# Patient Record
Sex: MALE | Race: BLACK OR AFRICAN AMERICAN | Employment: OTHER | URBAN - METROPOLITAN AREA
[De-identification: names, ages, dates, MRNs, and addresses within clinical notes are randomized per-mention and may not be internally consistent; named-entity substitution may affect disease eponyms.]

---

## 2017-02-07 ENCOUNTER — HOSPITAL ENCOUNTER (OUTPATIENT)
Dept: RADIATION ONCOLOGY | Age: 62
Discharge: HOME | End: 2017-02-07
Payer: OTHER GOVERNMENT

## 2017-02-07 DIAGNOSIS — C61 PROSTATE CANCER (HCC): ICD-10-CM

## 2017-02-07 LAB — PSA SERPL-MCNC: 0.3 NG/ML

## 2017-02-07 PROCEDURE — 84403 ASSAY OF TOTAL TESTOSTERONE: CPT | Performed by: RADIOLOGY

## 2017-02-07 PROCEDURE — 84153 ASSAY OF PSA TOTAL: CPT | Performed by: RADIOLOGY

## 2017-02-07 PROCEDURE — 36415 COLL VENOUS BLD VENIPUNCTURE: CPT | Performed by: RADIOLOGY

## 2017-02-08 LAB
COMMENT, TESC2: ABNORMAL
TESTOST SERPL-MCNC: 247 NG/DL (ref 348–1197)

## 2017-02-14 ENCOUNTER — APPOINTMENT (OUTPATIENT)
Dept: RADIATION ONCOLOGY | Age: 62
End: 2017-02-14
Payer: OTHER GOVERNMENT

## 2017-02-15 ENCOUNTER — HOSPITAL ENCOUNTER (OUTPATIENT)
Dept: RADIATION ONCOLOGY | Age: 62
Discharge: HOME | End: 2017-02-15
Payer: OTHER GOVERNMENT

## 2017-02-15 VITALS
SYSTOLIC BLOOD PRESSURE: 141 MMHG | BODY MASS INDEX: 34.11 KG/M2 | WEIGHT: 251.5 LBS | DIASTOLIC BLOOD PRESSURE: 87 MMHG | TEMPERATURE: 98.1 F | OXYGEN SATURATION: 99 % | HEART RATE: 68 BPM

## 2017-02-15 DIAGNOSIS — C61 PROSTATE CANCER (HCC): Primary | ICD-10-CM

## 2017-02-15 PROCEDURE — 99211 OFF/OP EST MAY X REQ PHY/QHP: CPT

## 2017-02-15 NOTE — NURSE NAVIGATOR
Pt here for 3 month f/u for Prostate cancer. AUA/Epic symptom  completed. AUA score 17. S/p Brachytherapy 8-15-16. S/p Space OAR. Eligard 4-7-16. Finished Casodex in October. Labs 2-7-17:  PSA 0.3. Testosterone 247. Pt c/o pain level 8 to left groin starting Monday night. States he has palpable area he believes is a lymph node. He rode bike first time in a while on Monday for about 2 blocks.     Rosanne Cueva, RN

## 2017-02-15 NOTE — PROGRESS NOTES
Patient: Michelle Wright MRN: [id number]  SSN: [id number]    YOB: 1955  Age: 62 y.o. Sex: male      DIAGNOSIS:  Adenocarcinoma of the prostate, T2a, Roxanna score 4+4 = 8, PSA 4.49    TREATMENT SITE AND DOSE DELIVERED:    1)  He received HDR brachytherapy under the direction of Dr. Williams Lu in [place] on 08/15/16. He also had              SpaceOAR hydrogel placed on 08/17/16. Pelvis has received 37.8 Gy of 45 Gy in 25/25 fractions that he completed      10/10/16. HISTORY OF PRESENT ILLNESS: Michelle Wright is a 62year old male seen by Dr. Edmundo Cleveland on 03/16/16 at the request of Dr. Rogelio Tracy for further discussion of management options for this high risk prostate cancer. He was found to have an elevated PSA of 4.49 by his primary care physician. He was then referred to Dr. Rogelio Tracy for further evaluation. Dr. Rogelio Tracy palpated a 1 cm nodule at the right apex. He has a family history of prostate cancer in his father. Prostate biopsy on 01/04/16 demonstrated a 36.17 g prostate for a PSA density of 0.12. Pathology showed Oakwood score 4+4 = 8 disease in 25% of the tissue from the left lateral base. 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 High-grade PIN was also seen in tissue from the right lateral base. No other biopsy cores contained cancer. MRI of the pelvis on 02/02/16 showed significant heterogeneity throughout the peripheral zone of the prostate. The most convincing focus of T2 signal loss was seen at the left posterior base where a nodule measuring 9 mm was seen. Abnormal signal change was seen in the bilateral seminal vesicles which was felt likely to represent hemorrhage from biopsy. No extracapsular disease was identified. No pelvic lymphadenopathy or other metastatic disease was seen in the pelvis or visualized abdomen. Bone scan on 02/02/16 showed no evidence of bony metastatic disease. He had a thorough discussion with Dr. Rogelio Tracy regarding various treatment options.  He was interested in further discussion of radiation therapy.       INTERVAL HISTORY:  Roney Marcus is a 62 y.o. male now four months following his radiation treatment for his high risk prostate cancer. He has an AUA score of 17/35 but reports that his urinary function is essentially back to baseline. He continues with nccturia x3. He denies frequency, urgency, leakage, dysuria or hematuria. He has good force of stream. He is no longer on Flomax and doesn't notice a difference with his urinary function. He has good bowel function with occasional constipation. Energy has improved and he continues to exercise daily. He has a good appetite. Denies cough or shortness of breath. Feels well. He retired 11/1/16. He tells me that he went on a very short bike ride around the block over the weekend, and that evening he began having left groin pain. He denies heavy lifting or injury to the area. Denies fever. GENERAL: The patient is well-developed, ambulatory, alert and in no acute distress. HEENT: Head is normocephalic, atraumatic. NECK: Neck is supple with no masses. CARDIOVASCULAR: Heart has regular rate and rhythm. RESPIRATORY: Lungs are clear to auscultation. Left groin with scant swelling and very painful to touch. No redness or folliculitis noted. ? Hernia. LABS:   02/07/17: PSA 0.3 with testosterone of 247  11/11/16: PSA <0.1 with testosterone of <3    ASSESSMENT:  Roney Marcus is now four months following completion of IMRT. He received a single dose of Eligard on 4/7/16 and completed the casodex on 10/10/16. He continues with occasional, mild hot flashes. He  He is recovering as anticipated from ADT and radiation therapy. His PSA has taken a little jump to 0.3 from <0.1, most likely in response to his rising testosterone which is now at 247. He is no longer on Flomax and hasn't noticed a change in his urinary function. In regards to his ED, he was given viagra from his urologist with the [place] and has had little to no response.  He has placed a call to his urologist to discuss Cialis. 2) He is having new onset left groin pain since the weekend. The pain is not in the radiation field from his previous radiation for his prostate cancer. We discussed concern of a hernia and have asked him to follow up with his primary care physician today. He may need a referral to a surgeon. PLAN:  1) labs reviewed with Mr. Lester Samuel. 2) refer to primary care in regards to new onset pain of left groin. 2) discussed future management of his high risk prostate cancer. Will plan to see him every 3 months with labs x2 years then every 6 months generally. Will see me in 3 months. If PSA stable or better, will begin alternating every 3 month follow ups with Dr. Edin Hinojosa. Mr. Lester Samuel is in agreement with this plan. Mary Worley NP   February 15, 2017      Portions of this note were copied from prior encounters and reviewed for accuracy, currency, and represent documentation and tasks completed during this encounter. I verify and attest these portions to be unchanged from prior visits. Portions of this note were copied from prior encounters and reviewed for accuracy, currency, and represent documentation and tasks completed during this encounter. I verify and attest these portions to be unchanged from prior visits.

## 2017-05-11 ENCOUNTER — ANESTHESIA EVENT (OUTPATIENT)
Dept: SURGERY | Age: 62
End: 2017-05-11
Payer: OTHER GOVERNMENT

## 2017-05-12 ENCOUNTER — HOSPITAL ENCOUNTER (OUTPATIENT)
Age: 62
Setting detail: OUTPATIENT SURGERY
Discharge: HOME | End: 2017-05-12
Attending: ORTHOPAEDIC SURGERY | Admitting: ORTHOPAEDIC SURGERY
Payer: OTHER GOVERNMENT

## 2017-05-12 ENCOUNTER — ANESTHESIA (OUTPATIENT)
Dept: SURGERY | Age: 62
End: 2017-05-12
Payer: OTHER GOVERNMENT

## 2017-05-12 VITALS
RESPIRATION RATE: 16 BRPM | BODY MASS INDEX: 33.43 KG/M2 | SYSTOLIC BLOOD PRESSURE: 135 MMHG | WEIGHT: 246.5 LBS | OXYGEN SATURATION: 92 % | TEMPERATURE: 97.5 F | DIASTOLIC BLOOD PRESSURE: 84 MMHG | HEART RATE: 53 BPM

## 2017-05-12 PROCEDURE — 74011250636 HC RX REV CODE- 250/636

## 2017-05-12 PROCEDURE — 77030006668 HC BLD SHV MENSCS STRY -B: Performed by: ORTHOPAEDIC SURGERY

## 2017-05-12 PROCEDURE — 74011250636 HC RX REV CODE- 250/636: Performed by: ANESTHESIOLOGY

## 2017-05-12 PROCEDURE — 76060000032 HC ANESTHESIA 0.5 TO 1 HR: Performed by: ORTHOPAEDIC SURGERY

## 2017-05-12 PROCEDURE — 77030018836 HC SOL IRR NACL ICUM -A: Performed by: ORTHOPAEDIC SURGERY

## 2017-05-12 PROCEDURE — 74011250637 HC RX REV CODE- 250/637: Performed by: ANESTHESIOLOGY

## 2017-05-12 PROCEDURE — 36415 COLL VENOUS BLD VENIPUNCTURE: CPT | Performed by: ORTHOPAEDIC SURGERY

## 2017-05-12 PROCEDURE — 74011250636 HC RX REV CODE- 250/636: Performed by: ORTHOPAEDIC SURGERY

## 2017-05-12 PROCEDURE — 77030020143 HC AIRWY LARYN INTUB CGAS -A: Performed by: ANESTHESIOLOGY

## 2017-05-12 PROCEDURE — 74011000250 HC RX REV CODE- 250

## 2017-05-12 PROCEDURE — 76210000063 HC OR PH I REC FIRST 0.5 HR: Performed by: ORTHOPAEDIC SURGERY

## 2017-05-12 PROCEDURE — 77030020753 HC CUF TRNQT 1BLA STRY -B: Performed by: ORTHOPAEDIC SURGERY

## 2017-05-12 PROCEDURE — 76210000020 HC REC RM PH II FIRST 0.5 HR: Performed by: ORTHOPAEDIC SURGERY

## 2017-05-12 PROCEDURE — 76010000160 HC OR TIME 0.5 TO 1 HR INTENSV-TIER 1: Performed by: ORTHOPAEDIC SURGERY

## 2017-05-12 RX ORDER — SODIUM CHLORIDE 0.9 % (FLUSH) 0.9 %
5-10 SYRINGE (ML) INJECTION AS NEEDED
Status: DISCONTINUED | OUTPATIENT
Start: 2017-05-12 | End: 2017-05-12 | Stop reason: HOSPADM

## 2017-05-12 RX ORDER — ACETAMINOPHEN 500 MG
1000 TABLET ORAL ONCE
Status: COMPLETED | OUTPATIENT
Start: 2017-05-12 | End: 2017-05-12

## 2017-05-12 RX ORDER — HYDROMORPHONE HYDROCHLORIDE 2 MG/ML
0.5 INJECTION, SOLUTION INTRAMUSCULAR; INTRAVENOUS; SUBCUTANEOUS
Status: DISCONTINUED | OUTPATIENT
Start: 2017-05-12 | End: 2017-05-12 | Stop reason: HOSPADM

## 2017-05-12 RX ORDER — LIDOCAINE HYDROCHLORIDE 20 MG/ML
INJECTION, SOLUTION EPIDURAL; INFILTRATION; INTRACAUDAL; PERINEURAL AS NEEDED
Status: DISCONTINUED | OUTPATIENT
Start: 2017-05-12 | End: 2017-05-12 | Stop reason: HOSPADM

## 2017-05-12 RX ORDER — FAMOTIDINE 20 MG/1
20 TABLET, FILM COATED ORAL ONCE
Status: COMPLETED | OUTPATIENT
Start: 2017-05-12 | End: 2017-05-12

## 2017-05-12 RX ORDER — SODIUM CHLORIDE, SODIUM LACTATE, POTASSIUM CHLORIDE, CALCIUM CHLORIDE 600; 310; 30; 20 MG/100ML; MG/100ML; MG/100ML; MG/100ML
100 INJECTION, SOLUTION INTRAVENOUS CONTINUOUS
Status: DISCONTINUED | OUTPATIENT
Start: 2017-05-12 | End: 2017-05-12 | Stop reason: HOSPADM

## 2017-05-12 RX ORDER — DEXAMETHASONE SODIUM PHOSPHATE 4 MG/ML
INJECTION, SOLUTION INTRA-ARTICULAR; INTRALESIONAL; INTRAMUSCULAR; INTRAVENOUS; SOFT TISSUE AS NEEDED
Status: DISCONTINUED | OUTPATIENT
Start: 2017-05-12 | End: 2017-05-12 | Stop reason: HOSPADM

## 2017-05-12 RX ORDER — OXYCODONE HYDROCHLORIDE 5 MG/1
10 TABLET ORAL
Status: COMPLETED | OUTPATIENT
Start: 2017-05-12 | End: 2017-05-12

## 2017-05-12 RX ORDER — PROPOFOL 10 MG/ML
INJECTION, EMULSION INTRAVENOUS AS NEEDED
Status: DISCONTINUED | OUTPATIENT
Start: 2017-05-12 | End: 2017-05-12 | Stop reason: HOSPADM

## 2017-05-12 RX ORDER — FENTANYL CITRATE 50 UG/ML
INJECTION, SOLUTION INTRAMUSCULAR; INTRAVENOUS AS NEEDED
Status: DISCONTINUED | OUTPATIENT
Start: 2017-05-12 | End: 2017-05-12 | Stop reason: HOSPADM

## 2017-05-12 RX ORDER — ONDANSETRON 2 MG/ML
INJECTION INTRAMUSCULAR; INTRAVENOUS AS NEEDED
Status: DISCONTINUED | OUTPATIENT
Start: 2017-05-12 | End: 2017-05-12 | Stop reason: HOSPADM

## 2017-05-12 RX ORDER — MIDAZOLAM HYDROCHLORIDE 1 MG/ML
2 INJECTION, SOLUTION INTRAMUSCULAR; INTRAVENOUS
Status: COMPLETED | OUTPATIENT
Start: 2017-05-12 | End: 2017-05-12

## 2017-05-12 RX ORDER — CEFAZOLIN SODIUM IN 0.9 % NACL 2 G/50 ML
2 INTRAVENOUS SOLUTION, PIGGYBACK (ML) INTRAVENOUS ONCE
Status: COMPLETED | OUTPATIENT
Start: 2017-05-12 | End: 2017-05-12

## 2017-05-12 RX ORDER — LIDOCAINE HYDROCHLORIDE 10 MG/ML
0.3 INJECTION INFILTRATION; PERINEURAL ONCE
Status: DISCONTINUED | OUTPATIENT
Start: 2017-05-12 | End: 2017-05-12 | Stop reason: HOSPADM

## 2017-05-12 RX ORDER — SODIUM CHLORIDE 0.9 % (FLUSH) 0.9 %
5-10 SYRINGE (ML) INJECTION EVERY 8 HOURS
Status: DISCONTINUED | OUTPATIENT
Start: 2017-05-12 | End: 2017-05-12 | Stop reason: HOSPADM

## 2017-05-12 RX ADMIN — PROPOFOL 200 MG: 10 INJECTION, EMULSION INTRAVENOUS at 07:29

## 2017-05-12 RX ADMIN — CEFAZOLIN 2 G: 1 INJECTION, POWDER, FOR SOLUTION INTRAMUSCULAR; INTRAVENOUS; PARENTERAL at 07:32

## 2017-05-12 RX ADMIN — SODIUM CHLORIDE, SODIUM LACTATE, POTASSIUM CHLORIDE, AND CALCIUM CHLORIDE 100 ML/HR: 600; 310; 30; 20 INJECTION, SOLUTION INTRAVENOUS at 06:11

## 2017-05-12 RX ADMIN — LIDOCAINE HYDROCHLORIDE 100 MG: 20 INJECTION, SOLUTION EPIDURAL; INFILTRATION; INTRACAUDAL; PERINEURAL at 07:29

## 2017-05-12 RX ADMIN — DEXAMETHASONE SODIUM PHOSPHATE 8 MG: 4 INJECTION, SOLUTION INTRA-ARTICULAR; INTRALESIONAL; INTRAMUSCULAR; INTRAVENOUS; SOFT TISSUE at 07:44

## 2017-05-12 RX ADMIN — MIDAZOLAM HYDROCHLORIDE 2 MG: 1 INJECTION, SOLUTION INTRAMUSCULAR; INTRAVENOUS at 07:16

## 2017-05-12 RX ADMIN — FENTANYL CITRATE 25 MCG: 50 INJECTION, SOLUTION INTRAMUSCULAR; INTRAVENOUS at 07:54

## 2017-05-12 RX ADMIN — PROPOFOL 50 MG: 10 INJECTION, EMULSION INTRAVENOUS at 07:34

## 2017-05-12 RX ADMIN — OXYCODONE HYDROCHLORIDE 10 MG: 5 TABLET ORAL at 08:49

## 2017-05-12 RX ADMIN — ACETAMINOPHEN 1000 MG: 500 TABLET ORAL at 06:09

## 2017-05-12 RX ADMIN — FENTANYL CITRATE 25 MCG: 50 INJECTION, SOLUTION INTRAMUSCULAR; INTRAVENOUS at 07:44

## 2017-05-12 RX ADMIN — FAMOTIDINE 20 MG: 20 TABLET ORAL at 06:09

## 2017-05-12 RX ADMIN — FENTANYL CITRATE 50 MCG: 50 INJECTION, SOLUTION INTRAMUSCULAR; INTRAVENOUS at 07:33

## 2017-05-12 RX ADMIN — ONDANSETRON 4 MG: 2 INJECTION INTRAMUSCULAR; INTRAVENOUS at 07:58

## 2017-05-12 NOTE — ANESTHESIA POSTPROCEDURE EVALUATION
Post-Anesthesia Evaluation and Assessment    Patient: Melvi Anglin MRN: [id number]  SSN: [id number]    YOB: 1955  Age: 62 y.o. Sex: male       Cardiovascular Function/Vital Signs  Visit Vitals    /84    Pulse (!) 53    Temp 36.4 °C (97.5 °F)    Resp 16    Wt 111.8 kg (246 lb 8 oz)    SpO2 92%    BMI 33.43 kg/m2       Patient is status post total IV anesthesia anesthesia for Procedure(s):  KNEE ARTHROSCOPY BILATERAL Right Knee Medial Menisectomy / partial medial menisectomy /  abrasion chondroplasty . Nausea/Vomiting: None    Postoperative hydration reviewed and adequate. Pain:  Pain Scale 1: Numeric (0 - 10) (05/12/17 0849)  Pain Intensity 1: 6 (05/12/17 0849)   Managed    Neurological Status:   Neuro (WDL): Within Defined Limits (05/12/17 0627)   At baseline    Mental Status and Level of Consciousness: Alert and oriented     Pulmonary Status:   O2 Device:  (o2 off) (05/12/17 0844)   Adequate oxygenation and airway patent    Complications related to anesthesia: None    Post-anesthesia assessment completed.  No concerns    Signed By: Eliseo Linda MD     May 12, 2017

## 2017-05-12 NOTE — IP AVS SNAPSHOT
Current Discharge Medication List  
  
CONTINUE these medications which have NOT CHANGED Dose & Instructions Dispensing Information Comments Morning Noon Evening Bedtime  
 amLODIPine 10 mg tablet Commonly known as:  Ivonne Hooper Your last dose was: Your next dose is:    
   
   
 Dose:  10 mg Take 10 mg by mouth every morning. Refills:  0  
     
   
   
   
  
 simvastatin 20 mg tablet Commonly known as:  ZOCOR Your last dose was: Your next dose is:    
   
   
 Dose:  20 mg Take 20 mg by mouth every morning. Refills:  0  
     
   
   
   
  
 VITAMIN D3 1,000 unit Cap Generic drug:  cholecalciferol Your last dose was: Your next dose is:    
   
   
 Dose:  1000 Units Take 1,000 Units by mouth daily. Refills:  0

## 2017-05-12 NOTE — DISCHARGE INSTRUCTIONS
Post-Operative Instructions   For  Knee Arthroscopy  Phone:  [phone]    1. Unless otherwise instructed, you may place as much weight on your leg as you wish. 2. If you do not have an \"Iceman\" type cooling unit, for the first 48-72 hours following surgery, use ice on the knee every two hours (while awake) for 20-30 minutes at a time to help prevent swelling and lessen pain. If you have a cooling unit, follow the instructions given to you- continually as much as possible the first 48-72 hours, then 3-4 times a day for 4 weeks. Elevate leg. 3. Perform at least 30 to 50 leg-raising exercises twice a day. Begin exercise as soon as pain allows. Also perform gentle active motion of the knee as soon as pain allows. 4. On the third day after surgery, remove the dressing. Leave steri-strips on, they eventually will peel off.      5. Use any pain medication as instructed. You should take your pain medication as soon as you feel the anesthetic wearing off. Do not wait until you are in severe pain to begin taking your pain medication. 6. You may have some side effects from your pain medication. If you have nausea, try taking your medication with food. For itching, you may take over the counter Benadryl. 7. You may shower as soon as desired. The first three days after surgery, wrap the dressings in saran wrap to keep them dry when showering. 8. You may have been given a prescription for Zofran or Phenergan. This medication is used for nausea and vomiting. You do not need to get this prescription filled unless you have a problem. 9. If you have a problem, please call [place] [address] at [phone]    [facility] DIET  · Clear liquids until no nausea or vomiting; then light diet for the first day. · Advance to regular diet on second day, unless your doctor orders otherwise.    · If nausea and vomiting continues, call your doctor. PAIN  · Take pain medication as directed by your doctor. · Call your doctor if pain is NOT relieved by medication. CALL YOUR DOCTOR IF   · Excessive bleeding that does not stop after holding pressure over the area  · Temperature of 101 degrees F or above  · Excessive redness, swelling or bruising, and/ or green or yellow, smelly discharge from incision    AFTER ANESTHESIA   · For the first 24 hours: DO NOT Drive, Drink alcoholic beverages, or Make important decisions. · Be aware of dizziness following anesthesia and while taking pain medication. APPOINTMENT DATE/ TIME his office will call you with a 2 week follow up   [address] PHONE NUMBER  [phone]      DISCHARGE SUMMARY from Nurse    PATIENT INSTRUCTIONS:    After general anesthesia or intravenous sedation, for 24 hours or while taking prescription Narcotics:  · Limit your activities  · Do not drive and operate hazardous machinery  · Do not make important personal or business decisions  · Do  not drink alcoholic beverages  · If you have not urinated within 8 hours after discharge, please contact your surgeon on call. *  Please give a list of your current medications to your Primary Care Provider. *  Please update this list whenever your medications are discontinued, doses are      changed, or new medications (including over-the-counter products) are added. *  Please carry medication information at all times in case of emergency situations. These are general instructions for a healthy lifestyle:    No smoking/ No tobacco products/ Avoid exposure to second hand smoke    Surgeon General's Warning:  Quitting smoking now greatly reduces serious risk to your health.     Obesity, smoking, and sedentary lifestyle greatly increases your risk for illness    A healthy diet, regular physical exercise & weight monitoring are important for maintaining a healthy lifestyle    You may be retaining fluid if you have a history of heart failure or if you experience any of the following symptoms:  Weight gain of 3 pounds or more overnight or 5 pounds in a week, increased swelling in our hands or feet or shortness of breath while lying flat in bed. Please call your doctor as soon as you notice any of these symptoms; do not wait until your next office visit. Recognize signs and symptoms of STROKE:    F-face looks uneven    A-arms unable to move or move unevenly    S-speech slurred or non-existent    T-time-call 911 as soon as signs and symptoms begin-DO NOT go       Back to bed or wait to see if you get better-TIME IS BRAIN.

## 2017-05-12 NOTE — IP AVS SNAPSHOT
[address] 
[phone] Patient: Johnathan Miranda MRN: [id number] UBC:[id number] You are allergic to the following No active allergies Recent Documentation Weight BMI Smoking Status 111.8 kg 33.43 kg/m2 Former Smoker Emergency Contacts Name Discharge Info Relation Home Work Mobile [address] [facility] CAREGIVER [3] Spouse [3] [phone] About your hospitalization You were admitted on:  May 12, 2017 You last received care in the:  [facility] OP PACU You were discharged on:  May 12, 2017 Unit phone number:  [phone] Why you were hospitalized Your primary diagnosis was:  Not on File Providers Seen During Your Hospitalizations Provider Role Specialty Primary office phone Dennis Varner MD Attending Provider Orthopedic Surgery [phone] Your Primary Care Physician (PCP) Primary Care Physician Office Phone Office Fax [address] [phone] Follow-up Information Follow up With Details Comments Contact Info Elva Saldivar MD   [address] [place] [address] 
[phone] Your Appointments Tuesday May 16, 2017  8:00 AM EDT Radiation Oncology Lab with [address] ([address]) [address] [facility] [address] [facility] [address]  
[phone] Tuesday May 23, 2017  8:30 AM EDT  
[address] [facility] OFFICE VISIT with Mike Mendoza NP  
[address] [address] [facility] [address] [facility] [address]  
[phone] Current Discharge Medication List  
  
CONTINUE these medications which have NOT CHANGED Dose & Instructions Dispensing Information Comments Morning Noon Evening Bedtime  
 amLODIPine 10 mg tablet Commonly known as:  Andres Houston Your last dose was: Your next dose is:    
   
   
 Dose:  10 mg Take 10 mg by mouth every morning. Refills:  0  
     
   
   
   
  
 simvastatin 20 mg tablet Commonly known as:  ZOCOR Your last dose was: Your next dose is:    
   
   
 Dose:  20 mg Take 20 mg by mouth every morning. Refills:  0  
     
   
   
   
  
 VITAMIN D3 1,000 unit Cap Generic drug:  cholecalciferol Your last dose was: Your next dose is:    
   
   
 Dose:  1000 Units Take 1,000 Units by mouth daily. Refills:  0 Discharge Instructions Post-Operative Instructions For 
Knee Arthroscopy Phone:  [phone] 1. Unless otherwise instructed, you may place as much weight on your leg as you wish. 2. If you do not have an \"Iceman\" type cooling unit, for the first 48-72 hours following surgery, use ice on the knee every two hours (while awake) for 20-30 minutes at a time to help prevent swelling and lessen pain. If you have a cooling unit, follow the instructions given to you- continually as much as possible the first 48-72 hours, then 3-4 times a day for 4 weeks. Elevate leg. 3. Perform at least 30 to 50 leg-raising exercises twice a day. Begin exercise as soon as pain allows. Also perform gentle active motion of the knee as soon as pain allows. 4. On the third day after surgery, remove the dressing. Leave steri-strips on, they eventually will peel off.   
 
5. Use any pain medication as instructed. You should take your pain medication as soon as you feel the anesthetic wearing off. Do not wait until you are in severe pain to begin taking your pain medication. 6. You may have some side effects from your pain medication. If you have nausea, try taking your medication with food. For itching, you may take over the counter Benadryl. 7. You may shower as soon as desired. The first three days after surgery, wrap the dressings in saran wrap to keep them dry when showering. 8. You may have been given a prescription for Zofran or Phenergan. This medication is used for nausea and vomiting. You do not need to get this prescription filled unless you have a problem. 9. If you have a problem, please call [address] [place] [address] at [phone] [facility] DIET · Clear liquids until no nausea or vomiting; then light diet for the first day. · Advance to regular diet on second day, unless your doctor orders otherwise. · If nausea and vomiting continues, call your doctor. PAIN 
· Take pain medication as directed by your doctor. · Call your doctor if pain is NOT relieved by medication. CALL YOUR DOCTOR IF  
· Excessive bleeding that does not stop after holding pressure over the area · Temperature of 101 degrees F or above · Excessive redness, swelling or bruising, and/ or green or yellow, smelly discharge from incision AFTER ANESTHESIA · For the first 24 hours: DO NOT Drive, Drink alcoholic beverages, or Make important decisions. · Be aware of dizziness following anesthesia and while taking pain medication. APPOINTMENT DATE/ TIME his office will call you with a 2 week follow up YOUR DOCTOR'S PHONE NUMBER  [phone] DISCHARGE SUMMARY from Nurse PATIENT INSTRUCTIONS: 
 
After general anesthesia or intravenous sedation, for 24 hours or while taking prescription Narcotics: · Limit your activities · Do not drive and operate hazardous machinery · Do not make important personal or business decisions · Do  not drink alcoholic beverages · If you have not urinated within 8 hours after discharge, please contact your surgeon on call. *  Please give a list of your current medications to your Primary Care Provider. *  Please update this list whenever your medications are discontinued, doses are 
    changed, or new medications (including over-the-counter products) are added. *  Please carry medication information at all times in case of emergency situations. These are general instructions for a healthy lifestyle: No smoking/ No tobacco products/ Avoid exposure to second hand smoke Surgeon General's Warning:  Quitting smoking now greatly reduces serious risk to your health. Obesity, smoking, and sedentary lifestyle greatly increases your risk for illness A healthy diet, regular physical exercise & weight monitoring are important for maintaining a healthy lifestyle You may be retaining fluid if you have a history of heart failure or if you experience any of the following symptoms:  Weight gain of 3 pounds or more overnight or 5 pounds in a week, increased swelling in our hands or feet or shortness of breath while lying flat in bed. Please call your doctor as soon as you notice any of these symptoms; do not wait until your next office visit. Recognize signs and symptoms of STROKE: 
 
F-face looks uneven A-arms unable to move or move unevenly S-speech slurred or non-existent T-time-call 911 as soon as signs and symptoms begin-DO NOT go Back to bed or wait to see if you get better-TIME IS BRAIN. Discharge Orders None Introducing [facility]! Niurka Birch introduces [employer] patient portal. Now you can access parts of your medical record, email your doctor's office, and request medication refills online. 1. In your internet browser, go to [email or web address] 2. Click on the First Time User? Click Here link in the Sign In box. You will see the New Member Sign Up page. 3. Enter your [employer] Access Code exactly as it appears below. You will not need to use this code after youve completed the sign-up process.  If you do not sign up before the expiration date, you must request a new code. · [employer] Access Code: [email or web address] Expires: 8/10/2017  8:03 AM 
 
4. Enter the last four digits of your Social Security Number (xxxx) and Date of Birth (mm/dd/yyyy) as indicated and click Submit. You will be taken to the next sign-up page. 5. Create a [employer] ID. This will be your [employer] login ID and cannot be changed, so think of one that is secure and easy to remember. 6. Create a [employer] password. You can change your password at any time. 7. Enter your Password Reset Question and Answer. This can be used at a later time if you forget your password. 8. Enter your e-mail address. You will receive e-mail notification when new information is available in [address] 9. Click Sign Up. You can now view and download portions of your medical record. 10. Click the Download Summary menu link to download a portable copy of your medical information. If you have questions, please visit the Frequently Asked Questions section of the [employer] website. Remember, [employer] is NOT to be used for urgent needs. For medical emergencies, dial 911. Now available from your iPhone and Android! General Information Please provide this summary of care documentation to your next provider. Patient Signature:  ____________________________________________________________ Date:  ____________________________________________________________  
  
Alessandra Gerber Provider Signature:  ____________________________________________________________ Date:  ____________________________________________________________

## 2017-05-12 NOTE — ANESTHESIA PREPROCEDURE EVALUATION
Anesthetic History               Review of Systems / Medical History  Patient summary reviewed, nursing notes reviewed and pertinent labs reviewed    Pulmonary                Comments: Heavy snoring   Neuro/Psych              Cardiovascular    Hypertension: well controlled              Exercise tolerance: >4 METS     GI/Hepatic/Renal                Endo/Other      Hypothyroidism: well controlled       Other Findings            Physical Exam    Airway  Mallampati: II  TM Distance: 4 - 6 cm  Neck ROM: normal range of motion   Mouth opening: Normal     Cardiovascular  Regular rate and rhythm,  S1 and S2 normal,  no murmur, click, rub, or gallop             Dental  No notable dental hx  Dentition: Full lower dentures and Full upper dentures     Pulmonary  Breath sounds clear to auscultation               Abdominal         Other Findings            Anesthetic Plan    ASA: 2  Anesthesia type: general          Induction: Intravenous  Anesthetic plan and risks discussed with: Patient and Spouse

## 2017-05-12 NOTE — OP NOTES
[address] 66   OPERATIVE REPORT       Name:  Jory Felton   MR#:  [id number]   :  1955   Account #:  [de-identified]   Date of Adm:  2017       DATE OF SURGERY: 2017     PREOPERATIVE DIAGNOSES   1. Medial meniscal tear and chondromalacia medial tibial   plateau, RIGHT knee. 2. Chondromalacia patella - patellofemoral compartment, LEFT   knee. 3. Medial meniscal tear and chondromalacia medial tibial plateau   medial compartment, LEFT knee. POSTOPERATIVE DIAGNOSES   1. Medial meniscal tear and chondromalacia medial tibial   plateau, RIGHT knee. 2. Chondromalacia patella - patellofemoral compartment, LEFT   knee. 3. Medial meniscal tear and chondromalacia medial tibial plateau   medial compartment, LEFT knee. OPERATION PERFORMED   1. RIGHT knee arthroscopy with partial medial meniscectomy and   abrasion arthroplasty, medial tibial plateau. 2. LEFT knee arthroscopy with abrasion arthroplasty patella -   patellofemoral compartment. 3. LEFT knee arthroscopy with partial medial meniscectomy and   abrasion arthroplasty, medial tibial plateau - medial   compartment. SURGEON: Jose De Jesus Gillespie MD.    ASSISTANT: JONATHAN Whitehead.    ANESTHESIA: General.    FLUIDS: Crystalloid. ESTIMATED BLOOD LOSS: Minimal.    FINDINGS: Both knees in the medial compartment showed grade 2,   3, 4 chondromalacia of the medial tibial plateaus 1 x 1.5 cm. There was tearing of the posterior horn and anterior horns of   the meniscus in both knees. In addition, the left knee showed   grade 2, 3, 4 chondromalacia of the patella, 1.5 x 2 cm. The   remainder of the arthroscopic exam of both knees showed no   abnormalities. DESCRIPTION OF PROCEDURE: After informed consent, the patient   was brought to the operating room and placed on the table in   supine position. General endotracheal anesthesia was   administered without difficulty.  Tourniquet was applied to right   and left upper thighs. Right and left knees and legs were   prepped and draped in sterile fashion. Attention was directed to the right knee. Tourniquet was   inflated. Standard inferomedial and inferolateral portals were   made for scope and instruments. Knee was then arthroscoped in   sequential manner. The aforementioned findings were noted. A   partial medial meniscectomy was performed. All the torn portion   was removed. At the termination of partial medial meniscectomy,   the remaining meniscal rim had a smooth contour with no sharp   edges or unstable fragments. A chondroplasty of the medial   tibial plateau was performed back to a smooth, stable rim. There   were no sharp edges or unstable fragments after the   chondroplasty. There was an area of exposed bone and abrasion   arthroplasty was performed down to bleeding subchondral bone   without difficulty. Knee was thoroughly irrigated, portals   closed. Sterile dressings were applied. Tourniquet was deflated. Attention was directed to the left knee. Tourniquet was   inflated. Standard inferomedial and inferolateral portals were   made for scope and instruments. Knee was then arthroscoped in   sequential manner. The aforementioned findings were noted. Attention was directed to the patellofemoral compartment. Using   the shaver through both medial and lateral portals,   chondroplasty of the patella was performed. All loose cartilage   flaps were removed. At the termination of chondroplasty, the   remaining cartilage was well adherent to underlying bone. No   sharp edges or unstable fragments. There was an area of exposed   bone and abrasion arthroplasty was performed down to bleeding   subchondral bone without difficulty. In addition, there was a   trochlear lesion that underwent a chondroplasty.  There was a   central area of exposed bone and abrasion chondroplasty was   performed with the pick to produce a microfracture every 3 mm in   the area of grade 4 chondromalacia. The abrasion chondroplasty   was performed without difficulty. Attention was directed to the medial compartment of the knee. Using a hand instrument shaver, a partial medial meniscectomy   was performed back to a smooth, stable rim. All loose flaps of   tissue were removed. There were no sharp edges or unstable   fragments after the partial medial meniscectomy. In addition, a   chondroplasty/abrasion arthroplasty of the medial tibial plateau   was performed back to a smooth, stable rim as on the right knee. The knee was thoroughly irrigated, portals closed. Sterile   dressings were applied. The patient was extubated and taken to   the recovery room in stable condition. JONATHAN Tellez, assisted during the procedure. He was necessary   for knee injections, patient positioning during the procedure,   especially given the fact that it was bilateral and assistance   with the major portions of the operations. His presence   decreased the operative time and potential complication rate.         MD RYAN Bello / Daniella Alvarado   D:  05/12/2017   08:56   T:  05/12/2017   09:12   Job #:  [id number]

## 2017-05-12 NOTE — H&P
Outpatient Surgery History and Physical:  Naomi Nieto was seen and examined. CHIEF COMPLAINT:   BL knee pain. PE:     Visit Vitals    BP (!) 156/92 (BP 1 Location: Right arm, BP Patient Position: Supine)    Pulse (!) 56    Temp 97.8 °F (36.6 °C)    Resp 18    Wt 111.8 kg (246 lb 8 oz)    SpO2 96%    BMI 33.43 kg/m2       Heart:   Regular rhythm      Lungs:  Are clear      Past Medical History:    Patient Active Problem List    Diagnosis    Prostate cancer ([address]    Elevated PSA    Prostate nodule       Surgical History:   Past Surgical History:   Procedure Laterality Date    HX BUNIONECTOMY Bilateral 2013    HX COLONOSCOPY      HX KNEE ARTHROSCOPY Bilateral 2005    bilateral knees    HX ORTHOPAEDIC Left     left achilles tendon repair       Social History: Patient  reports that he quit smoking about 22 years ago. He has a 10.00 pack-year smoking history. He has never used smokeless tobacco. He reports that he does not drink alcohol or use illicit drugs. Family History:   Family History   Problem Relation Age of Onset   [facility] Cancer Mother      intestinal    Cancer Father      prostate       Allergies: Reviewed per EMR  No Known Allergies    Medications:    No current facility-administered medications on file prior to encounter. Current Outpatient Prescriptions on File Prior to Encounter   Medication Sig    simvastatin (ZOCOR) 20 mg tablet Take 20 mg by mouth every morning.  amLODIPine (NORVASC) 10 mg tablet Take 10 mg by mouth every morning. The surgery is planned for the bilateral knees. History and physical has been reviewed. The patient has been examined. There have been no significant clinical changes since the completion of the originally dated History and Physical.  Patient identified by surgeon; surgical site was confirmed by patient and surgeon. The patient is here today for outpatient surgery.  I have examined the patient, no changes are noted in the patient's medical status. Necessity for the procedure/care is still present and the history and physical above is current. See the office notes for the full long term history of the problem. Please see the recent office notes for the musculoskeletal examination.     Signed By: JONATHAN Patel     May 12, 2017 6:51 AM

## 2017-05-13 LAB
HBV SURFACE AG SERPL QL IA: NEGATIVE
HCV AB S/CO SERPL IA: 0.1 S/CO RATIO (ref 0–0.9)
HCV AB SERPL QL IA: NORMAL
HIV1 P24 AG SERPL QL IA: NONREACTIVE
HIV1+2 AB SERPL QL IA: NONREACTIVE

## 2017-05-16 ENCOUNTER — HOSPITAL ENCOUNTER (OUTPATIENT)
Dept: RADIATION ONCOLOGY | Age: 62
Discharge: HOME | End: 2017-05-16
Payer: OTHER GOVERNMENT

## 2017-05-16 ENCOUNTER — HOSPITAL ENCOUNTER (OUTPATIENT)
Dept: LAB | Age: 62
Discharge: HOME | End: 2017-05-16
Payer: OTHER GOVERNMENT

## 2017-05-16 DIAGNOSIS — C61 PROSTATE CANCER (HCC): ICD-10-CM

## 2017-05-16 LAB — PSA SERPL-MCNC: 0.4 NG/ML

## 2017-05-16 PROCEDURE — 84153 ASSAY OF PSA TOTAL: CPT | Performed by: NURSE PRACTITIONER

## 2017-05-16 PROCEDURE — 36415 COLL VENOUS BLD VENIPUNCTURE: CPT | Performed by: NURSE PRACTITIONER

## 2017-05-16 PROCEDURE — 84403 ASSAY OF TOTAL TESTOSTERONE: CPT | Performed by: NURSE PRACTITIONER

## 2017-05-17 LAB
COMMENT, TESC2: ABNORMAL
TESTOST SERPL-MCNC: 161 NG/DL (ref 348–1197)

## 2017-05-17 NOTE — BRIEF OP NOTE
BRIEF OPERATIVE NOTE    Date of Procedure: 5/12/2017   Preoperative Diagnosis: Primary osteoarthritis of right knee [M17.11]  Primary osteoarthritis of both knees [M17.0]  Postoperative Diagnosis:   Primary osteoarthritis of both knees / Meniscis tear of Right Knee / chomdromalasia, partial medial meniscus tear    Procedure(s):  KNEE ARTHROSCOPY BILATERAL Right Knee Medial Menisectomy / partial medial menisectomy /  abrasion chondroplasty   Surgeon(s) and Role:      Heather Aggarwal MD - Primary         Assistant Staff:  Physician Assistant: JONATHAN Soto    Surgical Staff:  Circ-1: Jayna Gloria RN  Physician Assistant: JONATHAN Soto  Scrub Tech-1: Brenna Ramirez  Scrub Tech-Relief: Jacintoberena Sibley [facility]  Event Time In   Incision Start 6492   Incision Close 1158     Anesthesia: General   Estimated Blood Loss: min  Specimens: * No specimens in log *   Findings: oa   Complications: none  Implants: * No implants in log *

## 2017-05-23 ENCOUNTER — HOSPITAL ENCOUNTER (OUTPATIENT)
Dept: RADIATION ONCOLOGY | Age: 62
Discharge: HOME | End: 2017-05-23
Payer: OTHER GOVERNMENT

## 2017-05-23 VITALS
SYSTOLIC BLOOD PRESSURE: 143 MMHG | HEART RATE: 69 BPM | DIASTOLIC BLOOD PRESSURE: 88 MMHG | OXYGEN SATURATION: 98 % | TEMPERATURE: 98.2 F

## 2017-05-23 DIAGNOSIS — C61 PROSTATE CANCER (HCC): Primary | ICD-10-CM

## 2017-05-23 PROCEDURE — 99211 OFF/OP EST MAY X REQ PHY/QHP: CPT

## 2017-05-23 NOTE — PROGRESS NOTES
Pt here today for FUP post RT to the prostate with an AUA score of 6/35, he is c/o mild urinary straining and nocturia. He is not on Flomax. Pt stated that he is very happy that the hot flashes have stopped. His 5/16/17 PSA is 0.4, testosterone 161.

## 2017-05-23 NOTE — PROGRESS NOTES
Patient: Sierra Mckeon MRN: [id number]  SSN: [id number]    YOB: 1955  Age: 62 y.o. Sex: male      DIAGNOSIS:  Adenocarcinoma of the prostate, T2a, [place] score 4+4 = 8, PSA 4.49    TREATMENT SITE AND DOSE DELIVERED:    1)  He received HDR brachytherapy under the direction of Dr. Axel Farrell in [place] on 08/15/16. He also had              SpaceOAR hydrogel placed on 08/17/16. Pelvis has received 37.8 Gy of 45 Gy in 25/25 fractions that he completed      10/10/16. HISTORY OF PRESENT ILLNESS: Sierra Mckeon is a 62year old male seen by Dr. Erleen Simmonds on 03/16/16 at the request of Dr. Radhika Weston for further discussion of management options for this high risk prostate cancer. He was found to have an elevated PSA of 4.49 by his primary care physician. He was then referred to Dr. Radhika Weston for further evaluation. Dr. Radhika Weston palpated a 1 cm nodule at the right apex. He has a family history of prostate cancer in his father. Prostate biopsy on 01/04/16 demonstrated a 36.17 g prostate for a PSA density of 0.12. Pathology showed [place] score 4+4 = 8 disease in 25% of the tissue from the left lateral base. 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 High-grade PIN was also seen in tissue from the right lateral base. No other biopsy cores contained cancer. MRI of the pelvis on 02/02/16 showed significant heterogeneity throughout the peripheral zone of the prostate. The most convincing focus of T2 signal loss was seen at the left posterior base where a nodule measuring 9 mm was seen. Abnormal signal change was seen in the bilateral seminal vesicles which was felt likely to represent hemorrhage from biopsy. No extracapsular disease was identified. No pelvic lymphadenopathy or other metastatic disease was seen in the pelvis or visualized abdomen. Bone scan on 02/02/16 showed no evidence of bony metastatic disease. He had a thorough discussion with Dr. Radhika Weston regarding various treatment options.  He was interested in further discussion of radiation therapy.       INTERVAL HISTORY:  Jacoby Chavez is a 62 y.o. male now 7 months following his radiation treatment for his high risk prostate cancer. He received a single dose of Eligard on 4/7/16 and completed 6 months of casodex on 10/10/16. He has an AUA score of 6/35. He report his urinary function is essentially back to baseline. He continues with nocturia x2. He denies frequency, urgency, leakage, dysuria or hematuria. He has good force of stream. He is no longer on Flomax and doesn't notice a difference with his urinary function. He has good bowel function with occasional constipation. Energy has improved and he continues to exercise daily. He has a good appetite. Denies cough or shortness of breath. Feels well. He retired 11/1/16. He reports he is beginning to [facility] some feeling\" in regards to his ED. He has tried Viagra and Cialis with no results. GENERAL: The patient is well-developed, ambulatory, alert and in no acute distress. HEENT: Head is normocephalic, atraumatic. NECK: Neck is supple with no masses. CARDIOVASCULAR: Heart has regular rate and rhythm. RESPIRATORY: Lungs are clear to auscultation. LABS:   05/16/17: PSA 0.4 with testosterone of 161  02/07/17: PSA 0.3 with testosterone of 247  11/11/16: PSA <0.1 with testosterone of <3    ASSESSMENT:  Jacoby Chavez is 7 months following completion of IMRT. He received a single dose of Eligard on 4/7/16 and completed 6 months of casodex on 10/10/16. He is recovering as anticipated from ADT and radiation therapy. He had an expected rise in PSA up to 0.4 with rising testosterone. Will need to continue close follow up. PLAN:  1) labs reviewed with Mr. Melissa Roblero. 2) We discussed ongoing surveillance for his prostate cancer, which would include follow up every 3 months with     PSA/Testosterone x2 years then every 6 months generally. All questions were answered to the best of my ability.       Case Palomo NP   May 23, 2017      Portions of this note were copied from prior encounters and reviewed for accuracy, currency, and represent documentation and tasks completed during this encounter. I verify and attest these portions to be unchanged from prior visits.

## 2017-08-15 ENCOUNTER — HOSPITAL ENCOUNTER (OUTPATIENT)
Dept: RADIATION ONCOLOGY | Age: 62
Discharge: HOME | End: 2017-08-15
Payer: OTHER GOVERNMENT

## 2017-08-15 DIAGNOSIS — C61 PROSTATE CANCER (HCC): ICD-10-CM

## 2017-08-15 LAB — PSA SERPL-MCNC: 0.3 NG/ML

## 2017-08-15 PROCEDURE — 84403 ASSAY OF TOTAL TESTOSTERONE: CPT | Performed by: RADIOLOGY

## 2017-08-15 PROCEDURE — 36415 COLL VENOUS BLD VENIPUNCTURE: CPT | Performed by: RADIOLOGY

## 2017-08-15 PROCEDURE — 84153 ASSAY OF PSA TOTAL: CPT | Performed by: RADIOLOGY

## 2017-08-16 LAB — TESTOST SERPL-MCNC: 269 NG/DL (ref 264–916)

## 2017-08-22 ENCOUNTER — HOSPITAL ENCOUNTER (OUTPATIENT)
Dept: RADIATION ONCOLOGY | Age: 62
Discharge: HOME | End: 2017-08-22
Payer: OTHER GOVERNMENT

## 2017-08-22 VITALS
OXYGEN SATURATION: 98 % | WEIGHT: 251.3 LBS | DIASTOLIC BLOOD PRESSURE: 84 MMHG | HEART RATE: 59 BPM | BODY MASS INDEX: 34.08 KG/M2 | SYSTOLIC BLOOD PRESSURE: 145 MMHG | TEMPERATURE: 97.7 F

## 2017-08-22 DIAGNOSIS — C61 PROSTATE CANCER (HCC): Primary | ICD-10-CM

## 2017-08-22 PROCEDURE — 99211 OFF/OP EST MAY X REQ PHY/QHP: CPT

## 2017-08-22 NOTE — PROGRESS NOTES
Pt here for f/u for Prostate cancer. Aua/Epic symptom  completed. Aua score 2. Rt end 10-10-16. Labs 8-15-17. No f/u with Dr. Debbie Haley.     Dora Menjivar RN

## 2017-08-22 NOTE — PROGRESS NOTES
Patient: Carissa Means MRN: [id number]  SSN: [id number]    YOB: 1955  Age: 65 y.o. Sex: male      DIAGNOSIS:  Adenocarcinoma of the prostate, T2a, Roxanna score 4+4 = 8, PSA 4.49    TREATMENT SITE AND DOSE DELIVERED:    1)  He received HDR brachytherapy under the direction of Dr. Ericka Walker in [place] on 08/15/16. He also had SpaceOAR hydrogel placed on 08/17/16. Pelvis has received 37.8 Gy of 45 Gy in 25/25 fractions that he completed 10/10/16. HISTORY OF PRESENT ILLNESS: Carissa Means is a 62year old male initially seen by Dr. Scott Freedman on 03/16/16 at the request of Dr. Stephen Hudson for further discussion of management options for this high risk prostate cancer. He was found to have an elevated PSA of 4.49 by his primary care physician. He was then referred to Dr. Stephen Hudson for further evaluation. Dr. Stephen Hudson palpated a 1 cm nodule at the right apex. He has a family history of prostate cancer in his father. Prostate biopsy on 01/04/16 demonstrated a 36.17 g prostate for a PSA density of 0.12. Pathology showed Black score 4+4 = 8 disease in 25% of the tissue from the left lateral base. 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 High-grade PIN was also seen in tissue from the right lateral base. No other biopsy cores contained cancer. MRI of the pelvis on 02/02/16 showed significant heterogeneity throughout the peripheral zone of the prostate. The most convincing focus of T2 signal loss was seen at the left posterior base where a nodule measuring 9 mm was seen. Abnormal signal change was seen in the bilateral seminal vesicles which was felt likely to represent hemorrhage from biopsy. No extracapsular disease was identified. No pelvic lymphadenopathy or other metastatic disease was seen in the pelvis or visualized abdomen. Bone scan on 02/02/16 showed no evidence of bony metastatic disease. He had a thorough discussion with Dr. Stephen Hudson regarding various treatment options.  He was interested in further discussion of radiation therapy.     INTERVAL HISTORY:  Carissa Means is a 65 y.o. male now 10 months following his radiation treatment for his high risk prostate cancer. He received a single dose of Eligard on 4/7/16 and completed 6 months of casodex on 10/10/16. He has an AUA score of 2/35. He report his urinary function is back to his baseline. He continues with nocturia x1. Otherwise denies frequency, urgency, leakage, dysuria or hematuria. He has good force of stream. He is no longer on Flomax and doesn't notice a difference with his urinary function. He has good bowel function with occasional constipation. Energy is great. Feels well. He retired 11/1/16 and has opened up a [employer] with his son. He continues with ED. He has tried Viagra and Cialis with no results. GENERAL: The patient is well-developed, ambulatory, alert and in no acute distress. HEENT: Head is normocephalic, atraumatic. NECK: Neck is supple with no masses. CARDIOVASCULAR: Heart has regular rate and rhythm. RESPIRATORY: Lungs are clear to auscultation. LABS:   08/15/17: PSA 0.3 with testosterone of 269  05/16/17: PSA 0.4 with testosterone of 161  02/07/17: PSA 0.3 with testosterone of 247  11/11/16: PSA <0.1 with testosterone of <3    ASSESSMENT:  Carissa Means with Adenocarcinoma of the prostate, T2a, Roxanna score 4+4 = 8, PSA 4.49   He received HDR brachytherapy under the direction of Dr. Ericka Walker in [place] on 08/15/16 then radiation, completed 10/10/16. He is now 10 months after completing radiation therapy. He is recovering as anticipated from radiation and ADT therapy. He had a small bump in his PSA with rise of testosterone in February 2017, otherwise his PSA remains stable with continued rising testosterone. He is concerned regarding his ED. He has tried Cialis and Viagra with no results. We discussed his testosterone level in correlation with his ED. His hot flashes are much improved.      PLAN:  1) Begin alternating visits/labs with urology - Dr. Zaheer Alarcon, I will see him again in 6 months with PSA/Testosterone prior to visit. 2) We discussed ongoing surveillance for his prostate cancer, which would include follow up every 3 months with  PSA/Testosterone x2 years then every 6 months generally. All questions were answered to the best of my ability. Jocelyne Robles NP   August 22, 2017      Portions of this note were copied from prior encounters and reviewed for accuracy, currency, and represent documentation and tasks completed during this encounter. I verify and attest these portions to be unchanged from prior visits.

## 2017-12-19 PROBLEM — N52.9 ERECTILE DYSFUNCTION: Status: ACTIVE | Noted: 2017-12-19

## 2018-02-20 ENCOUNTER — HOSPITAL ENCOUNTER (OUTPATIENT)
Dept: RADIATION ONCOLOGY | Age: 63
Discharge: HOME | End: 2018-02-20
Payer: OTHER GOVERNMENT

## 2018-02-20 DIAGNOSIS — C61 PROSTATE CANCER (HCC): ICD-10-CM

## 2018-02-20 LAB — PSA SERPL-MCNC: 0.5 NG/ML

## 2018-02-20 PROCEDURE — 36415 COLL VENOUS BLD VENIPUNCTURE: CPT | Performed by: NURSE PRACTITIONER

## 2018-02-20 PROCEDURE — 84153 ASSAY OF PSA TOTAL: CPT | Performed by: NURSE PRACTITIONER

## 2018-02-20 PROCEDURE — 84403 ASSAY OF TOTAL TESTOSTERONE: CPT | Performed by: NURSE PRACTITIONER

## 2018-02-21 LAB — TESTOST SERPL-MCNC: 275 NG/DL (ref 264–916)

## 2018-02-27 ENCOUNTER — HOSPITAL ENCOUNTER (OUTPATIENT)
Dept: RADIATION ONCOLOGY | Age: 63
Discharge: HOME | End: 2018-02-27
Payer: OTHER GOVERNMENT

## 2018-02-27 VITALS
BODY MASS INDEX: 34.56 KG/M2 | TEMPERATURE: 98.3 F | DIASTOLIC BLOOD PRESSURE: 76 MMHG | WEIGHT: 254.8 LBS | OXYGEN SATURATION: 97 % | HEART RATE: 78 BPM | SYSTOLIC BLOOD PRESSURE: 139 MMHG

## 2018-02-27 DIAGNOSIS — C61 PROSTATE CANCER (HCC): Primary | ICD-10-CM

## 2018-02-27 PROCEDURE — 99211 OFF/OP EST MAY X REQ PHY/QHP: CPT

## 2018-02-27 NOTE — PROGRESS NOTES
Patient: Cara Tamez MRN: [id number]  SSN: [id number]    YOB: 1955  Age: 65 y.o. Sex: male      DIAGNOSIS:  Adenocarcinoma of the prostate, T2a, Winder score 4+4 = 8, PSA 4.49    TREATMENT SITE AND DOSE DELIVERED:    1)  He received HDR brachytherapy under the direction of Dr. Ave Dhillon in [place] on 08/15/16. He also had SpaceOAR hydrogel placed on 08/17/16. Pelvis has received 37.8 Gy of 45 Gy in 25/25 fractions that he completed 10/10/16. HISTORY OF PRESENT ILLNESS: Cara Tamez is a 62year old male initially seen by Dr. Maura Turner on 03/16/16 at the request of Dr. Any Dawson for further discussion of management options for this high risk prostate cancer. He was found to have an elevated PSA of 4.49 by his primary care physician. He was then referred to Dr. Any Dawson for further evaluation. Dr. Any Dawson palpated a 1 cm nodule at the right apex. He has a family history of prostate cancer in his father. Prostate biopsy on 01/04/16 demonstrated a 36.17 g prostate for a PSA density of 0.12. Pathology showed Roxanna score 4+4 = 8 disease in 25% of the tissue from the left lateral base. 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 High-grade PIN was also seen in tissue from the right lateral base. No other biopsy cores contained cancer. MRI of the pelvis on 02/02/16 showed significant heterogeneity throughout the peripheral zone of the prostate. The most convincing focus of T2 signal loss was seen at the left posterior base where a nodule measuring 9 mm was seen. Abnormal signal change was seen in the bilateral seminal vesicles which was felt likely to represent hemorrhage from biopsy. No extracapsular disease was identified. No pelvic lymphadenopathy or other metastatic disease was seen in the pelvis or visualized abdomen. Bone scan on 02/02/16 showed no evidence of bony metastatic disease. He had a thorough discussion with Dr. Any Dawson regarding various treatment options.  He was interested in further discussion of radiation therapy.     INTERVAL HISTORY:  Fam Gutierrez is a 65 y.o. male now 16 months following his radiation treatment for his high risk prostate cancer. He received a single dose of Eligard on 4/7/16 and completed 6 months of casodex on 10/10/16. He has an AUA score of 5/35 and is \"pleased\" with his urinary function. He report his urinary function is back to his baseline. He continues with nocturia x1. Otherwise denies frequency, urgency, leakage, dysuria or hematuria. He has good force of stream. He is no longer on Flomax and doesn't notice a difference with his urinary function. He has good bowel function with occasional constipation. Energy is great. Feels well. He retired 11/1/16 and has opened up a [employer] with his son. He continues with ED. He has tried Viagra and Cialis in the past with no results. He was then prescribed trimix and states \"only last a minute. \"     GENERAL: The patient is well-developed, ambulatory, alert and in no acute distress. HEENT: Head is normocephalic, atraumatic. NECK: Neck is supple with no masses. CARDIOVASCULAR: Heart has regular rate and rhythm. RESPIRATORY: Lungs are clear to auscultation. LABS:   02/20/18: PSA 0.5 with testosterone of 275  08/15/17: PSA 0.3 with testosterone of 269  05/16/17: PSA 0.4 with testosterone of 161  02/07/17: PSA 0.3 with testosterone of 247  11/11/16: PSA <0.1 with testosterone of <3    ASSESSMENT:  Fam Gutierrez with Adenocarcinoma of the prostate, T2a, Roxanna score 4+4 = 8, PSA 4.49   He received HDR brachytherapy under the direction of Dr. Cayla Mcnamara in [id number] on 08/15/16 then radiation, completed 10/10/16. He is now 16 months after completing radiation therapy. He is recovering as anticipated from radiation and ADT therapy. His PSA is slightly elevated at 0.5, up from 0.3 that is most likely due to rising testosterone. He is concerned regarding his ED.  He has tried Cialis and Viagra with no results and recently trimix, also with no improvement. PLAN:  1) Follow up scheduled with Dr. Blanca Caldwell in 3 months. I will see him again in 6 months with PSA/Testosterone prior to visit. 2) He will discuss ED and the use of trimix with Dr. Blanca Caldwell at this next visit. 3) We discussed ongoing surveillance for his prostate cancer, which would include follow up every 3 months with  PSA/Testosterone x2 years then every 6 months generally. All questions were answered to the best of my ability. Zabrina Hameed NP   February 27, 2018      Portions of this note were copied from prior encounters and reviewed for accuracy, currency, and represent documentation and tasks completed during this encounter. I verify and attest these portions to be unchanged from prior visits.

## 2018-02-27 NOTE — PROGRESS NOTES
Pt here today for FUP post RT to the prostate which ended 10/2016. Pt is s/p HDR 8/2016, and Eligard shot one in 4/2016. His 2/20/18 PSA is 0.5, Testosterone 275. Pt also had SpaceOAR. His AUA score is 5/35 with mild c/o urinary frequency, urgency, weak stream, nocturia.

## 2018-06-18 ENCOUNTER — HOSPITAL ENCOUNTER (OUTPATIENT)
Dept: PHYSICAL THERAPY | Age: 63
Discharge: HOME | End: 2018-06-18
Payer: OTHER GOVERNMENT

## 2018-06-18 PROCEDURE — 97162 PT EVAL MOD COMPLEX 30 MIN: CPT

## 2018-06-18 PROCEDURE — 97110 THERAPEUTIC EXERCISES: CPT

## 2018-06-18 NOTE — THERAPY EVALUATION
Bill Ramez  : 1955  Primary: [facility]  Secondary:  [address]  at [place] [id number] [facility], [address], [place] [address].  [phone]   [phone]          OUTPATIENT PHYSICAL THERAPY:Initial Assessment 2018 1   ICD-10: Treatment Diagnosis: Low back pain (M54.5)  Precautions/Allergies:   Review of patient's allergies indicates no known allergies. Fall Risk Score: 3 (? 5 = High Risk)  MD Orders: PT- evaluate and treat, modalities, core strengthening and stretching MEDICAL/REFERRING DIAGNOSIS:  Spondylolisthesis, site unspecified [M43.10]  Other intervertebral disc degeneration, lumbar region [M51.36]  Spinal stenosis, lumbar region with neurogenic claudication [M48.062]  Other intervertebral disc displacement, lumbar region [M51.26]   DATE OF ONSET:  Initial injury . recurrence of symptoms  and increased severity   REFERRING PHYSICIAN: Zita Light PA  RETURN PHYSICIAN APPOINTMENT: 7/10/18     INITIAL ASSESSMENT:  Mr. Dyllan Du comes to therapy with complaints of sudden sharp low back pain with position changes and constant discomfort that progresses to pain and tingling to the right lateral calf. He reports decreasing tolerance to sitting, standing and now walking. He presents with core and bilateral hip weakness; very weak Quadratus lumborum muscles; significant bilateral hip soft tissue tightness and joint stiffness. Trunk ROM is limited by pain. Pt demonstrates very slow and deliberate compensatory movements during transfers. He will benefit from skilled therapy to address his deficits and assist in the return of functional gait, sitting and ADL's with less pain. PROBLEM LIST (Impacting functional limitations):  1. Decreased Strength  2. Decreased ADL/Functional Activities  3. Decreased Transfer Abilities  4. Decreased Ambulation Ability/Technique  5. Increased Pain  6. Decreased Activity Tolerance  7.  Decreased Flexibility/Joint Mobility INTERVENTIONS PLANNED:  1. Cold  2. Electrical Stimulation  3. Heat  4. Home Exercise Program (HEP)  5. Manual Therapy  6. Neuromuscular Re-education/Strengthening  7. Range of Motion (ROM)  8. Therapeutic Exercise/Strengthening  9. Ultrasound (US)  10. Aquatic exercises   TREATMENT PLAN:  Effective Dates: 6/18/2018 TO 9/17/2018 (90 days). Frequency/Duration: 2 to 3 times a week for 90 Days  GOALS: (Goals have been discussed and agreed upon with patient.)  Short-Term Functional Goals: Time Frame: 4 weeks    1. Independent with HEP   2. trunk and B hip AROM to WNL to allow independent ADL's with less compensation   3. Strength deficits improved by at least 1/3 muscle grade to allow safe transfers with less compensation  Discharge Goals: Time Frame: 90 days    1. Tolerate gait > 30 minutes for community integration    2. ADL's and transfers with pain < 4/10    3. Tolerate sitting > 45 minutes for community integration    4. Pt to demonstrate good body mechanics  Rehabilitation Potential For Stated Goals: Good  Regarding Mickey Page's therapy, I certify that the treatment plan above will be carried out by a therapist or under their direction. Thank you for this referral,  Astrid Galvez, PT ,MSPT      Referring Physician Signature: JONATHAN Obando              Date                    The information in this section was collected on 6/18/18 (except where otherwise noted). HISTORY:   History of Present Injury/Illness (Reason for Referral):  Pt reports hurting his back during  maneuver exercises where he was having to run over uneven ground carrying weapons. He went through trials of medication and rest with some relief. In 2017 he had the back pain return with the addition of right hip pain and leg tingling . Tried medication, rest and epidural without any long term relief.   Past Medical History/Comorbidities:   Mr. Johnny Gonzalez  has a past medical history of Bilateral knee pain; low back   Social History/Living Environment:     lives with wife in two story home. 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 Previous Treatment Approaches:          No PT  Personal Factors:    Discouraged about inability to exercise or even walk with his wife  Current Medications:       Current Outpatient Prescriptions:     cholecalciferol (VITAMIN D3) 1,000 unit cap, Take 1,000 Units by mouth daily. , Disp: , Rfl:     simvastatin (ZOCOR) 20 mg tablet, Take 20 mg by mouth every morning., Disp: , Rfl:     amLODIPine (NORVASC) 10 mg tablet, Take 10 mg by mouth every morning., Disp: , Rfl:    Date Last Reviewed:  6/18/18   Number of Personal Factors/Comorbidities that affect the Plan of Care: 1-2: MODERATE COMPLEXITY   EXAMINATION:   Observation/Orthostatic Postural Assessment:          Very slow and deliberate transitional movements. Slow compensatory movements during supine to sit. Good posture once up and walking. Decreased stance time on the right  Palpation:          Tender over L4-5 region. Stiff bilateral hip rotation  ROM:                     LLE AROM  L Hip Extension: -10  L Hip External Rotation: 25    RLE AROM  R Hip Extension: -5  R Hip External Rotation: 20                    Strength: testing painful in low back     LLE Strength  L Hip Extension: 4-  L Hip External Rotation: 4-    RLE Strength  R Hip Extension: 3+  R Hip ABduction: 4  R Hip Internal Rotation: 4-  R Hip External Rotation: 4-          Neurological Screen:  No numbness or tingling at the moment, positive quadrant with right trunk lean  Functional Mobility:   Slow with extra time and compensation   Body Structures Involved:  1. Nerves  2. Bones  3. Joints  4. Muscles  5. Ligaments Body Functions Affected:  1. Sensory/Pain  2. Neuromusculoskeletal  3. Movement Related Activities and Participation Affected:  1. General Tasks and Demands  2. Mobility  3. Domestic Life  4.  [employer], Social and [employer]   Number of elements (examined above) that affect the Plan of Care: 3: MODERATE COMPLEXITY   CLINICAL PRESENTATION:   Presentation: Evolving clinical presentation with changing clinical characteristics: MODERATE COMPLEXITY   CLINICAL DECISION MAKING:   Outcome Measure: Tool Used: Modified Oswestry Low Back Pain Questionnaire  Score:  Initial: 18/50  Most Recent: X/50 (Date: -- )   Interpretation of Score: Each section is scored on a 0-5 scale, 5 representing the greatest disability. The scores of each section are added together for a total score of 50. Score 0 1-10 11-20 21-30 31-40 41-49 50   Modifier CH CI CJ CK CL CM CN     Medical Necessity:   · Skilled intervention continues to be required due to need for manual treatment and modalities. Reason for Services/Other Comments:  · Patient continues to require skilled intervention due to need for guided progression into and through exercise program.   Use of outcome tool(s) and clinical judgement create a POC that gives a: Questionable prediction of patient's progress: MODERATE COMPLEXITY            TREATMENT:   (In addition to Assessment/Re-Assessment sessions the following treatments were rendered)  Pre-treatment Symptoms/Complaints:  Pt complains that his back pain increases within 5 minutes of sitting and standing. Pain goes down the right hip to the calf when he tries to get in/out of bed  Pain: Initial:     1-8/10 Post Session:  unchanged     THERAPEUTIC EXERCISE: (12 minutes):  Exercises to improve mobility and strength. Required moderate verbal and manual cues to promote proper body alignment, promote proper body posture, promote proper body breathing techniques and exercise technique. Reviewed and issued HEP with hooklying LTR, bridging, prone hip flexor stretch, seated hamstring stretch.   MANUAL THERAPY: (5 minutes): prone B hip flexor stretch  MedBridge Portal  Treatment/Session Assessment:    · Response to Treatment:  No increase in pain with treatment but transferring from the table causes pain. Good return demonstration of exercises. Very tight hip joints and soft tissue  · Compliance with Program/Exercises: Will assess as treatment progresses. · Recommendations/Intent for next treatment session: \"Next visit will focus on Manual treatment.  Modalities and exercise modification as needed  ·   Total Treatment Duration: 60 minutes  PT Patient Time In/Time Out  Time In: 1440  Time Out: [address], PT

## 2018-06-18 NOTE — PROGRESS NOTES
Ambulatory/Rehab Services H2 Model Falls Risk Assessment    Risk Factor Pts. ·   Confusion/Disorientation/Impulsivity  []    4 ·   Symptomatic Depression  []   2 ·   Altered Elimination  []   1 ·   Dizziness/Vertigo  []   1 ·   Gender (Male)  [x]   1 ·   Any administered antiepileptics (anticonvulsants):  []   2 ·   Any administered benzodiazepines:  []   1 ·   Visual Impairment (specify):  []   1 ·   Portable Oxygen Use  []   1 ·   Orthostatic ? BP  []   1 ·   History of Recent Falls (within 3 mos.)  []   5     Ability to Rise from Chair (choose one) Pts. ·   Ability to rise in a single movement  []   0 ·   Pushes up, successful in one attempt  [x]   1 ·   Multiple attempts, but successful  []   3 ·   Unable to rise without assistance  []   4   Total: (5 or greater = High Risk) 2     Falls Prevention Plan:   []                Physical Limitations to Exercise (specify):   []                Mobility Assistance Device (type):   []                Exercise/Equipment Adaptation (specify):    ©2010 [facility] [address] Patent #[id number].  Federal Law prohibits the replication, distribution or use without written permission from [facility] [place]

## 2018-06-20 ENCOUNTER — APPOINTMENT (OUTPATIENT)
Dept: PHYSICAL THERAPY | Age: 63
End: 2018-06-20
Payer: OTHER GOVERNMENT

## 2018-06-27 ENCOUNTER — HOSPITAL ENCOUNTER (OUTPATIENT)
Dept: PHYSICAL THERAPY | Age: 63
Discharge: HOME | End: 2018-06-27
Payer: OTHER GOVERNMENT

## 2018-06-27 PROCEDURE — 97110 THERAPEUTIC EXERCISES: CPT

## 2018-06-27 PROCEDURE — 97140 MANUAL THERAPY 1/> REGIONS: CPT

## 2018-06-27 NOTE — PROGRESS NOTES
Karyn Lao  : 1955  Primary: [facility]  Secondary:  [address] at [address], [address], [address] [facility]  [phone]   [phone]          OUTPATIENT PHYSICAL THERAPY:Daily Note 2018 2   ICD-10: Treatment Diagnosis: Low back pain (M54.5)  Precautions/Allergies:   Review of patient's allergies indicates no known allergies. Fall Risk Score: 3 (? 5 = High Risk)  MD Orders: PT- evaluate and treat, modalities, core strengthening and stretching MEDICAL/REFERRING DIAGNOSIS:  Spondylolisthesis, site unspecified [M43.10]  Other intervertebral disc degeneration, lumbar region [M51.36]  Spinal stenosis, lumbar region with neurogenic claudication [M48.062]  Other intervertebral disc displacement, lumbar region [M51.26]   DATE OF ONSET:  Initial injury . recurrence of symptoms  and increased severity 2017  REFERRING PHYSICIAN: Zita Light PA  RETURN PHYSICIAN APPOINTMENT: 7/10/18     INITIAL ASSESSMENT:  Mr. Dulce Maria Liu comes to therapy with complaints of sudden sharp low back pain with position changes and constant discomfort that progresses to pain and tingling to the right lateral calf. He reports decreasing tolerance to sitting, standing and now walking. He presents with core and bilateral hip weakness; very weak Quadratus lumborum muscles; significant bilateral hip soft tissue tightness and joint stiffness. Trunk ROM is limited by pain. Pt demonstrates very slow and deliberate compensatory movements during transfers. He will benefit from skilled therapy to address his deficits and assist in the return of functional gait, sitting and ADL's with less pain. PROBLEM LIST (Impacting functional limitations):  1. Decreased Strength  2. Decreased ADL/Functional Activities  3. Decreased Transfer Abilities  4. Decreased Ambulation Ability/Technique  5. Increased Pain  6. Decreased Activity Tolerance  7.  Decreased Flexibility/Joint Mobility INTERVENTIONS PLANNED:  1. Cold  2. Electrical Stimulation  3. Heat  4. Home Exercise Program (HEP)  5. Manual Therapy  6. Neuromuscular Re-education/Strengthening  7. Range of Motion (ROM)  8. Therapeutic Exercise/Strengthening  9. Ultrasound (US)  10. Aquatic exercises   TREATMENT PLAN:  Effective Dates: 6/18/2018 TO 9/17/2018 (90 days). Frequency/Duration: 2 to 3 times a week for 90 Days  GOALS: (Goals have been discussed and agreed upon with patient.)  Short-Term Functional Goals: Time Frame: 4 weeks    1. Independent with HEP   2. trunk and B hip AROM to WNL to allow independent ADL's with less compensation   3. Strength deficits improved by at least 1/3 muscle grade to allow safe transfers with less compensation  Discharge Goals: Time Frame: 90 days    1. Tolerate gait > 30 minutes for community integration    2. ADL's and transfers with pain < 4/10    3. Tolerate sitting > 45 minutes for community integration    4. Pt to demonstrate good body mechanics  Rehabilitation Potential For Stated Goals: Good  Regarding Mickey Page's therapy, I certify that the treatment plan above will be carried out by a therapist or under their direction. Thank you for this referral,  Erlin Ellis, PT ,MSPT                  The information in this section was collected on 6/18/18 (except where otherwise noted). HISTORY:   History of Present Injury/Illness (Reason for Referral):  Pt reports hurting his back during  maneuver exercises where he was having to run over uneven ground carrying weapons. He went through trials of medication and rest with some relief. In 2017 he had the back pain return with the addition of right hip pain and leg tingling . Tried medication, rest and epidural without any long term relief. Past Medical History/Comorbidities:   Mr. Lyndsey Scott  has a past medical history of Bilateral knee pain; low back   Social History/Living Environment:     lives with wife in two story home.   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 Previous Treatment Approaches:          No PT  Personal Factors:    Discouraged about inability to exercise or even walk with his wife  Current Medications:       Current Outpatient Prescriptions:     cholecalciferol (VITAMIN D3) 1,000 unit cap, Take 1,000 Units by mouth daily. , Disp: , Rfl:     simvastatin (ZOCOR) 20 mg tablet, Take 20 mg by mouth every morning., Disp: , Rfl:     amLODIPine (NORVASC) 10 mg tablet, Take 10 mg by mouth every morning., Disp: , Rfl:    Date Last Reviewed:  6/27/18   Number of Personal Factors/Comorbidities that affect the Plan of Care: 1-2: MODERATE COMPLEXITY   EXAMINATION:   Observation/Orthostatic Postural Assessment:          Very slow and deliberate transitional movements. Slow compensatory movements during supine to sit. Right trunk lean while walking. Decreased stance time on the right  Palpation:          Tender over right SI joint. Stiff bilateral hip rotation with the right more so than the left  ROM:                     LLE AROM  L Hip Extension: 10 (after treatment)    RLE AROM  R Hip Extension: 10 (after treatment)  R Hip External Rotation: 25 (after treatment)                    Strength: testing painful in low back          RLE Strength  R Hip Extension: 4- (after treatment)          Neurological Screen: at evaluation: No numbness or tingling at the moment,  positive quadrant with right trunk lean at evaluation  Functional Mobility:   Slow with extra time and compensation   Body Structures Involved:  1. Nerves  2. Bones  3. Joints  4. Muscles  5. Ligaments Body Functions Affected:  1. Sensory/Pain  2. Neuromusculoskeletal  3. Movement Related Activities and Participation Affected:  1. General Tasks and Demands  2. Mobility  3. Domestic Life  4.  Community, Social and [place]   Number of elements (examined above) that affect the Plan of Care: 3: MODERATE COMPLEXITY   CLINICAL PRESENTATION:   Presentation: Evolving clinical presentation with changing clinical characteristics: MODERATE COMPLEXITY   CLINICAL DECISION MAKING:   Outcome Measure: Tool Used: Modified Oswestry Low Back Pain Questionnaire  Score:  Initial: 18/50  Most Recent: X/50 (Date: -- )   Interpretation of Score: Each section is scored on a 0-5 scale, 5 representing the greatest disability. The scores of each section are added together for a total score of 50. Score 0 1-10 11-20 21-30 31-40 41-49 50   Modifier CH CI CJ CK CL CM CN     Medical Necessity:   · Skilled intervention continues to be required due to need for manual treatment and modalities. Reason for Services/Other Comments:  · Patient continues to require skilled intervention due to need for guided progression into and through exercise program.   Use of outcome tool(s) and clinical judgement create a POC that gives a: Questionable prediction of patient's progress: MODERATE COMPLEXITY            TREATMENT:   (In addition to Assessment/Re-Assessment sessions the following treatments were rendered)  Pre-treatment Symptoms/Complaints:  Pt reports back pain increases at night keeping him awake. Pain goes down the right hip to the calf when he tries to get in/out of bed and stretch. Did HEP and wife assisted with prone stretches. Started to feel good yesterday  Pain: Initial:     2-6/10 SI joint and low back Post Session: 4-5     THERAPEUTIC EXERCISE: (30 minutes):  Exercises to improve mobility and strength. Required moderate verbal and manual cues to promote proper body alignment, promote proper body posture, promote proper body breathing techniques and exercise technique. Reviewed  HEP with hooklying LTR, bridging, seated hamstring stretch. Attempted to teach core activation but was cramping in abdominals.    Date:  6/27/18 Date:   Date:     Activity/Exercise Parameters Parameters Parameters   Alternating isometrics At LE over Tball while supine     LTR Short arc on tball x 10     bridging Short arc over tball 2x8     Pelvic tilt Short arc LE over tball 2x8     Supine abdom press LE on tball 2x5                     MANUAL THERAPY: (15 minutes): prone B hip flexor stretch; grade 3 to 4-- physiological mobilizations B hip rotation  MedBridge Portal  Treatment/Session Assessment:    · Response to Treatment: weak and stiff right hip likely contributing to lateral trunk lean during gait. Good return demonstration of exercises. Very tight and stiff hips but responded well to stretching  · Compliance with Program/Exercises: yes,per pt  · Recommendations/Intent for next treatment session: \"Next visit will focus on Manual treatment.  Modalities and exercise modification as needed  ·   Total Treatment Duration: 45 minutes  PT Patient Time In/Time Out  Time In: 1350  Time Out: [id number], PT

## 2018-07-06 ENCOUNTER — APPOINTMENT (OUTPATIENT)
Dept: PHYSICAL THERAPY | Age: 63
End: 2018-07-06
Payer: OTHER GOVERNMENT

## 2018-07-10 ENCOUNTER — HOSPITAL ENCOUNTER (OUTPATIENT)
Dept: PHYSICAL THERAPY | Age: 63
Discharge: HOME | End: 2018-07-10
Payer: OTHER GOVERNMENT

## 2018-07-10 PROCEDURE — 97140 MANUAL THERAPY 1/> REGIONS: CPT

## 2018-07-10 PROCEDURE — 97110 THERAPEUTIC EXERCISES: CPT

## 2018-07-10 NOTE — PROGRESS NOTES
John Rodriguezing  : 1955  Primary: [facility] [facility]  Secondary:  [address]  at [facility] FIONA CULLEN  [address] [facility], [address] [place] [address],[address], [facility] [address].  [phone]   [phone]          OUTPATIENT PHYSICAL THERAPY:Daily Note 7/10/2018 3   ICD-10: Treatment Diagnosis: Low back pain (M54.5)  Precautions/Allergies:   Review of patient's allergies indicates no known allergies. Fall Risk Score: 3 (? 5 = High Risk)  MD Orders: PT- evaluate and treat, modalities, core strengthening and stretching MEDICAL/REFERRING DIAGNOSIS:  Spondylolisthesis, site unspecified [M43.10]  Other intervertebral disc degeneration, lumbar region [M51.36]  Spinal stenosis, lumbar region with neurogenic claudication [M48.062]  Other intervertebral disc displacement, lumbar region [M51.26]   DATE OF ONSET:  Initial injury . recurrence of symptoms  and increased severity   REFERRING PHYSICIAN: Zita Light PA  RETURN PHYSICIAN APPOINTMENT: 7/10/18     INITIAL ASSESSMENT:  Mr. Nathanael Bentley comes to therapy with complaints of sudden sharp low back pain with position changes and constant discomfort that progresses to pain and tingling to the right lateral calf. He reports decreasing tolerance to sitting, standing and now walking. He presents with core and bilateral hip weakness; very weak Quadratus lumborum muscles; significant bilateral hip soft tissue tightness and joint stiffness. Trunk ROM is limited by pain. Pt demonstrates very slow and deliberate compensatory movements during transfers. He will benefit from skilled therapy to address his deficits and assist in the return of functional gait, sitting and ADL's with less pain. PROBLEM LIST (Impacting functional limitations):  1. Decreased Strength  2. Decreased ADL/Functional Activities  3. Decreased Transfer Abilities  4. Decreased Ambulation Ability/Technique  5. Increased Pain  6. Decreased Activity Tolerance  7.  Decreased Flexibility/Joint Mobility INTERVENTIONS PLANNED:  1. Cold  2. Electrical Stimulation  3. Heat  4. Home Exercise Program (HEP)  5. Manual Therapy  6. Neuromuscular Re-education/Strengthening  7. Range of Motion (ROM)  8. Therapeutic Exercise/Strengthening  9. Ultrasound (US)  10. Aquatic exercises   TREATMENT PLAN:  Effective Dates: 6/18/2018 TO 9/17/2018 (90 days). Frequency/Duration: 2 to 3 times a week for 90 Days  GOALS: (Goals have been discussed and agreed upon with patient.)  Short-Term Functional Goals: Time Frame: 4 weeks    1. Independent with HEP   2. trunk and B hip AROM to WNL to allow independent ADL's with less compensation   3. Strength deficits improved by at least 1/3 muscle grade to allow safe transfers with less compensation  Discharge Goals: Time Frame: 90 days    1. Tolerate gait > 30 minutes for community integration    2. ADL's and transfers with pain < 4/10    3. Tolerate sitting > 45 minutes for community integration    4. Pt to demonstrate good body mechanics  Rehabilitation Potential For Stated Goals: Good  Regarding Mickey Page's therapy, I certify that the treatment plan above will be carried out by a therapist or under their direction. Thank you for this referral,  Josh Green, PT ,MSPT                  The information in this section was collected on 6/18/18 (except where otherwise noted). HISTORY:   History of Present Injury/Illness (Reason for Referral):  Pt reports hurting his back during  maneuver exercises where he was having to run over uneven ground carrying weapons. He went through trials of medication and rest with some relief. In 2017 he had the back pain return with the addition of right hip pain and leg tingling . Tried medication, rest and epidural without any long term relief. Past Medical History/Comorbidities:   Mr. Nathanael Bentley  has a past medical history of Bilateral knee pain; low back   Social History/Living Environment:     lives with wife in two story home.   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 Previous Treatment Approaches:          No PT  Personal Factors:    Discouraged about inability to exercise or even walk with his wife  Current Medications:       Current Outpatient Prescriptions:     cholecalciferol (VITAMIN D3) 1,000 unit cap, Take 1,000 Units by mouth daily. , Disp: , Rfl:     simvastatin (ZOCOR) 20 mg tablet, Take 20 mg by mouth every morning., Disp: , Rfl:     amLODIPine (NORVASC) 10 mg tablet, Take 10 mg by mouth every morning., Disp: , Rfl:    Date Last Reviewed:  7/10/18   Number of Personal Factors/Comorbidities that affect the Plan of Care: 1-2: MODERATE COMPLEXITY   EXAMINATION:   Observation/Orthostatic Postural Assessment:       More fluent transitional movements. Right trunk lean while walking. Palpation:          Tender over right L5. Stiff bilateral hip rotation with the right more so than the left  ROM:                                               Strength: testing painful in low back                     Neurological Screen: at evaluation: No numbness or tingling at the moment,  positive quadrant with right trunk lean at evaluation  Functional Mobility:   Slow with extra time and compensation   Body Structures Involved:  1. Nerves  2. Bones  3. Joints  4. Muscles  5. Ligaments Body Functions Affected:  1. Sensory/Pain  2. Neuromusculoskeletal  3. Movement Related Activities and Participation Affected:  1. General Tasks and Demands  2. Mobility  3. Domestic Life  4. Community, Social and [place]   Number of elements (examined above) that affect the Plan of Care: 3: MODERATE COMPLEXITY   CLINICAL PRESENTATION:   Presentation: Evolving clinical presentation with changing clinical characteristics: MODERATE COMPLEXITY   CLINICAL DECISION MAKING:   Outcome Measure:    Tool Used: Modified Oswestry Low Back Pain Questionnaire  Score:  Initial: 18/50  Most Recent: X/50 (Date: -- )   Interpretation of Score: Each section is scored on a 0-5 scale, 5 representing the greatest disability. The scores of each section are added together for a total score of 50. Score 0 1-10 11-20 21-30 31-40 41-49 50   Modifier CH CI CJ CK CL CM CN     Medical Necessity:   · Skilled intervention continues to be required due to need for manual treatment and modalities. Reason for Services/Other Comments:  · Patient continues to require skilled intervention due to need for guided progression into and through exercise program.   Use of outcome tool(s) and clinical judgement create a POC that gives a: Questionable prediction of patient's progress: MODERATE COMPLEXITY            TREATMENT:   (In addition to Assessment/Re-Assessment sessions the following treatments were rendered)  Pre-treatment Symptoms/Complaints:  Pt reports back pain from driving to and from [place] this weekend. Sore in the hips after last therapy session      Pain: Initial:     3-5/10  low back Post Session: \"its ok, feels better\" 2-3/10     THERAPEUTIC EXERCISE: (33  minutes):  Exercises to improve mobility and strength. Required moderate verbal and manual cues to promote proper body alignment, promote proper body posture, max cueing for proper breathing technique/core activation wiht exercise.      Date:  6/27/18 Date:  7/10/18 Date:     Activity/Exercise Parameters Parameters Parameters   Alternating isometrics At LE over Tball while supine At LE over tball with core    LTR Short arc on tball x 10 Short arc x20 with core    bridging Short arc over tball 2x8 Short arc over tball x20    Pelvic tilt Short arc LE over tball 2x8 Over tball x20    Supine abdom press LE on tball 2x5 LE over tball x10    Core activation  Prone x10    Hip IR  Red tband 2x10 prone with core    Modalities: pt declined ice    MANUAL THERAPY: (10 minutes): prone B hip flexor stretch; grade 3 to 4-- physiological mobilizations B hip external rotation  MedBridge Portal  Treatment/Session Assessment:    · Response to Treatment: weak core, hip IR and extn  · Compliance with Program/Exercises: some,per pt  · Recommendations/Intent for next treatment session:  \"Next visit will focus on Manual treatment and exercise modification as needed for core  ·   Total Treatment Duration: 43 minutes  PT Patient Time In/Time Out  Time In: 0955  Time Out: [address] [facility], PT

## 2018-07-11 ENCOUNTER — HOSPITAL ENCOUNTER (OUTPATIENT)
Dept: PHYSICAL THERAPY | Age: 63
Discharge: HOME | End: 2018-07-11
Payer: OTHER GOVERNMENT

## 2018-07-11 PROCEDURE — 97140 MANUAL THERAPY 1/> REGIONS: CPT

## 2018-07-11 PROCEDURE — 97110 THERAPEUTIC EXERCISES: CPT

## 2018-07-11 NOTE — PROGRESS NOTES
Nesha Ortega  : 1955  Primary: [facility]  [facility]:  [address] [facility], [address], [place] [address].  [phone]   [phone]          OUTPATIENT PHYSICAL THERAPY:Daily Note 2018 4   ICD-10: Treatment Diagnosis: Low back pain (M54.5)  Precautions/Allergies:   Review of patient's allergies indicates no known allergies. Fall Risk Score: 3 (? 5 = High Risk)  MD Orders: PT- evaluate and treat, modalities, core strengthening and stretching MEDICAL/REFERRING DIAGNOSIS:  Spondylolisthesis, site unspecified [M43.10]  Other intervertebral disc degeneration, lumbar region [M51.36]  Spinal stenosis, lumbar region with neurogenic claudication [M48.062]  Other intervertebral disc displacement, lumbar region [M51.26]   DATE OF ONSET:  Initial injury . recurrence of symptoms  and increased severity 2017  REFERRING PHYSICIAN: Zita Light PA  RETURN PHYSICIAN APPOINTMENT: 7/10/18     INITIAL ASSESSMENT:  Mr. Dhruv Farah comes to therapy with complaints of sudden sharp low back pain with position changes and constant discomfort that progresses to pain and tingling to the right lateral calf. He reports decreasing tolerance to sitting, standing and now walking. He presents with core and bilateral hip weakness; very weak Quadratus lumborum muscles; significant bilateral hip soft tissue tightness and joint stiffness. Trunk ROM is limited by pain. Pt demonstrates very slow and deliberate compensatory movements during transfers. He will benefit from skilled therapy to address his deficits and assist in the return of functional gait, sitting and ADL's with less pain. PROBLEM LIST (Impacting functional limitations):  1. Decreased Strength  2. Decreased ADL/Functional Activities  3. Decreased Transfer Abilities  4. Decreased Ambulation Ability/Technique  5. Increased Pain  6. Decreased Activity Tolerance  7.  Decreased Flexibility/Joint Mobility INTERVENTIONS PLANNED:  1. Cold  2. Electrical Stimulation  3. Heat  4. Home Exercise Program (HEP)  5. Manual Therapy  6. Neuromuscular Re-education/Strengthening  7. Range of Motion (ROM)  8. Therapeutic Exercise/Strengthening  9. Ultrasound (US)  10. Aquatic exercises   TREATMENT PLAN:  Effective Dates: 6/18/2018 TO 9/17/2018 (90 days). Frequency/Duration: 2 to 3 times a week for 90 Days  GOALS: (Goals have been discussed and agreed upon with patient.)  Short-Term Functional Goals: Time Frame: 4 weeks    1. Independent with HEP   2. trunk and B hip AROM to WNL to allow independent ADL's with less compensation   3. Strength deficits improved by at least 1/3 muscle grade to allow safe transfers with less compensation  Discharge Goals: Time Frame: 90 days    1. Tolerate gait > 30 minutes for community integration    2. ADL's and transfers with pain < 4/10    3. Tolerate sitting > 45 minutes for community integration    4. Pt to demonstrate good body mechanics  Rehabilitation Potential For Stated Goals: Good  Regarding Mickey Page's therapy, I certify that the treatment plan above will be carried out by a therapist or under their direction. Thank you for this referral,  Abraham Reid, PT ,MSPT                  The information in this section was collected on 6/18/18 (except where otherwise noted). HISTORY:   History of Present Injury/Illness (Reason for Referral):  Pt reports hurting his back during  maneuver exercises where he was having to run over uneven ground carrying weapons. He went through trials of medication and rest with some relief. In 2017 he had the back pain return with the addition of right hip pain and leg tingling . Tried medication, rest and epidural without any long term relief. Past Medical History/Comorbidities:   Mr. Lester Samuel  has a past medical history of Bilateral knee pain; low back   Social History/Living Environment:     lives with wife in two story home.   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 Previous Treatment Approaches:          No PT  Personal Factors:    Discouraged about inability to exercise or even walk with his wife  Current Medications:       Current Outpatient Prescriptions:     cholecalciferol (VITAMIN D3) 1,000 unit cap, Take 1,000 Units by mouth daily. , Disp: , Rfl:     simvastatin (ZOCOR) 20 mg tablet, Take 20 mg by mouth every morning., Disp: , Rfl:     amLODIPine (NORVASC) 10 mg tablet, Take 10 mg by mouth every morning., Disp: , Rfl:    Date Last Reviewed:  7/10/18   Number of Personal Factors/Comorbidities that affect the Plan of Care: 1-2: MODERATE COMPLEXITY   EXAMINATION:   Observation/Orthostatic Postural Assessment:       More fluent transitional movements. Right trunk lean while walking. Palpation:          Tender over right L5. Stiff bilateral hip rotation with the right more so than the left  ROM:                                               Strength: testing painful in low back                     Neurological Screen: at evaluation: No numbness or tingling at the moment,  positive quadrant with right trunk lean at evaluation  Functional Mobility:   Slow with extra time and compensation   Body Structures Involved:  1. Nerves  2. Bones  3. Joints  4. Muscles  5. Ligaments Body Functions Affected:  1. Sensory/Pain  2. Neuromusculoskeletal  3. Movement Related Activities and Participation Affected:  1. General Tasks and Demands  2. Mobility  3. Domestic Life  4. Community, Social and [place]   Number of elements (examined above) that affect the Plan of Care: 3: MODERATE COMPLEXITY   CLINICAL PRESENTATION:   Presentation: Evolving clinical presentation with changing clinical characteristics: MODERATE COMPLEXITY   CLINICAL DECISION MAKING:   Outcome Measure:    Tool Used: Modified Oswestry Low Back Pain Questionnaire  Score:  Initial: 18/50  Most Recent: X/50 (Date: -- )   Interpretation of Score: Each section is scored on a 0-5 scale, 5 representing the greatest disability. The scores of each section are added together for a total score of 50. Score 0 1-10 11-20 21-30 31-40 41-49 50   Modifier CH CI CJ CK CL CM CN     Medical Necessity:   · Skilled intervention continues to be required due to need for manual treatment and modalities. Reason for Services/Other Comments:  · Patient continues to require skilled intervention due to need for guided progression into and through exercise program.   Use of outcome tool(s) and clinical judgement create a POC that gives a: Questionable prediction of patient's progress: MODERATE COMPLEXITY            TREATMENT:   (In addition to Assessment/Re-Assessment sessions the following treatments were rendered)  Pre-treatment Symptoms/Complaints:  Pt reports back feels much better today. Just soreness. pain from driving to and from [facility] [place] this weekend. Sore in the hips after last therapy session      Pain: Initial:     1/10  low back. R hip is stiff Post Session: \"its ok, feels better\" 2-3/10     THERAPEUTIC EXERCISE: (15  minutes):  Exercises to improve mobility and strength. Required moderate verbal and manual cues to promote proper body alignment, promote proper body posture, max cueing for proper exhalation technique withcore activation during exercise.      Date:  6/27/18 Date:  7/10/18 Date:  7/11/18   Activity/Exercise Parameters Parameters Parameters   Alternating isometrics At LE over Tball while supine At LE over tball with core    LTR Short arc on tball x 10 Short arc x20 with core Short arc x 10 over green tball   bridging Short arc over tball 2x8 Short arc over tball x20 Short arc over green tball x10   Pelvic tilt Short arc LE over tball 2x8 Over tball x20 voer green tball x10   Supine abdom press LE on tball 2x5 LE over tball x10 LE over green tball x10   Core activation  Prone x10 Prone x 10   Hip IR  Red tband 2x10 prone with core    Modalities: pt declined ice    MANUAL THERAPY: (25 minutes): prone B hip flexor stretch; grade 4-- to 4 physiological mobilizations B hip external rotation; grade 4 right femoral PA glides at end hip ER    MedBridge Portal  Treatment/Session Assessment:    · Response to Treatment: weak core making activation difficult. Stiff R hip   · Compliance with Program/Exercises: some,per pt  · Recommendations/Intent for next treatment session:  \"Next visit will focus on Manual treatment and exercise modification as needed for core  ·   Total Treatment Duration:   40    minutes  PT Patient Time In/Time Out  Time In: 1310  Time Out: [address], PT          n

## 2018-07-17 ENCOUNTER — HOSPITAL ENCOUNTER (OUTPATIENT)
Dept: PHYSICAL THERAPY | Age: 63
Discharge: HOME | End: 2018-07-17
Payer: OTHER GOVERNMENT

## 2018-07-17 PROCEDURE — 97110 THERAPEUTIC EXERCISES: CPT

## 2018-07-17 PROCEDURE — 97140 MANUAL THERAPY 1/> REGIONS: CPT

## 2018-07-17 PROCEDURE — 97035 APP MDLTY 1+ULTRASOUND EA 15: CPT

## 2018-07-17 NOTE — PROGRESS NOTES
Nesha Ortega  : 1955  Primary: [facility]  Secondary:  [address] [facility], [address], [address] [facility] [place]  [phone]   [phone]          OUTPATIENT PHYSICAL THERAPY:Daily Note 2018 5   ICD-10: Treatment Diagnosis: Low back pain (M54.5)  Precautions/Allergies:   Review of patient's allergies indicates no known allergies. Fall Risk Score: 3 (? 5 = High Risk)  MD Orders: PT- evaluate and treat, modalities, core strengthening and stretching MEDICAL/REFERRING DIAGNOSIS:  Spondylolisthesis, site unspecified [M43.10]  Other intervertebral disc degeneration, lumbar region [M51.36]  Spinal stenosis, lumbar region with neurogenic claudication [M48.062]  Other intervertebral disc displacement, lumbar region [M51.26]   DATE OF ONSET:  Initial injury . recurrence of symptoms  and increased severity   REFERRING PHYSICIAN: Zita Light PA  RETURN PHYSICIAN APPOINTMENT: unsure     INITIAL ASSESSMENT:  Mr. Dhruv Farah comes to therapy with complaints of sudden sharp low back pain with position changes and constant discomfort that progresses to pain and tingling to the right lateral calf. He reports decreasing tolerance to sitting, standing and now walking. He presents with core and bilateral hip weakness; very weak Quadratus lumborum muscles; significant bilateral hip soft tissue tightness and joint stiffness. Trunk ROM is limited by pain. Pt demonstrates very slow and deliberate compensatory movements during transfers. He will benefit from skilled therapy to address his deficits and assist in the return of functional gait, sitting and ADL's with less pain. PROBLEM LIST (Impacting functional limitations):  1. Decreased Strength  2. Decreased ADL/Functional Activities  3. Decreased Transfer Abilities  4. Decreased Ambulation Ability/Technique  5. Increased Pain  6. Decreased Activity Tolerance  7.  Decreased Flexibility/Joint Mobility INTERVENTIONS PLANNED:  1. Cold  2. Electrical Stimulation  3. Heat  4. Home Exercise Program (HEP)  5. Manual Therapy  6. Neuromuscular Re-education/Strengthening  7. Range of Motion (ROM)  8. Therapeutic Exercise/Strengthening  9. Ultrasound (US)  10. Aquatic exercises   TREATMENT PLAN:  Effective Dates: 6/18/2018 TO 9/17/2018 (90 days). Frequency/Duration: 2 to 3 times a week for 90 Days  GOALS: (Goals have been discussed and agreed upon with patient.)  Short-Term Functional Goals: Time Frame: 4 weeks    1. Independent with HEP   2. trunk and B hip AROM to WNL to allow independent ADL's with less compensation   3. Strength deficits improved by at least 1/3 muscle grade to allow safe transfers with less compensation  Discharge Goals: Time Frame: 90 days    1. Tolerate gait > 30 minutes for community integration    2. ADL's and transfers with pain < 4/10    3. Tolerate sitting > 45 minutes for community integration    4. Pt to demonstrate good body mechanics  Rehabilitation Potential For Stated Goals: Good  Regarding Mickey Page's therapy, I certify that the treatment plan above will be carried out by a therapist or under their direction. Thank you for this referral,  Milton Gibson, PT ,MSPT                  The information in this section was collected on 6/18/18 (except where otherwise noted). HISTORY:   History of Present Injury/Illness (Reason for Referral):  Pt reports hurting his back during  maneuver exercises where he was having to run over uneven ground carrying weapons. He went through trials of medication and rest with some relief. In 2017 he had the back pain return with the addition of right hip pain and leg tingling . Tried medication, rest and epidural without any long term relief. Past Medical History/Comorbidities:   Mr. Kwame Card  has a past medical history of Bilateral knee pain; low back   Social History/Living Environment:     lives with wife in two story home.   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 Previous Treatment Approaches:          No PT  Personal Factors:    Discouraged about inability to exercise or even walk with his wife  Current Medications:       Current Outpatient Prescriptions:     cholecalciferol (VITAMIN D3) 1,000 unit cap, Take 1,000 Units by mouth daily. , Disp: , Rfl:     simvastatin (ZOCOR) 20 mg tablet, Take 20 mg by mouth every morning., Disp: , Rfl:     amLODIPine (NORVASC) 10 mg tablet, Take 10 mg by mouth every morning., Disp: , Rfl:    Date Last Reviewed:  7/17/18   Number of Personal Factors/Comorbidities that affect the Plan of Care: 1-2: MODERATE COMPLEXITY   EXAMINATION:   Observation/Orthostatic Postural Assessment:       More fluent transitional movements but slow due to pain. Palpation:          Tender over right L5 and low back paraspinals. Stiff bilateral hip rotation with cramping of the right hamstring  ROM:   n/t                                            Strength: testing painful in low back                     Neurological Screen: at evaluation: No numbness or tingling at the moment,  positive quadrant with right trunk lean at evaluation  Functional Mobility:   Slow with extra time and compensation   Body Structures Involved:  1. Nerves  2. Bones  3. Joints  4. Muscles  5. Ligaments Body Functions Affected:  1. Sensory/Pain  2. Neuromusculoskeletal  3. Movement Related Activities and Participation Affected:  1. General Tasks and Demands  2. Mobility  3. Domestic Life  4. Community, Social and [place]   Number of elements (examined above) that affect the Plan of Care: 3: MODERATE COMPLEXITY   CLINICAL PRESENTATION:   Presentation: Evolving clinical presentation with changing clinical characteristics: MODERATE COMPLEXITY   CLINICAL DECISION MAKING:   Outcome Measure:    Tool Used: Modified Oswestry Low Back Pain Questionnaire  Score:  Initial: 18/50  Most Recent: X/50 (Date: -- ) Interpretation of Score: Each section is scored on a 0-5 scale, 5 representing the greatest disability. The scores of each section are added together for a total score of 50. Score 0 1-10 11-20 21-30 31-40 41-49 50   Modifier CH CI CJ CK CL CM CN     Medical Necessity:   · Skilled intervention continues to be required due to need for manual treatment and modalities. Reason for Services/Other Comments:  · Patient continues to require skilled intervention due to need for guided progression into and through exercise program.   Use of outcome tool(s) and clinical judgement create a POC that gives a: Questionable prediction of patient's progress: MODERATE COMPLEXITY            TREATMENT:   (In addition to Assessment/Re-Assessment sessions the following treatments were rendered)  Pre-treatment Symptoms/Complaints:  Pt reports back feels sore and not sure why. hips are ok, therapy is helping alot. Pain: Initial:     4-5 /10  low back. R hip is 2/10  Post Session: \"its ok, feels better\" 2-3/10     THERAPEUTIC EXERCISE: ( 10  minutes):  Exercises to improve mobility and strength. Required moderate verbal cues to promote proper body alignment, promote proper body posture, max cueing for proper exhalation technique withcore activation during exercise.     date Date:  6/27/18 Date:  7/10/18 Date:  7/11/18 7/17/18    Activity/Exercise Parameters Parameters Parameters     Alternating isometrics At LE over Tball while supine At LE over tball with core      LTR Short arc on tball x 10 Short arc x20 with core Short arc x 10 over green tball Short arc 1# B ankles x20    bridging Short arc over tball 2x8 Short arc over tball x20 Short arc over green tball x10     Pelvic tilt Short arc LE over tball 2x8 Over tball x20 voer green tball x10 Hook lying x20    Supine abdom press LE on tball 2x5 LE over tball x10 LE over green tball x10 With exhalation LE over green tball x 24    Core activation  Prone x10 Prone x 10     Hip IR  Red tband 2x10 prone with core  No due to hamstring cramps    Modalities:(9 minutes): continuous ultrasound to low back at 2.0 w/cm2 for mechanical effects on tissue prior to manual and exercises    MANUAL THERAPY: ( 25 minutes): prone B hip flexor stretch; grade 4-- to 4 physiological mobilizations B hip external rotation; deep tissue release          MedBridge Portal  Treatment/Session Assessment:    · Response to Treatment: much better core activation. Stiff B hips today with right hamstring cramping  · Compliance with Program/Exercises: some,per pt  · Recommendations/Intent for next treatment session: \"Next visit will focus on Manual treatment and exercise modification as needed for core.   ·   Total Treatment Duration:   44    minutes  PT Patient Time In/Time Out  Time In: 1300  Time Out: Gonzalez 25, PT          n

## 2018-07-18 ENCOUNTER — HOSPITAL ENCOUNTER (OUTPATIENT)
Dept: PHYSICAL THERAPY | Age: 63
Discharge: HOME | End: 2018-07-18
Payer: OTHER GOVERNMENT

## 2018-07-18 ENCOUNTER — APPOINTMENT (OUTPATIENT)
Dept: PHYSICAL THERAPY | Age: 63
End: 2018-07-18
Payer: OTHER GOVERNMENT

## 2018-07-18 NOTE — PROGRESS NOTES
Vikram Montano  : 1955  Primary: [facility]  Secondary:  [address] at [facility] FIONA CULLEN  [address] [facility], [address] [place] [address] [address], [place] [address].  [phone]   [phone]          OUTPATIENT PHYSICAL THERAPY:Daily Note 2018 6   ICD-10: Treatment Diagnosis: Low back pain (M54.5)  Precautions/Allergies:   Review of patient's allergies indicates no known allergies. Fall Risk Score: 3 (? 5 = High Risk)  MD Orders: PT- evaluate and treat, modalities, core strengthening and stretching MEDICAL/REFERRING DIAGNOSIS:  Spondylolisthesis, site unspecified [M43.10]  Other intervertebral disc degeneration, lumbar region [M51.36]  Spinal stenosis, lumbar region with neurogenic claudication [M48.062]  Other intervertebral disc displacement, lumbar region [M51.26]   DATE OF ONSET:  Initial injury . recurrence of symptoms  and increased severity   REFERRING PHYSICIAN: Zita Light PA  RETURN PHYSICIAN APPOINTMENT: unsure     INITIAL ASSESSMENT:  Mr. Edinson Ramon comes to therapy with complaints of sudden sharp low back pain with position changes and constant discomfort that progresses to pain and tingling to the right lateral calf. He reports decreasing tolerance to sitting, standing and now walking. He presents with core and bilateral hip weakness; very weak Quadratus lumborum muscles; significant bilateral hip soft tissue tightness and joint stiffness. Trunk ROM is limited by pain. Pt demonstrates very slow and deliberate compensatory movements during transfers. He will benefit from skilled therapy to address his deficits and assist in the return of functional gait, sitting and ADL's with less pain. PROBLEM LIST (Impacting functional limitations):  1. Decreased Strength  2. Decreased ADL/Functional Activities  3. Decreased Transfer Abilities  4. Decreased Ambulation Ability/Technique  5. Increased Pain  6. Decreased Activity Tolerance  7.  Decreased Flexibility/Joint Mobility INTERVENTIONS PLANNED:  1. Cold  2. Electrical Stimulation  3. Heat  4. Home Exercise Program (HEP)  5. Manual Therapy  6. Neuromuscular Re-education/Strengthening  7. Range of Motion (ROM)  8. Therapeutic Exercise/Strengthening  9. Ultrasound (US)  10. Aquatic exercises   TREATMENT PLAN:  Effective Dates: 6/18/2018 TO 9/17/2018 (90 days). Frequency/Duration: 2 to 3 times a week for 90 Days  GOALS: (Goals have been discussed and agreed upon with patient.)  Short-Term Functional Goals: Time Frame: 4 weeks    1. Independent with HEP   2. trunk and B hip AROM to WNL to allow independent ADL's with less compensation   3. Strength deficits improved by at least 1/3 muscle grade to allow safe transfers with less compensation  Discharge Goals: Time Frame: 90 days    1. Tolerate gait > 30 minutes for community integration    2. ADL's and transfers with pain < 4/10    3. Tolerate sitting > 45 minutes for community integration    4. Pt to demonstrate good body mechanics  Rehabilitation Potential For Stated Goals: Good  Regarding Mickey Page's therapy, I certify that the treatment plan above will be carried out by a therapist or under their direction.   Thank you for this referral,  Sabine Romano, PT ,MSPT         Pt called to cancel due to having to go out of town                       n

## 2018-07-23 ENCOUNTER — HOSPITAL ENCOUNTER (OUTPATIENT)
Dept: PHYSICAL THERAPY | Age: 63
Discharge: HOME | End: 2018-07-23
Payer: OTHER GOVERNMENT

## 2018-07-23 PROCEDURE — 97035 APP MDLTY 1+ULTRASOUND EA 15: CPT

## 2018-07-23 PROCEDURE — 97140 MANUAL THERAPY 1/> REGIONS: CPT

## 2018-07-23 PROCEDURE — 97110 THERAPEUTIC EXERCISES: CPT

## 2018-07-23 NOTE — PROGRESS NOTES
Jose Aus  : 1955  Primary: [facility]  [facility]:  [address] [facility]  [address] [facility], [address], [place] [address].  [phone]   [phone]          OUTPATIENT PHYSICAL THERAPY:Daily Note 2018 6   ICD-10: Treatment Diagnosis: Low back pain (M54.5)  Precautions/Allergies:   Review of patient's allergies indicates no known allergies. Fall Risk Score: 3 (? 5 = High Risk)  MD Orders: PT- evaluate and treat, modalities, core strengthening and stretching MEDICAL/REFERRING DIAGNOSIS:  Spondylolisthesis, site unspecified [M43.10]  Other intervertebral disc degeneration, lumbar region [M51.36]  Spinal stenosis, lumbar region with neurogenic claudication [M48.062]  Other intervertebral disc displacement, lumbar region [M51.26]   DATE OF ONSET:  Initial injury . recurrence of symptoms  and increased severity 2017  REFERRING PHYSICIAN: Zita Light PA  RETURN PHYSICIAN APPOINTMENT: unsure     INITIAL ASSESSMENT:  Mr. Anuj Santos comes to therapy with complaints of sudden sharp low back pain with position changes and constant discomfort that progresses to pain and tingling to the right lateral calf. He reports decreasing tolerance to sitting, standing and now walking. He presents with core and bilateral hip weakness; very weak Quadratus lumborum muscles; significant bilateral hip soft tissue tightness and joint stiffness. Trunk ROM is limited by pain. Pt demonstrates very slow and deliberate compensatory movements during transfers. He will benefit from skilled therapy to address his deficits and assist in the return of functional gait, sitting and ADL's with less pain. PROBLEM LIST (Impacting functional limitations):  1. Decreased Strength  2. Decreased ADL/Functional Activities  3. Decreased Transfer Abilities  4. Decreased Ambulation Ability/Technique  5. Increased Pain  6. Decreased Activity Tolerance  7.  Decreased Flexibility/Joint Mobility INTERVENTIONS PLANNED:  1. Cold  2. Electrical Stimulation  3. Heat  4. Home Exercise Program (HEP)  5. Manual Therapy  6. Neuromuscular Re-education/Strengthening  7. Range of Motion (ROM)  8. Therapeutic Exercise/Strengthening  9. Ultrasound (US)  10. Aquatic exercises   TREATMENT PLAN:  Effective Dates: 6/18/2018 TO 9/17/2018 (90 days). Frequency/Duration: 2 to 3 times a week for 90 Days  GOALS: (Goals have been discussed and agreed upon with patient.)  Short-Term Functional Goals: Time Frame: 4 weeks    1. Independent with HEP   2. trunk and B hip AROM to WNL to allow independent ADL's with less compensation   3. Strength deficits improved by at least 1/3 muscle grade to allow safe transfers with less compensation  Discharge Goals: Time Frame: 90 days    1. Tolerate gait > 30 minutes for community integration    2. ADL's and transfers with pain < 4/10    3. Tolerate sitting > 45 minutes for community integration    4. Pt to demonstrate good body mechanics  Rehabilitation Potential For Stated Goals: Good  Regarding Mickey Page's therapy, I certify that the treatment plan above will be carried out by a therapist or under their direction. Thank you for this referral,  Sarah Narayanan, PT ,MSPT                  The information in this section was collected on 6/18/18 (except where otherwise noted). HISTORY:   History of Present Injury/Illness (Reason for Referral):  Pt reports hurting his back during  maneuver exercises where he was having to run over uneven ground carrying weapons. He went through trials of medication and rest with some relief. In 2017 he had the back pain return with the addition of right hip pain and leg tingling . Tried medication, rest and epidural without any long term relief. Past Medical History/Comorbidities:   Mr. Raiz Rasmussen  has a past medical history of Bilateral knee pain; low back   Social History/Living Environment:     lives with wife in two story home.   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 Previous Treatment Approaches:          No PT  Personal Factors:    Discouraged about inability to exercise or even walk with his wife  Current Medications:       Current Outpatient Prescriptions:     cholecalciferol (VITAMIN D3) 1,000 unit cap, Take 1,000 Units by mouth daily. , Disp: , Rfl:     simvastatin (ZOCOR) 20 mg tablet, Take 20 mg by mouth every morning., Disp: , Rfl:     amLODIPine (NORVASC) 10 mg tablet, Take 10 mg by mouth every morning., Disp: , Rfl:  * Aleve PRN   Date Last Reviewed:  7/23/18   Number of Personal Factors/Comorbidities that affect the Plan of Care: 1-2: MODERATE COMPLEXITY   EXAMINATION:   Observation/Orthostatic Postural Assessment:       Good posture. Palpation:          Tender over low back paraspinals. Stiff bilateral hip rotation with cramping of the right hamstring  ROM:   n/t                                            Strength: n/t                     Neurological Screen: at evaluation: No numbness or tingling at the moment,  positive quadrant with right trunk lean at evaluation  Functional Mobility:   Slow with extra time and compensation   Body Structures Involved:  1. Nerves  2. Bones  3. Joints  4. Muscles  5. Ligaments Body Functions Affected:  1. Sensory/Pain  2. Neuromusculoskeletal  3. Movement Related Activities and Participation Affected:  1. General Tasks and Demands  2. Mobility  3. Domestic Life  4. Community, Social and [place]   Number of elements (examined above) that affect the Plan of Care: 3: MODERATE COMPLEXITY   CLINICAL PRESENTATION:   Presentation: Evolving clinical presentation with changing clinical characteristics: MODERATE COMPLEXITY   CLINICAL DECISION MAKING:   Outcome Measure:    Tool Used: Modified Oswestry Low Back Pain Questionnaire  Score:  Initial: 18/50  Most Recent: X/50 (Date: -- )   Interpretation of Score: Each section is scored on a 0-5 scale, 5 representing the greatest disability. The scores of each section are added together for a total score of 50. Score 0 1-10 11-20 21-30 31-40 41-49 50   Modifier CH CI CJ CK CL CM CN     Medical Necessity:   · Skilled intervention continues to be required due to need for manual treatment and modalities. Reason for Services/Other Comments:  · Patient continues to require skilled intervention due to need for guided progression into and through exercise program.   Use of outcome tool(s) and clinical judgement create a POC that gives a: Questionable prediction of patient's progress: MODERATE COMPLEXITY            TREATMENT:   (In addition to Assessment/Re-Assessment sessions the following treatments were rendered)  Pre-treatment Symptoms/Complaints:  Pt reports back feels sore and not sure why. hips are ok, therapy is helping alot. Pain: Initial:     4-5 /10  low back. R hip is 2/10  Post Session: \"its ok, feels better\" 2-3/10     THERAPEUTIC EXERCISE: ( 20  minutes):  Exercises to improve mobility and strength. Required moderate verbal cues to promote proper body alignment, promote proper body posture, max cueing for proper exhalation technique withcore activation during exercise.     date Date:  6/27/18 Date:  7/10/18 Date:  7/11/18  7/17/18 7/23/15   Activity/Exercise Parameters Parameters Parameters     Alternating isometrics At LE over Tball while supine At LE over tball with core      LTR Short arc on tball x 10 Short arc x20 with core Short arc x 10 over green tball Short arc 1# B ankles x20 Short arc over green tball 1.5# B ankles x20   bridging Short arc over tball 2x8 Short arc over tball x20 Short arc over green tball x10  Short arc over green tball 2x12   Pelvic tilt Short arc LE over tball 2x8 Over tball x20 voer green tball x10 Hook lying x20 Over green tball 2x10   Supine abdom press with exhalation LE on tball 2x5 LE over tball x10 LE over green tball x10 With exhalation LE over green tball x 24 Over green tball 2x12   Core activation  Prone x10 Prone x 10  Prone x 10   Hip IR  Red tband 2x10 prone with core  No due to hamstring cramps    Modalities:(9 minutes): continuous ultrasound to low back at 2.0 w/cm2 for mechanical effects on tissue after exercises    MANUAL THERAPY: ( 15 minutes): prone B hip flexor stretch; grade 4-- to 4 physiological mobilizations B hip external rotation    MedBridge Portal  Treatment/Session Assessment:    · Response to Treatment: Stiff R hip today but responded well to stretching. No right hamstring cramping  · Compliance with Program/Exercises: some,per pt  · Recommendations/Intent for next treatment session: \"Next visit will focus on Manual treatment and exercise modification as needed for core.   ·   Total Treatment Duration:   44    minutes  PT Patient Time In/Time Out  Time In: 0945  Time Out: JOSSY Pedraza

## 2018-07-25 ENCOUNTER — HOSPITAL ENCOUNTER (OUTPATIENT)
Dept: PHYSICAL THERAPY | Age: 63
Discharge: HOME | End: 2018-07-25
Payer: OTHER GOVERNMENT

## 2018-07-25 NOTE — PROGRESS NOTES
Madison Tim  : 1955  Primary: [facility]  Secondary:  [address] [facility], [address], [place] [address].  [phone]   [phone]          OUTPATIENT PHYSICAL THERAPY:cancellation 2018 7   ICD-10: Treatment Diagnosis: Low back pain (M54.5)  Precautions/Allergies:   Review of patient's allergies indicates no known allergies. Fall Risk Score: 3 (? 5 = High Risk)  MD Orders: PT- evaluate and treat, modalities, core strengthening and stretching MEDICAL/REFERRING DIAGNOSIS:  Spondylolisthesis, site unspecified [M43.10]  Other intervertebral disc degeneration, lumbar region [M51.36]  Spinal stenosis, lumbar region with neurogenic claudication [M48.062]  Other intervertebral disc displacement, lumbar region [M51.26]   DATE OF ONSET:  Initial injury . recurrence of symptoms  and increased severity   REFERRING PHYSICIAN: Zita Light PA  RETURN PHYSICIAN APPOINTMENT: 7/10/18     INITIAL ASSESSMENT:  Mr. Jesse Yao comes to therapy with complaints of sudden sharp low back pain with position changes and constant discomfort that progresses to pain and tingling to the right lateral calf. He reports decreasing tolerance to sitting, standing and now walking. He presents with core and bilateral hip weakness; very weak Quadratus lumborum muscles; significant bilateral hip soft tissue tightness and joint stiffness. Trunk ROM is limited by pain. Pt demonstrates very slow and deliberate compensatory movements during transfers. He will benefit from skilled therapy to address his deficits and assist in the return of functional gait, sitting and ADL's with less pain. PROBLEM LIST (Impacting functional limitations):  1. Decreased Strength  2. Decreased ADL/Functional Activities  3. Decreased Transfer Abilities  4. Decreased Ambulation Ability/Technique  5. Increased Pain  6. Decreased Activity Tolerance  7.  Decreased Flexibility/Joint Mobility INTERVENTIONS PLANNED:  1. Cold  2. Electrical Stimulation  3. Heat  4. Home Exercise Program (HEP)  5. Manual Therapy  6. Neuromuscular Re-education/Strengthening  7. Range of Motion (ROM)  8. Therapeutic Exercise/Strengthening  9. Ultrasound (US)  10. Aquatic exercises   TREATMENT PLAN:  Effective Dates: 6/18/2018 TO 9/17/2018 (90 days). Frequency/Duration: 2 to 3 times a week for 90 Days  GOALS: (Goals have been discussed and agreed upon with patient.)  Short-Term Functional Goals: Time Frame: 4 weeks    1. Independent with HEP   2. trunk and B hip AROM to WNL to allow independent ADL's with less compensation   3. Strength deficits improved by at least 1/3 muscle grade to allow safe transfers with less compensation  Discharge Goals: Time Frame: 90 days    1. Tolerate gait > 30 minutes for community integration    2. ADL's and transfers with pain < 4/10    3. Tolerate sitting > 45 minutes for community integration    4. Pt to demonstrate good body mechanics  Rehabilitation Potential For Stated Goals: Good  Regarding Mickey Page's therapy, I certify that the treatment plan above will be carried out by a therapist or under their direction.   Thank you for this referral,  Quin Robert, PT ,MSPT         Pt called to cancel appointment

## 2018-07-30 ENCOUNTER — HOSPITAL ENCOUNTER (OUTPATIENT)
Dept: PHYSICAL THERAPY | Age: 63
Discharge: HOME | End: 2018-07-30
Payer: OTHER GOVERNMENT

## 2018-07-30 PROCEDURE — 97110 THERAPEUTIC EXERCISES: CPT

## 2018-07-30 NOTE — PROGRESS NOTES
Nesha Ortega  : 1955  Primary: [facility]  Secondary:  [address] at [facility] FIONA CULLEN  [facility], [address] [place] [address],[address], [place] [address].  [phone]   [phone]          OUTPATIENT PHYSICAL THERAPY:Daily Note 2018 7   ICD-10: Treatment Diagnosis: Low back pain (M54.5)  Precautions/Allergies:   Review of patient's allergies indicates no known allergies. Fall Risk Score: 3 (? 5 = High Risk)  MD Orders: PT- evaluate and treat, modalities, core strengthening and stretching MEDICAL/REFERRING DIAGNOSIS:  Spondylolisthesis, site unspecified [M43.10]  Other intervertebral disc degeneration, lumbar region [M51.36]  Spinal stenosis, lumbar region with neurogenic claudication [M48.062]  Other intervertebral disc displacement, lumbar region [M51.26]   DATE OF ONSET:  Initial injury . recurrence of symptoms  and increased severity   REFERRING PHYSICIAN: Zita Light PA  RETURN PHYSICIAN APPOINTMENT: unsure     INITIAL ASSESSMENT:  Mr. Dhruv Farah comes to therapy with complaints of sudden sharp low back pain with position changes and constant discomfort that progresses to pain and tingling to the right lateral calf. He reports decreasing tolerance to sitting, standing and now walking. He presents with core and bilateral hip weakness; very weak Quadratus lumborum muscles; significant bilateral hip soft tissue tightness and joint stiffness. Trunk ROM is limited by pain. Pt demonstrates very slow and deliberate compensatory movements during transfers. He will benefit from skilled therapy to address his deficits and assist in the return of functional gait, sitting and ADL's with less pain. PROBLEM LIST (Impacting functional limitations):  1. Decreased Strength  2. Decreased ADL/Functional Activities  3. Decreased Transfer Abilities  4. Decreased Ambulation Ability/Technique  5. Increased Pain  6. Decreased Activity Tolerance  7.  Decreased Flexibility/Joint Mobility INTERVENTIONS PLANNED:  1. Cold  2. Electrical Stimulation  3. Heat  4. Home Exercise Program (HEP)  5. Manual Therapy  6. Neuromuscular Re-education/Strengthening  7. Range of Motion (ROM)  8. Therapeutic Exercise/Strengthening  9. Ultrasound (US)  10. Aquatic exercises   TREATMENT PLAN:  Effective Dates: 6/18/2018 TO 9/17/2018 (90 days). Frequency/Duration: 2 to 3 times a week for 90 Days  GOALS: (Goals have been discussed and agreed upon with patient.)  Short-Term Functional Goals: Time Frame: 4 weeks    1. Independent with HEP   2. trunk and B hip AROM to WNL to allow independent ADL's with less compensation   3. Strength deficits improved by at least 1/3 muscle grade to allow safe transfers with less compensation  Discharge Goals: Time Frame: 90 days    1. Tolerate gait > 30 minutes for community integration    2. ADL's and transfers with pain < 4/10    3. Tolerate sitting > 45 minutes for community integration    4. Pt to demonstrate good body mechanics  Rehabilitation Potential For Stated Goals: Good  Regarding Mickey Page's therapy, I certify that the treatment plan above will be carried out by a therapist or under their direction. Thank you for this referral,  Emmy Frankel, PT ,MSPT                  The information in this section was collected on 6/18/18 (except where otherwise noted). HISTORY:   History of Present Injury/Illness (Reason for Referral):  Pt reports hurting his back during  maneuver exercises where he was having to run over uneven ground carrying weapons. He went through trials of medication and rest with some relief. In 2017 he had the back pain return with the addition of right hip pain and leg tingling . Tried medication, rest and epidural without any long term relief. Past Medical History/Comorbidities:   Mr. Annmarie Martínez  has a past medical history of Bilateral knee pain; low back   Social History/Living Environment:     lives with wife in two story home.   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 Previous Treatment Approaches:          No PT  Personal Factors:    Discouraged about inability to exercise or even walk with his wife  Current Medications:       Current Outpatient Prescriptions:     cholecalciferol (VITAMIN D3) 1,000 unit cap, Take 1,000 Units by mouth daily. , Disp: , Rfl:     simvastatin (ZOCOR) 20 mg tablet, Take 20 mg by mouth every morning., Disp: , Rfl:     amLODIPine (NORVASC) 10 mg tablet, Take 10 mg by mouth every morning., Disp: , Rfl:  * Aleve PRN   Date Last Reviewed:  7/30/18   Number of Personal Factors/Comorbidities that affect the Plan of Care: 1-2: MODERATE COMPLEXITY   EXAMINATION:   Observation/Orthostatic Postural Assessment:         Palpation:          Tender over low back paraspinals. Stiff bilateral hip rotation with cramping of the right hamstring  ROM:   n/t                                            Strength: n/t                     Neurological Screen: at evaluation: No numbness or tingling at the moment,  positive quadrant with right trunk lean at evaluation  Functional Mobility:   Slow with extra time and compensation   Body Structures Involved:  1. Nerves  2. Bones  3. Joints  4. Muscles  5. Ligaments Body Functions Affected:  1. Sensory/Pain  2. Neuromusculoskeletal  3. Movement Related Activities and Participation Affected:  1. General Tasks and Demands  2. Mobility  3. Domestic Life  4. Community, Social and Milmine Osakis   Number of elements (examined above) that affect the Plan of Care: 3: MODERATE COMPLEXITY   CLINICAL PRESENTATION:   Presentation: Evolving clinical presentation with changing clinical characteristics: MODERATE COMPLEXITY   CLINICAL DECISION MAKING:   Outcome Measure:    Tool Used: Modified Oswestry Low Back Pain Questionnaire  Score:  Initial: 18/50  Most Recent: X/50 (Date: -- )   Interpretation of Score: Each section is scored on a 0-5 scale, 5 representing the greatest disability. The scores of each section are added together for a total score of 50. Score 0 1-10 11-20 21-30 31-40 41-49 50   Modifier CH CI CJ CK CL CM CN     Medical Necessity:   · Skilled intervention continues to be required due to need for manual treatment and modalities. Reason for Services/Other Comments:  · Patient continues to require skilled intervention due to need for guided progression into and through exercise program.   Use of outcome tool(s) and clinical judgement create a POC that gives a: Questionable prediction of patient's progress: MODERATE COMPLEXITY            TREATMENT:   (In addition to Assessment/Re-Assessment sessions the following treatments were rendered)  Pre-treatment Symptoms/Complaints:  Pt reports back is sore from driving out of town this weekend. Pain: Initial:     4-5 /10  low back. R hip is 5/10  Post Session:  2-3/10 in back and hip     THERAPEUTIC EXERCISE: ( 39  minutes):  Exercises to improve mobility and strength. Required moderate verbal cues to promote proper body alignment, promote proper body posture, max cueing for proper exhalation technique withcore activation during exercise.   Sidelying ASI hip flexor and abductor stretch  date Date:  7/11/18  7/17/18 7/23/15 7/30/18    Activity/Exercise Parameters       Alternating isometrics    At LE over green tball 2x30 sec    LTR Short arc x 10 over green tball Short arc 1# B ankles x20 Short arc over green tball 1.5# B ankles x20 Over green tball 1# B ankles x 30 in pain free motion    bridging Short arc over green tball x10  Short arc over green tball 2x12 Over green tball x 30 in pain free motion    Pelvic tilt voer green tball x10 Hook lying x20 Over green tball 2x10 Over green tball x 30    Supine abdom press with exhalation LE over green tball x10 With exhalation LE over green tball x 24 Over green tball 2x12 Over green tball 2x15     Core activation Prone x 10  Prone x 10 Supine with exhalation    Hip IR  No due to hamstring cramps  Seated with yellow tband 2x12    Supine Ball roll    BLE 2x10    Hip abd    R 2x5 in pain free range    Modalities:( minutes): none    MANUAL THERAPY: ( minutes): none    [facility]  Treatment/Session Assessment:    · Response to Treatment: R hip responded well to stretching. more emphasis on strengthening today  · Compliance with Program/Exercises: some,per pt  · Recommendations/Intent for next treatment session: \"Next visit will focus on Manual treatment and exercise modification as needed for core.   ·   Total Treatment Duration:  39     minutes  PT Patient Time In/Time Out  Time In: 5485  Time Out: [address], PT

## 2018-08-03 ENCOUNTER — APPOINTMENT (OUTPATIENT)
Dept: PHYSICAL THERAPY | Age: 63
End: 2018-08-03
Payer: OTHER GOVERNMENT

## 2018-08-07 ENCOUNTER — APPOINTMENT (OUTPATIENT)
Dept: RADIATION ONCOLOGY | Age: 63
End: 2018-08-07

## 2018-08-10 ENCOUNTER — APPOINTMENT (OUTPATIENT)
Dept: PHYSICAL THERAPY | Age: 63
End: 2018-08-10
Payer: OTHER GOVERNMENT

## 2018-08-20 ENCOUNTER — HOSPITAL ENCOUNTER (OUTPATIENT)
Dept: RADIATION ONCOLOGY | Age: 63
Discharge: HOME | End: 2018-08-20
Payer: OTHER GOVERNMENT

## 2018-08-20 DIAGNOSIS — C61 PROSTATE CANCER (HCC): ICD-10-CM

## 2018-08-20 LAB — PSA SERPL-MCNC: 0.2 NG/ML

## 2018-08-20 PROCEDURE — 84153 ASSAY OF PSA TOTAL: CPT

## 2018-08-20 PROCEDURE — 36415 COLL VENOUS BLD VENIPUNCTURE: CPT

## 2018-08-20 PROCEDURE — 84403 ASSAY OF TOTAL TESTOSTERONE: CPT

## 2018-08-21 LAB — TESTOST SERPL-MCNC: 215 NG/DL (ref 264–916)

## 2018-08-27 ENCOUNTER — HOSPITAL ENCOUNTER (OUTPATIENT)
Dept: PHYSICAL THERAPY | Age: 63
Discharge: HOME | End: 2018-08-27
Payer: OTHER GOVERNMENT

## 2018-08-28 ENCOUNTER — HOSPITAL ENCOUNTER (OUTPATIENT)
Dept: RADIATION ONCOLOGY | Age: 63
Discharge: HOME | End: 2018-08-28
Payer: OTHER GOVERNMENT

## 2018-08-28 VITALS
TEMPERATURE: 98.7 F | RESPIRATION RATE: 16 BRPM | SYSTOLIC BLOOD PRESSURE: 145 MMHG | WEIGHT: 256.1 LBS | HEART RATE: 70 BPM | DIASTOLIC BLOOD PRESSURE: 90 MMHG | BODY MASS INDEX: 34.69 KG/M2 | HEIGHT: 72 IN | OXYGEN SATURATION: 98 %

## 2018-08-28 DIAGNOSIS — C61 PROSTATE CANCER (HCC): Primary | ICD-10-CM

## 2018-08-28 PROCEDURE — 99211 OFF/OP EST MAY X REQ PHY/QHP: CPT

## 2018-08-28 NOTE — NURSE NAVIGATOR
F/u Prostate Cancer. Aua/Epic completed. Aua score 4. Labs 8-20-18. Short term ADT. 4/16. S/p Space Oar. HDR 8/16. RT end 10/16. F/u Dr. Mari Kessler 11-7-18.  
 
Josafat Guzman RN

## 2018-08-28 NOTE — PROGRESS NOTES
Karol Weller MRN: [id number]  SSN: [id number] YOB: 1955  Age: 59 y.o. Sex: male DIAGNOSIS:  Adenocarcinoma of the prostate, T2a, Roxanna score 4+4 = 8, PSA 4.49 
 
TREATMENT SITE AND DOSE DELIVERED:   
1)  He received HDR brachytherapy under the direction of Dr. Malcom Turner in [facility] on 08/15/16. He also had SpaceOAR hydrogel placed on 08/17/16. Pelvis has received 37.8 Gy of 45 Gy in 25/25 fractions that he completed 10/10/16. HISTORY OF PRESENT ILLNESS: Tova Parra is a 62year old male initially seen by Dr. Monhco Paul on 03/16/16 at the request of Dr. Ilir Scott for further discussion of management options for this high risk prostate cancer. He was found to have an elevated PSA of 4.49 by his primary care physician. He was then referred to Dr. Ilir Scott for further evaluation. Dr. Ilir Scott palpated a 1 cm nodule at the right apex. He has a family history of prostate cancer in his father. Prostate biopsy on 01/04/16 demonstrated a 36.17 g prostate for a PSA density of 0.12. Pathology showed [place] score 4+4 = 8 disease in 25% of the tissue from the left lateral base. 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 High-grade PIN was also seen in tissue from the right lateral base. No other biopsy cores contained cancer. MRI of the pelvis on 02/02/16 showed significant heterogeneity throughout the peripheral zone of the prostate. The most convincing focus of T2 signal loss was seen at the left posterior base where a nodule measuring 9 mm was seen. Abnormal signal change was seen in the bilateral seminal vesicles which was felt likely to represent hemorrhage from biopsy. No extracapsular disease was identified. No pelvic lymphadenopathy or other metastatic disease was seen in the pelvis or visualized abdomen. Bone scan on 02/02/16 showed no evidence of bony metastatic disease.  He had a thorough discussion with Dr. Ilir Scott regarding various treatment options. He was interested in further discussion of radiation therapy. 
  
INTERVAL HISTORY:  Steve Chester is a 59 y.o. male now 22 months following his radiation treatment for his high risk prostate cancer. He received a single dose of Eligard on 4/7/16 and completed 6 months of casodex on 10/10/16. He has an AUA score of 4/35 and is \"pleased\" with his urinary function. He report his urinary function is back to his baseline. He continues with nocturia x1. Otherwise denies frequency, urgency, leakage, dysuria or hematuria. He has good force of stream. He is no longer on Flomax and doesn't notice a difference with his urinary function. He has good bowel function with occasional constipation. Energy is great. Feels well. He retired 11/1/16 and has opened up a POW with his son. He continues with ED. He has tried Viagra and Cialis in the past with no results. He was then prescribed trimix and states \"only last a minute. \" GENERAL: The patient is well-developed, ambulatory, alert and in no acute distress. LABS:  
08/20/18: PSA 0.2 with testosterone of 215 
02/20/18: PSA 0.5 with testosterone of 275 
08/15/17: PSA 0.3 with testosterone of 269 
05/16/17: PSA 0.4 with testosterone of 161 
02/07/17: PSA 0.3 with testosterone of 247 
11/11/16: PSA <0.1 with testosterone of <3 
 
ASSESSMENT:  Steve Chester with Adenocarcinoma of the prostate, T2a, Roxanna score 4+4 = 8, PSA 4.49 He received HDR brachytherapy under the direction of Dr. Osiel Mcclain in [place] on 08/15/16 then radiation, completed 10/10/16. He is now 22 months after completing radiation therapy. He is recovering as anticipated from radiation and ADT therapy. He is concerned regarding his ED. He has tried Cialis and Viagra with no results and recently trimix, also with no improvement. He states that he is going to retry the Viagra and if no result, will discuss with Dr. Carmelita Luke at his next visit.   
 
PLAN: 
 1) Scheduled with Dr. Becca Zabala in 3 months. I will see him again in 6 months with PSA/Testosterone prior to visit. 2) We discussed ongoing surveillance for his prostate cancer, which would include follow up every 3 months with  PSA/Testosterone x2 years then every 6 months generally. All questions were answered to the best of my ability. Saint Hatcher, NP August 28, 2018 Portions of this note were copied from prior encounters and reviewed for accuracy, currency, and represent documentation and tasks completed during this encounter. I verify and attest these portions to be unchanged from prior visits.

## 2018-08-29 ENCOUNTER — HOSPITAL ENCOUNTER (OUTPATIENT)
Dept: PHYSICAL THERAPY | Age: 63
Discharge: HOME | End: 2018-08-29
Payer: OTHER GOVERNMENT

## 2018-08-29 PROCEDURE — 97140 MANUAL THERAPY 1/> REGIONS: CPT

## 2018-08-29 PROCEDURE — 97110 THERAPEUTIC EXERCISES: CPT

## 2018-08-29 PROCEDURE — 97035 APP MDLTY 1+ULTRASOUND EA 15: CPT

## 2018-08-29 NOTE — PROGRESS NOTES
Radha Sim  : 1955  Primary: [facility]  Secondary:  [address] at [facility] FIONA CULLEN  1101 [facility], [address] [place], [place] [address]way [address], 2115 [facility]  [phone]   [phone]          OUTPATIENT PHYSICAL THERAPY:Daily Note and Progress Report 2018 8   ICD-10: Treatment Diagnosis: Low back pain (M54.5)  Precautions/Allergies:   Review of patient's allergies indicates no known allergies. Fall Risk Score: 3 (? 5 = High Risk)  MD Orders: PT- evaluate and treat, modalities, core strengthening and stretching MEDICAL/REFERRING DIAGNOSIS:  Spondylolisthesis, site unspecified [M43.10]  Other intervertebral disc degeneration, lumbar region [M51.36]  Spinal stenosis, lumbar region with neurogenic claudication [M48.062]  Other intervertebral disc displacement, lumbar region [M51.26]   DATE OF ONSET:  Initial injury . recurrence of symptoms  and increased severity   REFERRING PHYSICIAN: Zita Light PA  RETURN PHYSICIAN APPOINTMENT: unsure      ASSESSMENT:  Mr. Denys Fonseca came to therapy with complaints of sudden sharp low back pain with position changes and constant discomfort that progresses to pain and tingling to the right lateral calf. He reports improved tolerance to sitting with less calf pain. Standing for long periods of time causes low back pain. Walking is not as painful. He presents with continued core and bilateral hip weakness; very weak Quadratus lumborum muscles; significant bilateral hip joint stiffness with the right more so than the left. Trunk AROM is now more limited by stiffness to the left side and pain into extension. Pt demonstrates deliberate but more fluent movements during transfers. He needs to be able to kneel, squat and get in/out of the floor for his job as an . He will benefit from continued skilled therapy to address his deficits and assist in the return of functional gait, sitting and ADL's with less pain.     PROBLEM LIST (Impacting functional limitations):  1. Decreased Strength  2. Decreased ADL/Functional Activities  3. Decreased Transfer Abilities  4. Decreased Ambulation Ability/Technique  5. Increased Pain  6. Decreased Activity Tolerance  7. Decreased Flexibility/Joint Mobility INTERVENTIONS PLANNED:  1. Cold  2. Electrical Stimulation  3. Heat  4. Home Exercise Program (HEP)  5. Manual Therapy  6. Neuromuscular Re-education/Strengthening  7. Range of Motion (ROM)  8. Therapeutic Exercise/Strengthening  9. Ultrasound (US)  10. Aquatic exercises   TREATMENT PLAN:  Effective Dates: 6/18/2018 TO 9/17/2018 (90 days). Frequency/Duration: 2 to 3 times a week for 90 Days  GOALS: (Goals have been discussed and agreed upon with patient.)  Short-Term Functional Goals: Time Frame: 4 weeks    1. Independent with HEP- met   2. trunk and B hip AROM to WNL to allow independent ADL's with less compensation- not met for left side bend   3. Strength deficits improved by at least 1/3 muscle grade to allow safe transfers with less compensation- not fully met  Discharge Goals: Time Frame: 90 days    1. Tolerate gait > 30 minutes for community integration- met    2. ADL's and transfers with pain < 4/10- not consistently met    3. Tolerate sitting > 45 minutes for community integration- met with pain    4. Pt to demonstrate good body mechanics- not fully met  Rehabilitation Potential For Stated Goals: Good                      The information in this section was collected on 6/18/18 (except where otherwise noted). HISTORY:   History of Present Injury/Illness (Reason for Referral):  Pt reports hurting his back during  maneuver exercises where he was having to run over uneven ground carrying weapons. He went through trials of medication and rest with some relief. In 2017 he had the back pain return with the addition of right hip pain and leg tingling . Tried medication, rest and epidural without any long term relief.   Past Medical History/Comorbidities:   Mr. Hayder Heredia  has a past medical history of Bilateral knee pain; low back   Social History/Living Environment:     lives with wife in two story home. 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 Previous Treatment Approaches:          No PT  Personal Factors:    Discouraged about inability to exercise or even walk with his wife  Current Medications:       Current Outpatient Prescriptions:     cholecalciferol (VITAMIN D3) 1,000 unit cap, Take 1,000 Units by mouth daily. , Disp: , Rfl:     simvastatin (ZOCOR) 20 mg tablet, Take 20 mg by mouth every morning., Disp: , Rfl:     amLODIPine (NORVASC) 10 mg tablet, Take 10 mg by mouth every morning., Disp: , Rfl:  * Aleve PRN   Date Last Reviewed:  8/29/18   Number of Personal Factors/Comorbidities that affect the Plan of Care: 1-2: MODERATE COMPLEXITY   EXAMINATION:   Observation/Orthostatic Postural Assessment:       Excessive pelvic movement with attempts to externally rotate both hips, over use of back for bending and squatting  Palpation:          Tender over L5-5. Stiff bilateral hip rotation, especially the right  ROM:                     LLE AROM  L Hip Extension: 12  L Hip External Rotation: 30    RLE AROM  R Hip Extension: 15 (after treatment)  R Hip External Rotation: 35 (after treatment)                    Strength:           LLE Strength  L Hip Extension: 4-  L Hip External Rotation: 4    RLE Strength  R Hip Extension: 4 (after treatment)          Neurological Screen: at evaluation: No numbness or tingling at the moment,  positive quadrant with right trunk lean at evaluation  Functional Mobility:   Slow with extra time and compensation   Body Structures Involved:  1. Nerves  2. Bones  3. Joints  4. Muscles  5. Ligaments Body Functions Affected:  1. Sensory/Pain  2. Neuromusculoskeletal  3. Movement Related Activities and Participation Affected:  1.  General Tasks and Demands  2. Mobility  3. Domestic Life  4. Community, Social and [place]   Number of elements (examined above) that affect the Plan of Care: 3: MODERATE COMPLEXITY   CLINICAL PRESENTATION:   Presentation: Evolving clinical presentation with changing clinical characteristics: MODERATE COMPLEXITY   CLINICAL DECISION MAKING:   Outcome Measure: Tool Used: Modified Oswestry Low Back Pain Questionnaire  Score:  Initial: 18/50  Most Recent: 15/50 (Date:8/29/18 )   Interpretation of Score: Each section is scored on a 0-5 scale, 5 representing the greatest disability. The scores of each section are added together for a total score of 50. Score 0 1-10 11-20 21-30 31-40 41-49 50   Modifier CH CI CJ CK CL CM CN     Medical Necessity:   · Skilled intervention continues to be required due to need for manual treatment and modalities. Reason for Services/Other Comments:  · Patient continues to require skilled intervention due to need for guided progression into and through exercise program.   Use of outcome tool(s) and clinical judgement create a POC that gives a: Questionable prediction of patient's progress: MODERATE COMPLEXITY            TREATMENT:   (In addition to Assessment/Re-Assessment sessions the following treatments were rendered)  Pre-treatment Symptoms/Complaints:  Pt reports back is stiff bending to the left. Less pain. Pain: Initial:     4-8 /10  low back. R hip is 0-5/10  Post Session:  3/10 in back and 2-4 right hip     THERAPEUTIC EXERCISE: ( 42  minutes):  Exercises to improve mobility and strength. Required moderate verbal cues for proper exhalation technique with core activation during exercise. Reviewed LTR and instructed pt on one leg bridging to work on hip strength.  AIS B hip rotation stretching and prone quad stretching   date Date:  7/11/18  7/17/18 7/23/15 7/30/18 8/29/18   Activity/Exercise Parameters       Alternating isometrics    At LE over green tball 2x30 sec At LE   LTR Short arc x 10 over green tball Short arc 1# B ankles x20 Short arc over green tball 1.5# B ankles x20 Over green tball 1# B ankles x 30 in pain free motion    bridging Short arc over green tball x10  Short arc over green tball 2x12 Over green tball x 30 in pain free motion hooklying with overpressure stretch   Pelvic tilt voer green tball x10 Hook lying x20 Over green tball 2x10 Over green tball x 30 hooklying with core   Supine abdom press with exhalation LE over green tball x10 With exhalation LE over green tball x 24 Over green tball 2x12 Over green tball 2x15     Core activation Prone x 10  Prone x 10 Supine with exhalation With all exercises   Hip IR  No due to hamstring cramps  Seated with yellow tband 2x12 Prone B 2x10   Supine Ball roll    BLE 2x10    Hip abd    R 2x5 in pain free range    Hip extn     Prone B alternating                   Modalities:(  minutes): MANUAL THERAPY: ( minutes):     "[employer]" Portal  Treatment/Session Assessment:    · Response to Treatment: R hip very stiff and pain. Needs to improve hip and core strength  · Compliance with Program/Exercises: some,per pt  · Recommendations/Intent for next treatment session: \"Next visit will focus on exercise modification as tolerated.   ·   Total Treatment Duration:  42     minutes  PT Patient Time In/Time Out  Time In: 1350  Time Out: [address], PT

## 2018-09-04 ENCOUNTER — HOSPITAL ENCOUNTER (OUTPATIENT)
Dept: PHYSICAL THERAPY | Age: 63
Discharge: HOME | End: 2018-09-04

## 2018-09-04 NOTE — PROGRESS NOTES
Fouzia Barriga  : 1955  Primary: [facility]  Secondary:  [address] at [facility] FIONA CULLEN  [facility], [address] [place], [place] [address]way [address], [address] [facility]  [phone]   [phone]          OUTPATIENT PHYSICAL THERAPY:cancellation 2018 9   ICD-10: Treatment Diagnosis: Low back pain (M54.5)  Precautions/Allergies:   Review of patient's allergies indicates no known allergies. Fall Risk Score: 3 (? 5 = High Risk)  MD Orders: PT- evaluate and treat, modalities, core strengthening and stretching MEDICAL/REFERRING DIAGNOSIS:  Spondylolisthesis, site unspecified [M43.10]  Other intervertebral disc degeneration, lumbar region [M51.36]  Spinal stenosis, lumbar region with neurogenic claudication [M48.062]  Other intervertebral disc displacement, lumbar region [M51.26]   DATE OF ONSET:  Initial injury . recurrence of symptoms  and increased severity   REFERRING PHYSICIAN: Zita Light PA  RETURN PHYSICIAN APPOINTMENT: 7/10/18     INITIAL ASSESSMENT:  Mr. Calos Alvarez comes to therapy with complaints of sudden sharp low back pain with position changes and constant discomfort that progresses to pain and tingling to the right lateral calf. He reports decreasing tolerance to sitting, standing and now walking. He presents with core and bilateral hip weakness; very weak Quadratus lumborum muscles; significant bilateral hip soft tissue tightness and joint stiffness. Trunk ROM is limited by pain. Pt demonstrates very slow and deliberate compensatory movements during transfers. He will benefit from skilled therapy to address his deficits and assist in the return of functional gait, sitting and ADL's with less pain. PROBLEM LIST (Impacting functional limitations):  1. Decreased Strength  2. Decreased ADL/Functional Activities  3. Decreased Transfer Abilities  4. Decreased Ambulation Ability/Technique  5. Increased Pain  6. Decreased Activity Tolerance  7.  Decreased Flexibility/Joint Mobility INTERVENTIONS PLANNED:  1. Cold  2. Electrical Stimulation  3. Heat  4. Home Exercise Program (HEP)  5. Manual Therapy  6. Neuromuscular Re-education/Strengthening  7. Range of Motion (ROM)  8. Therapeutic Exercise/Strengthening  9. Ultrasound (US)  10. Aquatic exercises   TREATMENT PLAN:  Effective Dates: 6/18/2018 TO 9/17/2018 (90 days). Frequency/Duration: 2 to 3 times a week for 90 Days  GOALS: (Goals have been discussed and agreed upon with patient.)  Short-Term Functional Goals: Time Frame: 4 weeks    1. Independent with HEP   2. trunk and B hip AROM to WNL to allow independent ADL's with less compensation   3. Strength deficits improved by at least 1/3 muscle grade to allow safe transfers with less compensation  Discharge Goals: Time Frame: 90 days    1. Tolerate gait > 30 minutes for community integration    2. ADL's and transfers with pain < 4/10    3. Tolerate sitting > 45 minutes for community integration    4. Pt to demonstrate good body mechanics  Rehabilitation Potential For Stated Goals: Good  Regarding Mickey Page's therapy, I certify that the treatment plan above will be carried out by a therapist or under their direction. Thank you for this referral,  Tony Rizzo, PT ,MSPT         Pt cancelled  Appointment due to work.  Will try to reschedule

## 2018-09-07 ENCOUNTER — APPOINTMENT (OUTPATIENT)
Dept: PHYSICAL THERAPY | Age: 63
End: 2018-09-07

## 2018-10-12 NOTE — PROGRESS NOTES
I am accessing Mr. Paulino Bonilla chart as a part of our department's internal chart auditing process. I certify that Mr. Zehra Lyman is, or was, a patient in our department.     Thank you,    Leslie Ibrahim, PT  10/12/2018

## 2019-01-15 ENCOUNTER — HOSPITAL ENCOUNTER (OUTPATIENT)
Dept: MRI IMAGING | Age: 64
Discharge: HOME | End: 2019-01-15
Attending: INTERNAL MEDICINE
Payer: OTHER GOVERNMENT

## 2019-01-15 DIAGNOSIS — M25.571 PAIN IN RIGHT ANKLE AND JOINTS OF RIGHT FOOT: ICD-10-CM

## 2019-01-15 PROCEDURE — 73721 MRI JNT OF LWR EXTRE W/O DYE: CPT

## 2019-02-27 ENCOUNTER — HOSPITAL ENCOUNTER (OUTPATIENT)
Dept: RADIATION ONCOLOGY | Age: 64
Discharge: HOME | End: 2019-02-27
Payer: OTHER GOVERNMENT

## 2019-02-27 DIAGNOSIS — C61 PROSTATE CANCER (HCC): ICD-10-CM

## 2019-02-27 PROCEDURE — 84403 ASSAY OF TOTAL TESTOSTERONE: CPT

## 2019-02-27 PROCEDURE — 36415 COLL VENOUS BLD VENIPUNCTURE: CPT

## 2019-02-27 PROCEDURE — 84153 ASSAY OF PSA TOTAL: CPT

## 2019-02-28 LAB — TESTOST SERPL-MCNC: 246 NG/DL (ref 264–916)

## 2019-03-04 ENCOUNTER — HOSPITAL ENCOUNTER (OUTPATIENT)
Dept: LAB | Age: 64
Discharge: HOME | End: 2019-03-04

## 2019-03-04 DIAGNOSIS — C61 PROSTATE CANCER (HCC): ICD-10-CM

## 2019-03-04 DIAGNOSIS — C61 PROSTATE CANCER (HCC): Primary | ICD-10-CM

## 2019-03-05 ENCOUNTER — HOSPITAL ENCOUNTER (OUTPATIENT)
Dept: RADIATION ONCOLOGY | Age: 64
Discharge: HOME | End: 2019-03-05
Payer: OTHER GOVERNMENT

## 2019-03-05 VITALS
SYSTOLIC BLOOD PRESSURE: 151 MMHG | TEMPERATURE: 98.4 F | DIASTOLIC BLOOD PRESSURE: 87 MMHG | OXYGEN SATURATION: 97 % | BODY MASS INDEX: 32.39 KG/M2 | WEIGHT: 238.8 LBS | HEART RATE: 63 BPM

## 2019-03-05 DIAGNOSIS — C61 PROSTATE CANCER (HCC): Primary | ICD-10-CM

## 2019-03-05 LAB — PSA SERPL-MCNC: 0.2 NG/ML (ref 0–4)

## 2019-03-05 PROCEDURE — 99211 OFF/OP EST MAY X REQ PHY/QHP: CPT

## 2019-03-05 RX ORDER — INDOMETHACIN 50 MG/1
50 CAPSULE ORAL DAILY
COMMUNITY
End: 2020-02-11

## 2019-03-05 NOTE — NURSE NAVIGATOR
F/u Prostate cancer. Aua/epic completed. Aua score 3. HDR 8-15-16  Space Oar 8-17-16. RT end 10-10-16. Labs 2-27-19. Having surgery for bone spur on 5-1-19. Dr. Jose Almonte.     Mars Vincent RN

## 2019-03-05 NOTE — PROGRESS NOTES
Patient: Leeanne Archibald MRN: [id number]  SSN: [id number]    YOB: 1955  Age: 59 y.o. Sex: male      DIAGNOSIS:  Adenocarcinoma of the prostate, T2a, [place] score 4+4 = 8, PSA 4.49    TREATMENT SITE AND DOSE DELIVERED:    1)  He received HDR brachytherapy under the direction of Dr. Emily Christy in [place] on 08/15/16. He also had SpaceOAR hydrogel placed on 08/17/16. Pelvis has received 37.8 Gy of 45 Gy in 25/25 fractions that he completed 10/10/16. HISTORY OF PRESENT ILLNESS: Leeanne Archibald is a 62year old male seen initially by Dr. Chiquis Hartman on 03/16/16 at the request of Dr. Blaine Hoover for further discussion of management options for this high risk prostate cancer. He was found to have an elevated PSA of 4.49 by his primary care physician. He was then referred to Dr. Blaine Hoover for further evaluation. Dr. Blaine Hoover palpated a 1 cm nodule at the right apex. He has a family history of prostate cancer in his father. Prostate biopsy on 01/04/16 demonstrated a 36.17 g prostate for a PSA density of 0.12. Pathology showed [place] score 4+4 = 8 disease in 25% of the tissue from the left lateral base. 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 High-grade PIN was also seen in tissue from the right lateral base. No other biopsy cores contained cancer. MRI of the pelvis on 02/02/16 showed significant heterogeneity throughout the peripheral zone of the prostate. The most convincing focus of T2 signal loss was seen at the left posterior base where a nodule measuring 9 mm was seen. Abnormal signal change was seen in the bilateral seminal vesicles which was felt likely to represent hemorrhage from biopsy. No extracapsular disease was identified. No pelvic lymphadenopathy or other metastatic disease was seen in the pelvis or visualized abdomen. Bone scan on 02/02/16 showed no evidence of bony metastatic disease. He had a thorough discussion with Dr. Blaine Hoover regarding various treatment options.  He was interested in further discussion of radiation therapy.     INTERVAL HISTORY:  He received a single dose of Eligard on 4/7/16 and completed 6 months of casodex on 10/10/16. Mr FAIZAN JOSUE [facility] returns 29 months out from completing radiation therapy. He has an AUA score of 3/35 and is \"mostly satisfied\" with his urinary function. His urinary function is at his baseline. He continues with nocturia x1. Otherwise denies frequency, urgency, leakage, dysuria or hematuria. He has good force of stream. He is no longer on Flomax and doesn't notice a difference with his urinary function. He has good bowel function with occasional constipation. Energy is great. Feels well. He retired 11/1/16 and has opened up a [employer] with his son. He continues with ED. He has tried Viagra, Cialis and Trimix the past with little to no results. PHYSICAL EXAM:  VITAL SIGNS: Blood pressure 151/87   Pulse  63  Temperature 98.4   Weight 238.8     ECOG SCORE: 0  GENERAL: The patient is well-developed, ambulatory, alert and in no acute distress. LABS:   02/27/19: PSA 0.2 with testosterone of 246  08/20/18: PSA 0.2 with testosterone of 215  02/20/18: PSA 0.5 with testosterone of 275  08/15/17: PSA 0.3 with testosterone of 269  05/16/17: PSA 0.4 with testosterone of 161  02/07/17: PSA 0.3 with testosterone of 247  11/11/16: PSA <0.1 with testosterone of <3    ASSESSMENT:  Nina Krause with Adenocarcinoma of the prostate, T2a, [place] score 4+4 = 8, PSA 4.49 . He received HDR brachytherapy under the direction of Dr. Bala Prakash in [place] on 08/15/16 then radiation, completed 10/10/16. Mr FAIZAN JOSUE [facility] is now 29 months after completing radiation therapy. He is recovering as anticipated from radiation and ADT therapy. He is concerned regarding his ED. He has tried Cialis,  Viagra and trimix with no improvement. His testosterone continues to fluctuate between 269 and 246. He reports loss of stamina, fatigue and erectile dysfunction.  We discussed possibly trying androgel in the future, but recommend more time to evaluate his PSA and testosterone level. Mr Ayush Case is in agreement for now. He is also asking to continue his follow up with lab work with our office. PLAN:  1) I will see him again in 3 months with PSA/Testosterone prior to his visit. 2) We discussed ongoing surveillance for his prostate cancer, which would include follow up every 3 months with  PSA/Testosterone x2 years then every 6 months generally. All questions were answered to the best of my ability. 3) I spent 25 minutes in the care of Mr Ayush Case today, over 75% of which was in direct counseling and ongoing management of his adenocarcinoma of the prostate and use of testosterone supplementation. All questions were answered to the best of my ability. Jacky Linares NP   March 5, 2019      Portions of this note were copied from prior encounters and reviewed for accuracy, currency, and represent documentation and tasks completed during this encounter. I verify and attest these portions to be unchanged from prior visits.

## 2019-06-03 ENCOUNTER — HOSPITAL ENCOUNTER (OUTPATIENT)
Dept: RADIATION ONCOLOGY | Age: 64
Discharge: HOME | End: 2019-06-03
Payer: OTHER GOVERNMENT

## 2019-06-03 DIAGNOSIS — C61 PROSTATE CANCER (HCC): ICD-10-CM

## 2019-06-03 LAB — PSA SERPL-MCNC: 0.2 NG/ML

## 2019-06-03 PROCEDURE — 84153 ASSAY OF PSA TOTAL: CPT

## 2019-06-03 PROCEDURE — 84403 ASSAY OF TOTAL TESTOSTERONE: CPT

## 2019-06-03 PROCEDURE — 36415 COLL VENOUS BLD VENIPUNCTURE: CPT

## 2019-06-04 LAB — TESTOST SERPL-MCNC: 234 NG/DL (ref 264–916)

## 2019-06-11 ENCOUNTER — HOSPITAL ENCOUNTER (OUTPATIENT)
Dept: RADIATION ONCOLOGY | Age: 64
Discharge: HOME | End: 2019-06-11
Payer: OTHER GOVERNMENT

## 2019-06-11 VITALS
WEIGHT: 240.8 LBS | OXYGEN SATURATION: 98 % | HEART RATE: 61 BPM | SYSTOLIC BLOOD PRESSURE: 147 MMHG | BODY MASS INDEX: 32.61 KG/M2 | TEMPERATURE: 98.1 F | RESPIRATION RATE: 18 BRPM | HEIGHT: 72 IN | DIASTOLIC BLOOD PRESSURE: 87 MMHG

## 2019-06-11 DIAGNOSIS — C61 PROSTATE CANCER (HCC): Primary | ICD-10-CM

## 2019-06-11 PROCEDURE — 99211 OFF/OP EST MAY X REQ PHY/QHP: CPT

## 2019-06-11 RX ORDER — TADALAFIL 5 MG/1
5 TABLET ORAL DAILY
Qty: 90 TAB | Refills: 3 | Status: SHIPPED | OUTPATIENT
Start: 2019-06-11 | End: 2019-09-17 | Stop reason: SDUPTHER

## 2019-06-11 NOTE — PROGRESS NOTES
6 Month Follow Up  LABS (06/03/2019): -PSA: 0.2, Test: 234  LABS (02/27/2019): -PSA: 0.2  HDR: 08/15/2016  Space Oar: 08/17/2016  RT End: 10/10/2016  Lupron: 04/07/2016  AUA/Epic: 4  Patient has various urinary complaints but states he is mostly satisfied with the quality of life due to his urinary symptoms.         Yamilet Villalobos, CMA

## 2019-06-11 NOTE — PROGRESS NOTES
Patient: Mohini Hagen MRN: [id number]  SSN: [id number]    YOB: 1955  Age: 59 y.o. Sex: male      DIAGNOSIS:  Adenocarcinoma of the prostate, T2a, Roxanna score 4+4 = 8, PSA 4.49    TREATMENT SITE AND DOSE DELIVERED:    1)  He received HDR brachytherapy under the direction of Dr. Pantera Howe in [place] on 08/15/16. He also had SpaceOAR hydrogel placed on 08/17/16. Pelvis has received 37.8 Gy of 45 Gy in 25/25 fractions that he completed 10/10/16. HISTORY OF PRESENT ILLNESS: Mohini Hagen is a 59year old male seen initially by Dr. Leelee Blackman on 03/16/16 at the request of Dr. Rosemary Maxwell for further discussion of management options for this high risk prostate cancer. He was found to have an elevated PSA of 4.49 by his primary care physician. He was then referred to Dr. Rosemary Maxwell for further evaluation. Dr. Rosemary Maxwell palpated a 1 cm nodule at the right apex. He has a family history of prostate cancer in his father. Prostate biopsy on 01/04/16 demonstrated a 36.17 g prostate for a PSA density of 0.12. Pathology showed [place] score 4+4 = 8 disease in 25% of the tissue from the left lateral base. 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 High-grade PIN was also seen in tissue from the right lateral base. No other biopsy cores contained cancer. MRI of the pelvis on 02/02/16 showed significant heterogeneity throughout the peripheral zone of the prostate. The most convincing focus of T2 signal loss was seen at the left posterior base where a nodule measuring 9 mm was seen. Abnormal signal change was seen in the bilateral seminal vesicles which was felt likely to represent hemorrhage from biopsy. No extracapsular disease was identified. No pelvic lymphadenopathy or other metastatic disease was seen in the pelvis or visualized abdomen. Bone scan on 02/02/16 showed no evidence of bony metastatic disease. He had a thorough discussion with Dr. Rosemary Maxwell regarding various treatment options.  He was interested in further discussion of radiation therapy.     INTERVAL HISTORY:  He received a single dose of Eligard on 4/7/16 and completed 6 months of casodex on 10/10/16. Mr Gamboa Patient returns 32 months out from completing radiation therapy. He has an AUA score of 4/35 and is \"mostly satisfied\" with his urinary function. His urinary function is at his baseline. He continues with nocturia x1. Otherwise denies frequency, urgency, leakage, dysuria or hematuria. He has good force of stream. He is no longer on Flomax and doesn't notice a difference with his urinary function. He has good bowel function with occasional constipation. Energy is great. Feels well. He retired 11/1/16 and has opened up a [employer] with his son. He continues with ED. He has tried Viagra, Cialis and Trimix in the past with little to no results. PHYSICAL EXAM:  VITAL SIGNS: Blood pressure 147/87   Pulse  63  Temperature 98.4   Weight 240.8     ECOG SCORE: 0    GENERAL: The patient is well-developed, ambulatory, alert and in no acute distress. LABS:   06/03/19: PSA 0.2 with testosterone of 234  02/27/19: PSA 0.2 with testosterone of 246  08/20/18: PSA 0.2 with testosterone of 215  02/20/18: PSA 0.5 with testosterone of 275  08/15/17: PSA 0.3 with testosterone of 269  05/16/17: PSA 0.4 with testosterone of 161  02/07/17: PSA 0.3 with testosterone of 247  11/11/16: PSA <0.1 with testosterone of <3    ASSESSMENT:  Viktor York with Adenocarcinoma of the prostate, T2a, [place] score 4+4 = 8, PSA 4.49 . He received HDR brachytherapy under the direction of Dr. Chino Valentin in [place] on 08/15/16 then radiation, completed 10/10/16. Mr Gamboa Patient is now 32 months after completing radiation therapy. He is recovering as anticipated from radiation and ADT therapy. He is concerned regarding his ED. He has tried Cialis,  Viagra and trimix with no improvement. His testosterone continues to fluctuate remaining in the low to mid 200s.   I discussed our concern with him trying testosterone supplementation with his high risk prostate cancer. It was agreed to try Cialis 5mg daily dosing for 3 months. Mr Anjelica Lyons is in agreement for now. He is also asking to continue his follow up with lab work with our office. PLAN:  1) I will see him again in 3 months to assess use of Cialis at 5mg daily dosing with PSA/Testosterone prior to his visit. 2) We discussed ongoing surveillance for his prostate cancer, which would include follow up every 3 months with  PSA/Testosterone x2 years then every 6 months generally. All questions were answered to the best of my ability. 3) ED/Stamina: Cialis 5mg daily - prescription provided   4) I spent 25 minutes in the care of Mr Anjelica Lyons today, over 75% of which was in direct counseling and ongoing management of his adenocarcinoma of the prostate and use of testosterone supplementation. All questions were answered to the best of my ability. Mirta Norris NP   June 11, 2019      Portions of this note were copied from prior encounters and reviewed for accuracy, currency, and represent documentation and tasks completed during this encounter. I verify and attest these portions to be unchanged from prior visits.

## 2019-07-09 ENCOUNTER — ANESTHESIA EVENT (OUTPATIENT)
Dept: SURGERY | Age: 64
End: 2019-07-09
Payer: OTHER GOVERNMENT

## 2019-07-09 RX ORDER — SODIUM CHLORIDE 0.9 % (FLUSH) 0.9 %
5-40 SYRINGE (ML) INJECTION AS NEEDED
Status: CANCELLED | OUTPATIENT
Start: 2019-07-09

## 2019-07-09 RX ORDER — SODIUM CHLORIDE 0.9 % (FLUSH) 0.9 %
5-40 SYRINGE (ML) INJECTION EVERY 8 HOURS
Status: CANCELLED | OUTPATIENT
Start: 2019-07-09

## 2019-07-09 NOTE — ANESTHESIA PREPROCEDURE EVALUATION
Relevant Problems   No relevant active problems       Anesthetic History               Review of Systems / Medical History  Patient summary reviewed and pertinent labs reviewed    Pulmonary          Smoker (Former)         Neuro/Psych              Cardiovascular    Hypertension          Hyperlipidemia    Exercise tolerance: >4 METS     GI/Hepatic/Renal                Endo/Other        Obesity     Other Findings              Physical Exam    Airway  Mallampati: II    Neck ROM: normal range of motion   Mouth opening: Normal     Cardiovascular  Regular rate and rhythm,  S1 and S2 normal,  no murmur, click, rub, or gallop             Dental  No notable dental hx       Pulmonary  Breath sounds clear to auscultation               Abdominal         Other Findings            Anesthetic Plan    ASA: 2  Anesthesia type: general      Post-op pain plan if not by surgeon: peripheral nerve block single      Anesthetic plan and risks discussed with: Patient

## 2019-07-09 NOTE — H&P
HPI:    12-- Bilateral bunionectomies, cheilectomies with gouty debridement- MET    October 2018 - Right medial and lateral ankle sharp pain   January 2019 - Fabian Can ordered MRI - see report       Location of pain - Right ankle   Type/severity pain -    throbbing  Aggravating factors -   standing, walking and a lot of activity  Alleviating factors -  rest    PMSFH:  Included on the patient history form ; reviewed   MEDS:         Amlodipine Besy-Benazepril HCl; Indomethacin (50 MG); PredniSONE (10 MG (48), Take as directed); Simvastatin, Colchicine    ALLERGIES:  NKDA  PMH:  Cholesterol, HTN, GOUT   SOCIAL: Retired, , nonsmoker    ROS:  Per POA form: positive and negative system reviews were discussed. I tried to relate any positive system review to the musculoskeletal complaint. For all others, recommend the PCP or any specialists    PE:  Patient is alert and oriented. Nutrition, appearance and mood all okay. RESPIRATIONS:  Unlabored with no obvious shortness of breath. CV:  Appears to have pedal pulses, color, capillary refill, subungual color. Varicosities     NEURO:  No abnormal reflexes or clonus. Sensation appears mostly intact to light touch and sharp/dull discrimination. SLR negative. No popliteal tenderness     SKIN:  Age nails; no swelling; no keloids    MS:  Standing = plantigrade feet. Gait okay  ankle pain; limited ankle, subtalar motion. 5/5 strength; no ankle instability, crepitance     XR: Right side - Standing Mortise, AP, lateral ankle and AP, oblique foot taken prior w/ large dorsal talar spurring, big toe retained plate, lateral talar OCD   XR Impression:  As above     MRI Right ankle 1/15/2019     DX:   Right ankle pain, spurring, lateral talar osteochondral defect      The patient and I discussed the above diagnoses and explored different options.     Discussed his acute on chronic recurring pain     Medication -  OTC meds prn             Surgery -   Right   ankle exostectomy , reconstruction, osteochondral debridement    op - anesthesia choice  - 1 ½ hours  drill bits   R/C outlined, non-healing of the osteotomy [if needed], progressive arthritis, spurring, stiffness, infection, no pain relief or potentially an increase in the existing pain. Potential one year recovery and time of immobilization outlined.  Accepts

## 2019-07-10 ENCOUNTER — ANESTHESIA (OUTPATIENT)
Dept: SURGERY | Age: 64
End: 2019-07-10
Payer: OTHER GOVERNMENT

## 2019-07-10 ENCOUNTER — HOSPITAL ENCOUNTER (OUTPATIENT)
Age: 64
Setting detail: OUTPATIENT SURGERY
Discharge: HOME | End: 2019-07-10
Attending: ORTHOPAEDIC SURGERY | Admitting: ORTHOPAEDIC SURGERY
Payer: OTHER GOVERNMENT

## 2019-07-10 VITALS
SYSTOLIC BLOOD PRESSURE: 135 MMHG | DIASTOLIC BLOOD PRESSURE: 75 MMHG | WEIGHT: 240.38 LBS | OXYGEN SATURATION: 96 % | BODY MASS INDEX: 32.6 KG/M2 | HEART RATE: 49 BPM | RESPIRATION RATE: 15 BRPM | TEMPERATURE: 97.6 F

## 2019-07-10 PROCEDURE — 77030003902 HC BIT DRL TWST ZIMM -B: Performed by: ORTHOPAEDIC SURGERY

## 2019-07-10 PROCEDURE — 74011250636 HC RX REV CODE- 250/636: Performed by: ANESTHESIOLOGY

## 2019-07-10 PROCEDURE — 77030013921: Performed by: ORTHOPAEDIC SURGERY

## 2019-07-10 PROCEDURE — 76010000162 HC OR TIME 1.5 TO 2 HR INTENSV-TIER 1: Performed by: ORTHOPAEDIC SURGERY

## 2019-07-10 PROCEDURE — 76210000006 HC OR PH I REC 0.5 TO 1 HR: Performed by: ORTHOPAEDIC SURGERY

## 2019-07-10 PROCEDURE — 77030031139 HC SUT VCRL2 J&J -A: Performed by: ORTHOPAEDIC SURGERY

## 2019-07-10 PROCEDURE — 77030018836 HC SOL IRR NACL ICUM -A: Performed by: ORTHOPAEDIC SURGERY

## 2019-07-10 PROCEDURE — 77030003602 HC NDL NRV BLK BBMI -B: Performed by: ANESTHESIOLOGY

## 2019-07-10 PROCEDURE — 74011250636 HC RX REV CODE- 250/636

## 2019-07-10 PROCEDURE — 77030000032 HC CUF TRNQT ZIMM -B: Performed by: ORTHOPAEDIC SURGERY

## 2019-07-10 PROCEDURE — 76942 ECHO GUIDE FOR BIOPSY: CPT | Performed by: ORTHOPAEDIC SURGERY

## 2019-07-10 PROCEDURE — 77030002966 HC SUT PDS J&J -A: Performed by: ORTHOPAEDIC SURGERY

## 2019-07-10 PROCEDURE — 74011250636 HC RX REV CODE- 250/636: Performed by: PHYSICIAN ASSISTANT

## 2019-07-10 PROCEDURE — 77030002933 HC SUT MCRYL J&J -A: Performed by: ORTHOPAEDIC SURGERY

## 2019-07-10 PROCEDURE — 77030002916 HC SUT ETHLN J&J -A: Performed by: ORTHOPAEDIC SURGERY

## 2019-07-10 PROCEDURE — 76060000034 HC ANESTHESIA 1.5 TO 2 HR: Performed by: ORTHOPAEDIC SURGERY

## 2019-07-10 PROCEDURE — 76010010054 HC POST OP PAIN BLOCK: Performed by: ORTHOPAEDIC SURGERY

## 2019-07-10 PROCEDURE — 74011250637 HC RX REV CODE- 250/637: Performed by: ANESTHESIOLOGY

## 2019-07-10 PROCEDURE — 76210000020 HC REC RM PH II FIRST 0.5 HR: Performed by: ORTHOPAEDIC SURGERY

## 2019-07-10 RX ORDER — SODIUM CHLORIDE 0.9 % (FLUSH) 0.9 %
5-40 SYRINGE (ML) INJECTION AS NEEDED
Status: DISCONTINUED | OUTPATIENT
Start: 2019-07-10 | End: 2019-07-10 | Stop reason: HOSPADM

## 2019-07-10 RX ORDER — NALOXONE HYDROCHLORIDE 0.4 MG/ML
0.1 INJECTION, SOLUTION INTRAMUSCULAR; INTRAVENOUS; SUBCUTANEOUS AS NEEDED
Status: DISCONTINUED | OUTPATIENT
Start: 2019-07-10 | End: 2019-07-10 | Stop reason: HOSPADM

## 2019-07-10 RX ORDER — LIDOCAINE HYDROCHLORIDE 20 MG/ML
INJECTION, SOLUTION EPIDURAL; INFILTRATION; INTRACAUDAL; PERINEURAL AS NEEDED
Status: DISCONTINUED | OUTPATIENT
Start: 2019-07-10 | End: 2019-07-10 | Stop reason: HOSPADM

## 2019-07-10 RX ORDER — ROPIVACAINE HYDROCHLORIDE 5 MG/ML
INJECTION, SOLUTION EPIDURAL; INFILTRATION; PERINEURAL
Status: COMPLETED | OUTPATIENT
Start: 2019-07-10 | End: 2019-07-10

## 2019-07-10 RX ORDER — SODIUM CHLORIDE 0.9 % (FLUSH) 0.9 %
5-40 SYRINGE (ML) INJECTION EVERY 8 HOURS
Status: CANCELLED | OUTPATIENT
Start: 2019-07-10

## 2019-07-10 RX ORDER — PROPOFOL 10 MG/ML
INJECTION, EMULSION INTRAVENOUS
Status: DISCONTINUED | OUTPATIENT
Start: 2019-07-10 | End: 2019-07-10 | Stop reason: HOSPADM

## 2019-07-10 RX ORDER — CEFAZOLIN SODIUM/WATER 2 G/20 ML
2 SYRINGE (ML) INTRAVENOUS ONCE
Status: COMPLETED | OUTPATIENT
Start: 2019-07-10 | End: 2019-07-10

## 2019-07-10 RX ORDER — SODIUM CHLORIDE, SODIUM LACTATE, POTASSIUM CHLORIDE, CALCIUM CHLORIDE 600; 310; 30; 20 MG/100ML; MG/100ML; MG/100ML; MG/100ML
100 INJECTION, SOLUTION INTRAVENOUS CONTINUOUS
Status: DISCONTINUED | OUTPATIENT
Start: 2019-07-10 | End: 2019-07-10 | Stop reason: HOSPADM

## 2019-07-10 RX ORDER — SODIUM CHLORIDE 0.9 % (FLUSH) 0.9 %
5-40 SYRINGE (ML) INJECTION AS NEEDED
Status: CANCELLED | OUTPATIENT
Start: 2019-07-10

## 2019-07-10 RX ORDER — SODIUM CHLORIDE 0.9 % (FLUSH) 0.9 %
5-40 SYRINGE (ML) INJECTION EVERY 8 HOURS
Status: DISCONTINUED | OUTPATIENT
Start: 2019-07-10 | End: 2019-07-10 | Stop reason: HOSPADM

## 2019-07-10 RX ORDER — PROPOFOL 10 MG/ML
INJECTION, EMULSION INTRAVENOUS AS NEEDED
Status: DISCONTINUED | OUTPATIENT
Start: 2019-07-10 | End: 2019-07-10 | Stop reason: HOSPADM

## 2019-07-10 RX ORDER — HYDROMORPHONE HYDROCHLORIDE 2 MG/ML
0.5 INJECTION, SOLUTION INTRAMUSCULAR; INTRAVENOUS; SUBCUTANEOUS
Status: DISCONTINUED | OUTPATIENT
Start: 2019-07-10 | End: 2019-07-10 | Stop reason: HOSPADM

## 2019-07-10 RX ORDER — FAMOTIDINE 20 MG/1
20 TABLET, FILM COATED ORAL ONCE
Status: COMPLETED | OUTPATIENT
Start: 2019-07-10 | End: 2019-07-10

## 2019-07-10 RX ORDER — OXYCODONE HYDROCHLORIDE 5 MG/1
5 TABLET ORAL
Status: DISCONTINUED | OUTPATIENT
Start: 2019-07-10 | End: 2019-07-10 | Stop reason: HOSPADM

## 2019-07-10 RX ORDER — LIDOCAINE HYDROCHLORIDE 10 MG/ML
0.1 INJECTION INFILTRATION; PERINEURAL AS NEEDED
Status: DISCONTINUED | OUTPATIENT
Start: 2019-07-10 | End: 2019-07-10 | Stop reason: HOSPADM

## 2019-07-10 RX ORDER — HYDROCODONE BITARTRATE AND ACETAMINOPHEN 7.5; 325 MG/1; MG/1
1 TABLET ORAL AS NEEDED
Status: DISCONTINUED | OUTPATIENT
Start: 2019-07-10 | End: 2019-07-10 | Stop reason: HOSPADM

## 2019-07-10 RX ORDER — FENTANYL CITRATE 50 UG/ML
100 INJECTION, SOLUTION INTRAMUSCULAR; INTRAVENOUS ONCE
Status: COMPLETED | OUTPATIENT
Start: 2019-07-10 | End: 2019-07-10

## 2019-07-10 RX ORDER — SODIUM CHLORIDE 9 MG/ML
25 INJECTION, SOLUTION INTRAVENOUS CONTINUOUS
Status: DISCONTINUED | OUTPATIENT
Start: 2019-07-10 | End: 2019-07-10 | Stop reason: HOSPADM

## 2019-07-10 RX ORDER — MIDAZOLAM HYDROCHLORIDE 1 MG/ML
2 INJECTION, SOLUTION INTRAMUSCULAR; INTRAVENOUS
Status: COMPLETED | OUTPATIENT
Start: 2019-07-10 | End: 2019-07-10

## 2019-07-10 RX ADMIN — Medication 2 G: at 07:37

## 2019-07-10 RX ADMIN — PROPOFOL 140 MCG/KG/MIN: 10 INJECTION, EMULSION INTRAVENOUS at 07:30

## 2019-07-10 RX ADMIN — ROPIVACAINE HYDROCHLORIDE 15 ML: 5 INJECTION, SOLUTION EPIDURAL; INFILTRATION; PERINEURAL at 06:59

## 2019-07-10 RX ADMIN — PROPOFOL 50 MG: 10 INJECTION, EMULSION INTRAVENOUS at 07:30

## 2019-07-10 RX ADMIN — SODIUM CHLORIDE, SODIUM LACTATE, POTASSIUM CHLORIDE, AND CALCIUM CHLORIDE 100 ML/HR: 600; 310; 30; 20 INJECTION, SOLUTION INTRAVENOUS at 06:10

## 2019-07-10 RX ADMIN — HYDROCODONE BITARTRATE AND ACETAMINOPHEN 1 TABLET: 7.5; 325 TABLET ORAL at 09:36

## 2019-07-10 RX ADMIN — FENTANYL CITRATE 100 MCG: 50 INJECTION INTRAMUSCULAR; INTRAVENOUS at 06:56

## 2019-07-10 RX ADMIN — HYDROMORPHONE HYDROCHLORIDE 0.5 MG: 2 INJECTION INTRAMUSCULAR; INTRAVENOUS; SUBCUTANEOUS at 09:15

## 2019-07-10 RX ADMIN — LIDOCAINE HYDROCHLORIDE 100 MG: 20 INJECTION, SOLUTION EPIDURAL; INFILTRATION; INTRACAUDAL; PERINEURAL at 07:30

## 2019-07-10 RX ADMIN — MIDAZOLAM HYDROCHLORIDE 2 MG: 2 INJECTION, SOLUTION INTRAMUSCULAR; INTRAVENOUS at 06:56

## 2019-07-10 RX ADMIN — FAMOTIDINE 20 MG: 20 TABLET ORAL at 06:09

## 2019-07-10 RX ADMIN — HYDROMORPHONE HYDROCHLORIDE 0.5 MG: 2 INJECTION INTRAMUSCULAR; INTRAVENOUS; SUBCUTANEOUS at 09:25

## 2019-07-10 RX ADMIN — ROPIVACAINE HYDROCHLORIDE 10 ML: 5 INJECTION, SOLUTION EPIDURAL; INFILTRATION; PERINEURAL at 07:02

## 2019-07-10 RX ADMIN — HYDROMORPHONE HYDROCHLORIDE 0.5 MG: 2 INJECTION INTRAMUSCULAR; INTRAVENOUS; SUBCUTANEOUS at 09:08

## 2019-07-10 NOTE — ANESTHESIA POSTPROCEDURE EVALUATION
Procedure(s):  RIGHT ANKLE EXOSTECTOMY/ RECONSTRUCTION/ OSTEOCHONDRAL DEBRIDEMENT.     general    Anesthesia Post Evaluation        Patient location during evaluation: PACU  Patient participation: complete - patient participated  Level of consciousness: awake and alert  Pain management: adequate  Airway patency: patent  Anesthetic complications: no  Cardiovascular status: acceptable  Respiratory status: acceptable  Hydration status: acceptable  Post anesthesia nausea and vomiting:  none      Vitals Value Taken Time   /75 7/10/2019  9:59 AM   Temp     Pulse 49 7/10/2019  9:59 AM   Resp 15 7/10/2019  9:33 AM   SpO2 89 % 7/10/2019  9:59 AM

## 2019-07-10 NOTE — DISCHARGE INSTRUCTIONS
INSTRUCTIONS FOLLOWING FOOT SURGERY    ACTIVITY  Elevate foot. No Ice  Let the office know if dressing gets saturated with water . No weight bearing. Use crutches or knee walker until seen in follow up appointment  Don't put anything into the splint to relieve itching etc.   Take one 325mg aspirin daily if okay with your medical doctor and you have no GI ulcer. Get up and out of bed frequently. While in bed move the legs as much as possible    DIET  Day of Surgery: Clear liquids until no nausea or vomiting; then light, bland diet (Baked chicken, plain rice, grits, scrambled egg, toast). Nothing Greasy, fried or spicy today  Advance to regular diet on second day, unless your doctor orders otherwise. PAIN  Take pain medications as directed by your doctor. Call your doctor if pain is NOT relieved by medication. PAIN MED SIDE EFFECTS  Constipation: Lots of fluids, try prune juice, then OTC stool softeners if necessary  Nausea: Take medication with food. DRESSING CARE Keep dry and in place until follow up appointment    CALL YOUR DOCTOR IF YOU HAVE  Excessive bleeding that does not stop after holding mild pressure over the area. Temperature of 101 degrees or above. Redness, excessive swelling or bruising, and/or green or yellow, smelly discharge from incision. Loss of sensation - cold, white or blue toes. AFTER ANESTHESIA  For the first 24 hours and while taking narcotics for pain: DO NOT Drive, Drink Alcoholic beverages, or make important Decisions. Be aware of dizziness following anesthesia and while taking pain medication. Preventing Infection at Home  We care about preventing infection and avoiding the spread of germs - not only when you are in the hospital but also when you return home. When you return home from the hospital, its important to take the following steps to help prevent infection and avoid spreading germs that could infect you and others.  Ask everyone in your home to follow these guidelines, too. Clean Your Hands  · Clean your hands whenever your hands are visibly dirty, before you eat, before or after touching your mouth, nose or eyes, and before preparing food. Clean them after contact with body fluids, using the restroom, touching animals or changing diapers. · When washing hands, wet them with warm water and work up a lather. Rub hands for at least 15 seconds, then rinse them and pat them dry with a clean towel or paper towel. · When using hand sanitizers, it should take about 15 seconds to rub your hands dry. If not, you probably didnt apply enough . Cover Your Sneeze or Cough  Germs are released into the air whenever you sneeze or cough. To prevent the spread of infection:  · Turn away from other people before coughing or sneezing. · Cover your mouth or nose with a tissue when you cough or sneeze. Put the tissue in the trash. · If you dont have a tissue, cough or sneeze into your upper sleeve, not your hands. · Always clean your hands after coughing or sneezing. Care for Wounds  Your skin is your bodys first line of defense against germs, but an open wound leaves an easy way for germs to enter your body. To prevent infection:  · Clean your hands before and after changing wound dressings, and wear gloves to change dressings if recommended by your doctor. · Take special care with IV lines or other devices inserted into the body. If you must touch them, clean your hands first.  · Follow any specific instructions from your doctor to care for your wounds. Contact your doctor if you experience any signs of infection, such as fever or increased redness at the surgical or wound site. Keep a Clean Home  · Clean or wipe commonly touched hard surfaces like door handles, sinks, tabletops, phones and TV remotes. · Use products labeled disinfectant to kill harmful bacteria and viruses. · Use a clean cloth or paper towel to clean and dry surfaces.  Wiping surfaces with a dirty dishcloth, sponge or towel will only spread germs. · Never share toothbrushes, ruiz, drinking glasses, utensils, razor blades, face cloths or bath towels to avoid spreading germs. · Be sure that the linens that you sleep on are clean. · Keep pets away from wounds and wash your hands after touching pets, their toys or bedding. We care about you and your health. Remember, preventing infections is a team effort between you, your family, friends and health care providers. DISCHARGE SUMMARY from Nurse    PATIENT INSTRUCTIONS:    After general anesthesia or intravenous sedation, for 24 hours or while taking prescription Narcotics:  · Limit your activities  · Do not drive and operate hazardous machinery  · Do not make important personal or business decisions  · Do  not drink alcoholic beverages  · If you have not urinated within 8 hours after discharge, please contact your surgeon on call. *  Please give a list of your current medications to your Primary Care Provider. *  Please update this list whenever your medications are discontinued, doses are      changed, or new medications (including over-the-counter products) are added. *  Please carry medication information at all times in case of emergency situations. These are general instructions for a healthy lifestyle:    No smoking/ No tobacco products/ Avoid exposure to second hand smoke    Surgeon General's Warning:  Quitting smoking now greatly reduces serious risk to your health.     Obesity, smoking, and sedentary lifestyle greatly increases your risk for illness    A healthy diet, regular physical exercise & weight monitoring are important for maintaining a healthy lifestyle    You may be retaining fluid if you have a history of heart failure or if you experience any of the following symptoms:  Weight gain of 3 pounds or more overnight or 5 pounds in a week, increased swelling in our hands or feet or shortness of breath while lying flat in bed. Please call your doctor as soon as you notice any of these symptoms; do not wait until your next office visit. Recognize signs and symptoms of STROKE:    F-face looks uneven    A-arms unable to move or move unevenly    S-speech slurred or non-existent    T-time-call 911 as soon as signs and symptoms begin-DO NOT go       Back to bed or wait to see if you get better-TIME IS BRAIN.

## 2019-07-10 NOTE — PROGRESS NOTES
's Pre-op visit requested by patient. Conveyed care and concern for patient and family. Offered prayer as requested for patient, family, and staff.     Uli Gray MDiv, BS  Board Certified

## 2019-07-10 NOTE — H&P
Outpatient Surgery History and Physical:  Mickey Page     CHIEF COMPLAINT:   LE    PE:     Visit Vitals  /77 (BP 1 Location: Right arm, BP Patient Position: At rest)   Pulse (!) 53   Temp 98.2 °F (36.8 °C)   Resp 16   Wt 109 kg (240 lb 6 oz)   SpO2 98%   BMI 32.60 kg/m²       Heart:  No noted problems   Lungs:  No noted problems        Past Medical History:    Patient Active Problem List    Diagnosis    Erectile dysfunction    Prostate cancer ([address]    Elevated PSA    Prostate nodule       Surgical History:   Past Surgical History:   Procedure Laterality Date    HX BUNIONECTOMY Bilateral 2013    HX COLONOSCOPY      HX KNEE ARTHROSCOPY Bilateral 2005    bilateral knees    HX ORTHOPAEDIC Left     left achilles tendon repair       Social History: Patient  reports that he quit smoking about 24 years ago. He has a 10.00 pack-year smoking history. He has never used smokeless tobacco. He reports that he does not drink alcohol or use drugs. Family History:   Family History   Problem Relation Age of Onset   [facility] Cancer Mother         intestinal    Cancer Father         prostate       Allergies: Reviewed per EMR  No Known Allergies    Medications:    No current facility-administered medications on file prior to encounter. Current Outpatient Medications on File Prior to Encounter   Medication Sig    simvastatin (ZOCOR) 20 mg tablet Take 20 mg by mouth every morning.  amLODIPine (NORVASC) 10 mg tablet Take 10 mg by mouth every morning. The surgery is planned for the right ankle. History and physical have been reviewed. There have been no significant  changes since the completion of the updated history and physical.    Necessity for the procedure/care is still present and the updated history and physical office notes outline the full long term history of the problem. Please see the updated office notes for the full musculoskeletal examination and surgical plan.     Signed By: Paty Galindo Lucía Sheehan MD     July 10, 2019 7:22 AM

## 2019-07-10 NOTE — ANESTHESIA PROCEDURE NOTES
Peripheral Block    Start time: 7/10/2019 6:55 AM  End time: 7/10/2019 6:59 AM  Performed by: Juliet Goodell, MD  Authorized by: Juliet Goodell, MD       Pre-procedure:    Indications: at surgeon's request and post-op pain management    Preanesthetic Checklist: patient identified, risks and benefits discussed, site marked, timeout performed, anesthesia consent given and patient being monitored    Timeout Time: 06:55          Block Type:   Block Type:  Popliteal  Laterality:  Right  Monitoring:  Continuous pulse ox, frequent vital sign checks, heart rate, responsive to questions and oxygen  Injection Technique:  Single shot  Procedures: ultrasound guided and nerve stimulator    Prep: DuraPrep    Location:  Lower thigh  Needle Type:  Stimuplex  Needle Gauge:  20 G  Needle Localization:  Nerve stimulator and ultrasound guidance  Motor Response: minimal motor response >0.4 mA      Assessment:  Number of attempts:  1  Injection Assessment:  Incremental injection every 5 mL, local visualized surrounding nerve on ultrasound, negative aspiration for blood, no intravascular symptoms, no paresthesia and ultrasound image on chart  Patient tolerance:  Patient tolerated the procedure well with no immediate complications

## 2019-07-10 NOTE — ANESTHESIA PROCEDURE NOTES
Peripheral Block    Start time: 7/10/2019 7:00 AM  End time: 7/10/2019 7:02 AM  Performed by: Gabi Miller MD  Authorized by: Gabi Miller MD       Pre-procedure: Indications: at surgeon's request and post-op pain management    Preanesthetic Checklist: patient identified, risks and benefits discussed, site marked, timeout performed, anesthesia consent given and patient being monitored    Timeout Time: 07:00          Block Type:   Block Type:   Adductor canal  Laterality:  Right  Monitoring:  Continuous pulse ox, frequent vital sign checks, heart rate, oxygen and responsive to questions  Injection Technique:  Single shot  Procedures: ultrasound guided    Patient Position: supine  Prep: DuraPrep    Location:  Mid thigh  Needle Type:  Stimuplex  Needle Gauge:  20 G  Needle Localization:  Nerve stimulator and ultrasound guidance  Motor Response: minimal motor response >0.4 mA      Assessment:  Number of attempts:  1  Injection Assessment:  Incremental injection every 5 mL, local visualized surrounding nerve on ultrasound, negative aspiration for blood, no intravascular symptoms, no paresthesia and ultrasound image on chart  Patient tolerance:  Patient tolerated the procedure well with no immediate complications

## 2019-07-11 NOTE — OP NOTES
[facility]  OPERATIVE REPORT    Name:  Allegra Perry  MR#:  [id number]  :  1955  ACCOUNT #:  [de-identified]  DATE OF SERVICE:  07/10/2019    PREOPERATIVE DIAGNOSES:  1. Right ankle impingement spurring/arthritis. 2.  Right lateral talar dome osteochondral defect. POSTOPERATIVE DIAGNOSES:  1. Right ankle impingement spurring/arthritis. 2.  Right lateral talar dome osteochondral defect. PROCEDURES PERFORMED:  1. Right ankle extensive debridement, tibial, fibular, talar exostectomies, loose body resection. 2.  Right lateral talar dome 8 x 8 mm osteochondral defect resection. SURGEON:  Aroldo Crandall. MD Roxanna    ANESTHESIA:  Regional.    COMPLICATIONS:  None. SPECIMENS REMOVED:  None. ESTIMATED BLOOD LOSS:  Minimal.    FLUIDS:  Crystalloid. DRAINS:  None. TOURNIQUET TIME:  Less than an hour. FINDINGS:  Intraoperatively, the patient had significant involvement in the ankle with impingement spurring involving the tibia, fibula and dorsal talus. Loose bodies were also noted in the ankle. An 8 x 8 mm osteochondral defect lateral talar dome that was at the shoulder; therefore, not a graft candidate. PROCEDURE:  After successful induction of regional anesthesia, right leg was scrubbed, prepped and draped in usual sterile fashion. Antibiotic issued. Time-out procedure and identification of surgical markings were done by me. Through medial and lateral approaches, I was able to debride all gross evidence of impingement spurring and remove all loose bodies. Ankle seemed to have free unabated motion at that time. Lateral talar dome osteochondral defect was debrided of all loose fragments and treated with a microfracture technique. Wound was thoroughly cleaned and then closed with PDS, Monocryl and Ethilon. The patient was placed in a sterile dressing and short-leg splint and seemed to tolerate procedures well.       Virgen England MD [email or web address]  D:  07/10/2019 15:55  T:  07/10/2019 16:02  JOB #:  [id number]

## 2019-09-03 ENCOUNTER — APPOINTMENT (OUTPATIENT)
Dept: RADIATION ONCOLOGY | Age: 64
End: 2019-09-03
Payer: OTHER GOVERNMENT

## 2019-09-10 ENCOUNTER — APPOINTMENT (OUTPATIENT)
Dept: RADIATION ONCOLOGY | Age: 64
End: 2019-09-10
Payer: OTHER GOVERNMENT

## 2019-09-13 ENCOUNTER — HOSPITAL ENCOUNTER (OUTPATIENT)
Dept: RADIATION ONCOLOGY | Age: 64
Discharge: HOME | End: 2019-09-13
Payer: OTHER GOVERNMENT

## 2019-09-13 DIAGNOSIS — C61 PROSTATE CANCER (HCC): ICD-10-CM

## 2019-09-13 LAB — PSA SERPL-MCNC: 0.1 NG/ML

## 2019-09-13 PROCEDURE — 84153 ASSAY OF PSA TOTAL: CPT

## 2019-09-13 PROCEDURE — 36415 COLL VENOUS BLD VENIPUNCTURE: CPT

## 2019-09-13 PROCEDURE — 84403 ASSAY OF TOTAL TESTOSTERONE: CPT

## 2019-09-14 LAB — TESTOST SERPL-MCNC: 249 NG/DL (ref 264–916)

## 2019-09-17 ENCOUNTER — HOSPITAL ENCOUNTER (OUTPATIENT)
Dept: RADIATION ONCOLOGY | Age: 64
Discharge: HOME | End: 2019-09-17
Payer: OTHER GOVERNMENT

## 2019-09-17 VITALS
OXYGEN SATURATION: 99 % | SYSTOLIC BLOOD PRESSURE: 135 MMHG | BODY MASS INDEX: 33.35 KG/M2 | TEMPERATURE: 98.8 F | RESPIRATION RATE: 16 BRPM | WEIGHT: 245.9 LBS | DIASTOLIC BLOOD PRESSURE: 75 MMHG | HEART RATE: 75 BPM

## 2019-09-17 DIAGNOSIS — C61 PROSTATE CANCER (HCC): Primary | ICD-10-CM

## 2019-09-17 PROCEDURE — 99211 OFF/OP EST MAY X REQ PHY/QHP: CPT

## 2019-09-17 RX ORDER — TADALAFIL 20 MG/1
20 TABLET ORAL AS NEEDED
Qty: 20 TAB | Refills: 2 | Status: SHIPPED | OUTPATIENT
Start: 2019-09-17 | End: 2021-04-22 | Stop reason: SDUPTHER

## 2019-09-17 NOTE — PROGRESS NOTES
3 Month Follow Up    HDR - 08/15/2016  SpaceOAR - 08/17/2016  RT End - 10/10/2016  Lupron - 04/07/2016    LABS:  06/03/2019 - PSA: 0.2, Test: 234  09/13/2019 - PSA: 0.1, Test: 249    AUA/Epic:2, Pleased          Josué Patterson, CMA

## 2019-09-17 NOTE — PROGRESS NOTES
Patient: Eleanor Diamond MRN: [id number]  SSN: [id number]    YOB: 1955  Age: 62 y.o. Sex: male      DIAGNOSIS:  Adenocarcinoma of the prostate, T2a, Alverda score 4+4 = 8, PSA 4.49    TREATMENT SITE AND DOSE DELIVERED:    1)  He received HDR brachytherapy under the direction of Dr. Rebecca Taylor in [place] on 08/15/16. He also had SpaceOAR hydrogel placed on 08/17/16. Pelvis has received 37.8 Gy of 45 Gy in 25/25 fractions that he completed 10/10/16. HISTORY OF PRESENT ILLNESS: Eleanor Diamond is a 59year old male seen initially by Dr. Carmen Jeffrey on 03/16/16 at the request of Dr. Maria Leach for further discussion of management options for this high risk prostate cancer. He was found to have an elevated PSA of 4.49 by his primary care physician. He was then referred to Dr. Maria Leach for further evaluation. Dr. Maria Leach palpated a 1 cm nodule at the right apex. He has a family history of prostate cancer in his father. Prostate biopsy on 01/04/16 demonstrated a 36.17 g prostate for a PSA density of 0.12. Pathology showed Roxanna score 4+4 = 8 disease in 25% of the tissue from the left lateral base. 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 High-grade PIN was also seen in tissue from the right lateral base. No other biopsy cores contained cancer. MRI of the pelvis on 02/02/16 showed significant heterogeneity throughout the peripheral zone of the prostate. The most convincing focus of T2 signal loss was seen at the left posterior base where a nodule measuring 9 mm was seen. Abnormal signal change was seen in the bilateral seminal vesicles which was felt likely to represent hemorrhage from biopsy. No extracapsular disease was identified. No pelvic lymphadenopathy or other metastatic disease was seen in the pelvis or visualized abdomen. Bone scan on 02/02/16 showed no evidence of bony metastatic disease. He had a thorough discussion with Dr. Maria Leach regarding various treatment options.  He was interested in further discussion of radiation therapy.     INTERVAL HISTORY:  He received a single dose of Eligard on 4/7/16 and completed 6 months of casodex on 10/10/16. Mr Arie Cummings returns 35 months out from completing radiation therapy. He has an AUA score of 2/35 and is \"pleased\" with his urinary function. His urinary function is at his baseline. He continues with nocturia x1. Otherwise denies frequency, urgency, leakage, dysuria or hematuria. He has good force of stream. He is no longer on Flomax and doesn't notice a difference with his urinary function. He has good bowel function with occasional constipation. Had right foot surgery in July and is recovering. Tires easily, but feeling better every day. 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 He continues with ED. He has tried Viagra, Cialis and Trimix in the past with little to no results. He was provided a script for Cialis at our last visit and he believes he is beginning to have some improvement. PHYSICAL EXAM:  VITAL SIGNS: Blood pressure 135/75  Pulse  75  Temperature 98.8   Weight 245.9        ECOG SCORE: 0    GENERAL: The patient is well-developed, ambulatory, alert and in no acute distress. LABS:   09/13/19: PSA 0.1 with testosterone of 249  06/03/19: PSA 0.2 with testosterone of 234  02/27/19: PSA 0.2 with testosterone of 246  08/20/18: PSA 0.2 with testosterone of 215  02/20/18: PSA 0.5 with testosterone of 275  08/15/17: PSA 0.3 with testosterone of 269  05/16/17: PSA 0.4 with testosterone of 161  02/07/17: PSA 0.3 with testosterone of 247  11/11/16: PSA <0.1 with testosterone of <3    ASSESSMENT:  Alia Lay with Adenocarcinoma of the prostate, T2a, Lindale score 4+4 = 8, PSA 4.49 . He received HDR brachytherapy under the direction of Dr. Leni Paredes in [facility] on 08/15/16 then radiation, completed 10/10/16. Mr Arie Cummings is now 35 months after completing radiation therapy.  He is recovering as anticipated from radiation and ADT therapy. He is concerned regarding his ED. He has tried Cialis,  Viagra and trimix with no improvement. His testosterone continues to fluctuate remaining in the low to mid 200s. We have discussed our concern with him trying testosterone supplementation with his high risk prostate cancer. It was agreed to continue with Cialis 10-20mg prn. He is asking to continue his follow up with lab work with our office. PLAN:  1) We discussed ongoing surveillance for his prostate cancer, which would include follow up every 6 months with  PSA/Testosterone months until year 5, then annually with adjustments as indicated. All questions were answered to the best of my ability. RTC 6 months with PSA/testosterone checked prior to visit. 2) ED/Stamina: Cialis 10-20 mg daily - prescription provided   3) I spent 25 minutes in the care of Mr Herrera Lynn today, over 75% of which was in direct counseling and ongoing management of his adenocarcinoma of the prostate and use of testosterone supplementation. All questions were answered to the best of my ability. Gale Carreon NP   September 17, 2019      Portions of this note were copied from prior encounters and reviewed for accuracy, currency, and represent documentation and tasks completed during this encounter. I verify and attest these portions to be unchanged from prior visits.

## 2020-02-19 ENCOUNTER — HOSPITAL ENCOUNTER (OUTPATIENT)
Dept: CT IMAGING | Age: 65
Discharge: HOME | End: 2020-02-19
Attending: NURSE PRACTITIONER

## 2020-02-19 DIAGNOSIS — R31.0 GROSS HEMATURIA: ICD-10-CM

## 2020-02-19 DIAGNOSIS — C61 PROSTATE CANCER (HCC): ICD-10-CM

## 2020-02-19 RX ORDER — SODIUM CHLORIDE 0.9 % (FLUSH) 0.9 %
10 SYRINGE (ML) INJECTION
Status: COMPLETED | OUTPATIENT
Start: 2020-02-19 | End: 2020-02-19

## 2020-02-19 RX ADMIN — Medication 10 ML: at 09:57

## 2020-02-19 NOTE — PROGRESS NOTES
CT showed no reason for the hematuria. A small nodular lesion was noted on the CT. It was unable to characterize this lesion. A CT of the chest is recommended. This was ordered.

## 2020-02-20 NOTE — PROGRESS NOTES
Spoke with patient about test results. Advised them of the treatment plan.  He states he missed the call from radiology scheduling but will call them to schedule the CT of the chest.

## 2020-02-24 ENCOUNTER — HOSPITAL ENCOUNTER (OUTPATIENT)
Dept: CT IMAGING | Age: 65
Discharge: HOME | End: 2020-02-24
Attending: NURSE PRACTITIONER

## 2020-02-24 DIAGNOSIS — R22.2 CHEST MASS: ICD-10-CM

## 2020-02-24 RX ORDER — SODIUM CHLORIDE 0.9 % (FLUSH) 0.9 %
10 SYRINGE (ML) INJECTION
Status: COMPLETED | OUTPATIENT
Start: 2020-02-24 | End: 2020-02-24

## 2020-02-24 RX ADMIN — Medication 10 ML: at 08:50

## 2020-03-10 ENCOUNTER — HOSPITAL ENCOUNTER (OUTPATIENT)
Dept: RADIATION ONCOLOGY | Age: 65
Discharge: HOME | End: 2020-03-10
Payer: OTHER GOVERNMENT

## 2020-03-10 DIAGNOSIS — C61 PROSTATE CANCER (HCC): ICD-10-CM

## 2020-03-10 LAB — PSA SERPL-MCNC: 0.1 NG/ML

## 2020-03-10 PROCEDURE — 36415 COLL VENOUS BLD VENIPUNCTURE: CPT

## 2020-03-10 PROCEDURE — 84153 ASSAY OF PSA TOTAL: CPT

## 2020-03-10 PROCEDURE — 84403 ASSAY OF TOTAL TESTOSTERONE: CPT

## 2020-03-11 LAB — TESTOST SERPL-MCNC: 289 NG/DL (ref 264–916)

## 2020-03-17 ENCOUNTER — APPOINTMENT (OUTPATIENT)
Dept: RADIATION ONCOLOGY | Age: 65
End: 2020-03-17
Payer: OTHER GOVERNMENT

## 2020-03-17 ENCOUNTER — TELEPHONE (OUTPATIENT)
Dept: ONCOLOGY | Age: 65
End: 2020-03-17

## 2020-03-17 DIAGNOSIS — C61 PROSTATE CANCER (HCC): Primary | ICD-10-CM

## 2020-03-17 NOTE — TELEPHONE ENCOUNTER
Appointment canceled due to coronavirus    Called and spoke with Mr Danielito Barclay regarding recent psa result    He reports having mild hematuria. Was seen by Dr. Elkin Matute. States he had a cystoscopy and CT last week. Cystoscopy revealed radiation cystitis and negative Ct. Push fluids. Hematuria resolved at this time. Feels well. Reports no urinary changes at this time. Normal bowel function. Feels well. No complaints. PSA remains unchanged, 0.1 with testosterone of 289. RTC 6 months with psa/testosterone. Will call with appointment.      Owen Donato NP

## 2020-04-02 ENCOUNTER — HOSPITAL ENCOUNTER (OUTPATIENT)
Dept: PET IMAGING | Age: 65
Discharge: HOME | End: 2020-04-02
Payer: OTHER GOVERNMENT

## 2020-04-02 DIAGNOSIS — Z85.46 H/O PROSTATE CANCER: ICD-10-CM

## 2020-04-02 DIAGNOSIS — R91.1 LUNG NODULE: ICD-10-CM

## 2020-04-02 PROCEDURE — 74011636320 HC RX REV CODE- 636/320: Performed by: INTERNAL MEDICINE

## 2020-04-02 RX ORDER — SODIUM CHLORIDE 0.9 % (FLUSH) 0.9 %
10 SYRINGE (ML) INJECTION
Status: COMPLETED | OUTPATIENT
Start: 2020-04-02 | End: 2020-04-02

## 2020-04-02 RX ADMIN — Medication 10 ML: at 09:50

## 2020-04-02 RX ADMIN — DIATRIZOATE MEGLUMINE AND DIATRIZOATE SODIUM 10 ML: 660; 100 LIQUID ORAL; RECTAL at 09:50

## 2020-04-10 NOTE — PROGRESS NOTES
I think this will need ct guided bx but could present at tumor board first- let pt.  Know what we are doing -thanks

## 2020-07-28 ENCOUNTER — HOSPITAL ENCOUNTER (OUTPATIENT)
Dept: CT IMAGING | Age: 65
Discharge: HOME | End: 2020-07-28
Attending: INTERNAL MEDICINE

## 2020-07-28 DIAGNOSIS — R91.1 PULMONARY NODULE: ICD-10-CM

## 2020-08-11 NOTE — PROGRESS NOTES
Ct is stable -need repeat in 6 months- please let him know and schedule ct in 6 months before next visit

## 2020-10-01 ENCOUNTER — HOSPITAL ENCOUNTER (OUTPATIENT)
Dept: RADIATION ONCOLOGY | Age: 65
Discharge: HOME | End: 2020-10-01
Payer: OTHER GOVERNMENT

## 2020-10-01 DIAGNOSIS — C61 PROSTATE CANCER (HCC): ICD-10-CM

## 2020-10-01 LAB — PSA SERPL-MCNC: 0.2 NG/ML

## 2020-10-01 PROCEDURE — 84153 ASSAY OF PSA TOTAL: CPT

## 2020-10-01 PROCEDURE — 36415 COLL VENOUS BLD VENIPUNCTURE: CPT

## 2020-10-01 PROCEDURE — 84403 ASSAY OF TOTAL TESTOSTERONE: CPT

## 2020-10-02 DIAGNOSIS — C61 PROSTATE CANCER (HCC): Primary | ICD-10-CM

## 2020-10-02 LAB — TESTOST SERPL-MCNC: 165 NG/DL (ref 264–916)

## 2020-10-09 RX ORDER — CEFAZOLIN SODIUM/WATER 2 G/20 ML
2 SYRINGE (ML) INTRAVENOUS ONCE
Status: CANCELLED | OUTPATIENT
Start: 2020-10-09 | End: 2020-10-09

## 2021-02-15 ENCOUNTER — HOSPITAL ENCOUNTER (OUTPATIENT)
Dept: CT IMAGING | Age: 66
Discharge: HOME | End: 2021-02-15
Attending: INTERNAL MEDICINE

## 2021-02-15 DIAGNOSIS — R91.1 LUNG NODULE: ICD-10-CM

## 2021-04-15 ENCOUNTER — HOSPITAL ENCOUNTER (OUTPATIENT)
Dept: RADIATION ONCOLOGY | Age: 66
Discharge: HOME | End: 2021-04-15
Payer: OTHER GOVERNMENT

## 2021-04-15 DIAGNOSIS — C61 PROSTATE CANCER (HCC): ICD-10-CM

## 2021-04-15 LAB — PSA SERPL-MCNC: 0.1 NG/ML

## 2021-04-15 PROCEDURE — 84403 ASSAY OF TOTAL TESTOSTERONE: CPT

## 2021-04-15 PROCEDURE — 36415 COLL VENOUS BLD VENIPUNCTURE: CPT

## 2021-04-15 PROCEDURE — 84153 ASSAY OF PSA TOTAL: CPT

## 2021-04-16 LAB
COMMENT, TESC2: NORMAL
TESTOST SERPL-MCNC: 270 NG/DL (ref 264–916)

## 2021-04-21 ENCOUNTER — HOSPITAL ENCOUNTER (OUTPATIENT)
Dept: SURGERY | Age: 66
Discharge: HOME | End: 2021-04-21

## 2021-04-21 NOTE — PERIOP NOTES
Patient was a no show for PAT appointment; instructed to call surgeon's office to reschedule, patient verbalized understanding.

## 2021-04-23 ENCOUNTER — HOSPITAL ENCOUNTER (OUTPATIENT)
Dept: GENERAL RADIOLOGY | Age: 66
Discharge: HOME | End: 2021-04-23
Attending: SURGERY
Payer: OTHER GOVERNMENT

## 2021-04-23 ENCOUNTER — HOSPITAL ENCOUNTER (OUTPATIENT)
Dept: SURGERY | Age: 66
Discharge: HOME | End: 2021-04-23
Payer: OTHER GOVERNMENT

## 2021-04-23 ENCOUNTER — ANESTHESIA EVENT (OUTPATIENT)
Dept: SURGERY | Age: 66
DRG: 165 | End: 2021-04-23
Payer: OTHER GOVERNMENT

## 2021-04-23 VITALS
WEIGHT: 252.9 LBS | DIASTOLIC BLOOD PRESSURE: 87 MMHG | SYSTOLIC BLOOD PRESSURE: 151 MMHG | OXYGEN SATURATION: 100 % | TEMPERATURE: 97.6 F | HEIGHT: 72 IN | HEART RATE: 67 BPM | BODY MASS INDEX: 34.25 KG/M2 | RESPIRATION RATE: 18 BRPM

## 2021-04-23 LAB
ALBUMIN SERPL-MCNC: 3.8 G/DL (ref 3.2–4.6)
ALBUMIN/GLOB SERPL: 1 {RATIO} (ref 1.2–3.5)
ALP SERPL-CCNC: 56 U/L (ref 50–136)
ALT SERPL-CCNC: 27 U/L (ref 12–65)
ANION GAP SERPL CALC-SCNC: 6 MMOL/L (ref 7–16)
APPEARANCE UR: CLEAR
AST SERPL-CCNC: 24 U/L (ref 15–37)
BASOPHILS # BLD: 0 K/UL (ref 0–0.2)
BASOPHILS NFR BLD: 1 % (ref 0–2)
BILIRUB SERPL-MCNC: 0.4 MG/DL (ref 0.2–1.1)
BILIRUB UR QL: NEGATIVE
BUN SERPL-MCNC: 19 MG/DL (ref 8–23)
CALCIUM SERPL-MCNC: 9.8 MG/DL (ref 8.3–10.4)
CHLORIDE SERPL-SCNC: 106 MMOL/L (ref 98–107)
CO2 SERPL-SCNC: 26 MMOL/L (ref 21–32)
COLOR UR: YELLOW
CREAT SERPL-MCNC: 1.21 MG/DL (ref 0.8–1.5)
DIFFERENTIAL METHOD BLD: ABNORMAL
EOSINOPHIL # BLD: 0.2 K/UL (ref 0–0.8)
EOSINOPHIL NFR BLD: 2 % (ref 0.5–7.8)
ERYTHROCYTE [DISTWIDTH] IN BLOOD BY AUTOMATED COUNT: 13.4 % (ref 11.9–14.6)
GLOBULIN SER CALC-MCNC: 3.9 G/DL (ref 2.3–3.5)
GLUCOSE SERPL-MCNC: 94 MG/DL (ref 65–100)
GLUCOSE UR STRIP.AUTO-MCNC: NEGATIVE MG/DL
HCT VFR BLD AUTO: 42.3 % (ref 41.1–50.3)
HGB BLD-MCNC: 14 G/DL (ref 13.6–17.2)
HGB UR QL STRIP: NEGATIVE
IMM GRANULOCYTES # BLD AUTO: 0 K/UL (ref 0–0.5)
IMM GRANULOCYTES NFR BLD AUTO: 0 % (ref 0–5)
INR PPP: 0.9
KETONES UR QL STRIP.AUTO: NEGATIVE MG/DL
LEUKOCYTE ESTERASE UR QL STRIP.AUTO: NEGATIVE
LYMPHOCYTES # BLD: 1.8 K/UL (ref 0.5–4.6)
LYMPHOCYTES NFR BLD: 29 % (ref 13–44)
MCH RBC QN AUTO: 28.7 PG (ref 26.1–32.9)
MCHC RBC AUTO-ENTMCNC: 33.1 G/DL (ref 31.4–35)
MCV RBC AUTO: 86.7 FL (ref 79.6–97.8)
MONOCYTES # BLD: 0.6 K/UL (ref 0.1–1.3)
MONOCYTES NFR BLD: 9 % (ref 4–12)
NEUTS SEG # BLD: 3.6 K/UL (ref 1.7–8.2)
NEUTS SEG NFR BLD: 59 % (ref 43–78)
NITRITE UR QL STRIP.AUTO: NEGATIVE
NRBC # BLD: 0 K/UL (ref 0–0.2)
PH UR STRIP: 7.5 [PH] (ref 5–9)
PLATELET # BLD AUTO: 334 K/UL (ref 150–450)
PMV BLD AUTO: 9.3 FL (ref 9.4–12.3)
POTASSIUM SERPL-SCNC: 4.1 MMOL/L (ref 3.5–5.1)
PROT SERPL-MCNC: 7.7 G/DL (ref 6.3–8.2)
PROT UR STRIP-MCNC: NEGATIVE MG/DL
PROTHROMBIN TIME: 12.8 SEC (ref 12.5–14.7)
RBC # BLD AUTO: 4.88 M/UL (ref 4.23–5.6)
SODIUM SERPL-SCNC: 138 MMOL/L (ref 138–145)
SP GR UR REFRACTOMETRY: 1.01 (ref 1–1.02)
UROBILINOGEN UR QL STRIP.AUTO: 0.2 EU/DL (ref 0.2–1)
WBC # BLD AUTO: 6.1 K/UL (ref 4.3–11.1)

## 2021-04-23 PROCEDURE — 81003 URINALYSIS AUTO W/O SCOPE: CPT

## 2021-04-23 PROCEDURE — 71046 X-RAY EXAM CHEST 2 VIEWS: CPT

## 2021-04-23 PROCEDURE — 85025 COMPLETE CBC W/AUTO DIFF WBC: CPT

## 2021-04-23 PROCEDURE — 85610 PROTHROMBIN TIME: CPT

## 2021-04-23 PROCEDURE — 80053 COMPREHEN METABOLIC PANEL: CPT

## 2021-04-23 RX ORDER — DOXYCYCLINE 100 MG/1
100 CAPSULE ORAL 2 TIMES DAILY
COMMUNITY
Start: 2021-04-20 | End: 2021-04-30

## 2021-04-23 RX ORDER — CLOBETASOL PROPIONATE 0.5 MG/G
CREAM TOPICAL 2 TIMES DAILY
COMMUNITY
Start: 2021-03-09

## 2021-04-23 RX ORDER — FLUOCINONIDE 0.5 MG/G
OINTMENT TOPICAL
COMMUNITY
Start: 2021-04-08

## 2021-04-23 RX ORDER — HYDROXYZINE 25 MG/1
TABLET, FILM COATED ORAL
COMMUNITY
Start: 2021-04-08

## 2021-04-23 NOTE — PERIOP NOTES
Recent Results (from the past 12 hour(s))   URINALYSIS W/ RFLX MICROSCOPIC    Collection Time: 04/23/21 11:44 AM   Result Value Ref Range    Color YELLOW      Appearance CLEAR      Specific gravity 1.015 1.001 - 1.023      pH (UA) 7.5 5.0 - 9.0      Protein Negative NEG mg/dL    Glucose Negative mg/dL    Ketone Negative NEG mg/dL    Bilirubin Negative NEG      Blood Negative NEG      Urobilinogen 0.2 0.2 - 1.0 EU/dL    Nitrites Negative NEG      Leukocyte Esterase Negative NEG     CBC WITH AUTOMATED DIFF    Collection Time: 04/23/21 11:46 AM   Result Value Ref Range    WBC 6.1 4.3 - 11.1 K/uL    RBC 4.88 4.23 - 5.6 M/uL    HGB 14.0 13.6 - 17.2 g/dL    HCT 42.3 41.1 - 50.3 %    MCV 86.7 79.6 - 97.8 FL    MCH 28.7 26.1 - 32.9 PG    MCHC 33.1 31.4 - 35.0 g/dL    RDW 13.4 11.9 - 14.6 %    PLATELET 204 694 - 634 K/uL    MPV 9.3 (L) 9.4 - 12.3 FL    ABSOLUTE NRBC 0.00 0.0 - 0.2 K/uL    DF AUTOMATED      NEUTROPHILS 59 43 - 78 %    LYMPHOCYTES 29 13 - 44 %    MONOCYTES 9 4.0 - 12.0 %    EOSINOPHILS 2 0.5 - 7.8 %    BASOPHILS 1 0.0 - 2.0 %    IMMATURE GRANULOCYTES 0 0.0 - 5.0 %    ABS. NEUTROPHILS 3.6 1.7 - 8.2 K/UL    ABS. LYMPHOCYTES 1.8 0.5 - 4.6 K/UL    ABS. MONOCYTES 0.6 0.1 - 1.3 K/UL    ABS. EOSINOPHILS 0.2 0.0 - 0.8 K/UL    ABS. BASOPHILS 0.0 0.0 - 0.2 K/UL    ABS. IMM. GRANS. 0.0 0.0 - 0.5 K/UL   METABOLIC PANEL, COMPREHENSIVE    Collection Time: 04/23/21 11:46 AM   Result Value Ref Range    Sodium 138 138 - 145 mmol/L    Potassium 4.1 3.5 - 5.1 mmol/L    Chloride 106 98 - 107 mmol/L    CO2 26 21 - 32 mmol/L    Anion gap 6 (L) 7 - 16 mmol/L    Glucose 94 65 - 100 mg/dL    BUN 19 8 - 23 MG/DL    Creatinine 1.21 0.8 - 1.5 MG/DL    GFR est AA >60 >60 ml/min/1.73m2    GFR est non-AA >60 >60 ml/min/1.73m2    Calcium 9.8 8.3 - 10.4 MG/DL    Bilirubin, total 0.4 0.2 - 1.1 MG/DL    ALT (SGPT) 27 12 - 65 U/L    AST (SGOT) 24 15 - 37 U/L    Alk.  phosphatase 56 50 - 136 U/L    Protein, total 7.7 6.3 - 8.2 g/dL    Albumin 3.8 3.2 - 4.6 g/dL    Globulin 3.9 (H) 2.3 - 3.5 g/dL    A-G Ratio 1.0 (L) 1.2 - 3.5     PROTHROMBIN TIME + INR    Collection Time: 04/23/21 11:46 AM   Result Value Ref Range    Prothrombin time 12.8 12.5 - 14.7 sec    INR 0.9       Patient refused to have EKG repeated. Copy of EKG from 3/17/21 office visit with Dr. Efrem Fierro received and to be sent to pre-op for DOS. CXR: poor radiographic visualization of medial right lower lobe. Pulmonary nodule with no acute cardiopulmonary disease identified. Reviewed, within defined limits of anesthesia protocol.   Routed to surgeon

## 2021-04-23 NOTE — ANESTHESIA PREPROCEDURE EVALUATION
Relevant Problems   No relevant active problems       Anesthetic History               Review of Systems / Medical History  Patient summary reviewed and pertinent labs reviewed    Pulmonary          Undiagnosed apnea and smoker (Former)      Comments: R lung mass   Neuro/Psych              Cardiovascular    Hypertension          Hyperlipidemia    Exercise tolerance: >4 METS     GI/Hepatic/Renal                Endo/Other        Obesity and cancer (h/o prostrate CA)     Other Findings   Comments: Hb 14         Physical Exam    Airway  Mallampati: II  TM Distance: 4 - 6 cm  Neck ROM: normal range of motion   Mouth opening: Normal    Comments: Large tongue Cardiovascular  Regular rate and rhythm,  S1 and S2 normal,  no murmur, click, rub, or gallop  Rhythm: regular  Rate: normal      Pertinent negatives: No murmur   Dental  No notable dental hx       Pulmonary  Breath sounds clear to auscultation               Abdominal  GI exam deferred       Other Findings            Anesthetic Plan    ASA: 3  Anesthesia type: general  Glidescope  Monitoring Plan: Arterial line  Post-op pain plan if not by surgeon: peripheral nerve block single      Anesthetic plan and risks discussed with: Patient and Spouse

## 2021-04-23 NOTE — PERIOP NOTES
PLEASE CONTINUE TAKING ALL PRESCRIPTION MEDICATIONS UP TO THE DAY OF SURGERY UNLESS OTHERWISE DIRECTED BELOW. DISCONTINUE all vitamins and supplements 7 days prior to surgery. DISCONTINUE Non-Steriodal Anti-Inflammatory (NSAIDS) such as Advil and Aleve 5 days prior to surgery. Home Medications to take  the day of surgery    simvastatin - zocor   doxycycline        Home Medications   to Hold   Vitamins, Supplements, and Herbals. Non-Steriodal Anti-Inflammatory (NSAIDS) such as Advil and Aleve. Hold amlodipine-benazepril the morning of surgery     Comments          *Visitor policy of 1 visitor per patient discussed. Please do not bring home medications with you on the day of surgery unless otherwise directed by your nurse. If you are instructed to bring home medications, please give them to your nurse as they will be administered by the nursing staff. If you have any questions, please call [facility] [phone] or [facility] [phone]. A copy of this note was provided to the patient for reference.

## 2021-04-23 NOTE — PERIOP NOTES
Patient confirms name and . Order to obtain consent  found in EHR and  matches case posting. Pt verbalizes understanding of 1 visitor policy. Type 3 surgery, walk-in PAT assessment complete. Labs per surgeon: CBC, CMP, PT/INR, UA  Labs per anesthesia protocol: CBC, BMP included in surgeon's orders  EKG: 3/17/21 copy obtained. Patient refused a repeat EKG. Copy to be sent to pre-op the DOS  Glucose: not indicated  Dr. Yonis Caba in to see patient, reviewed chart and 3/17/21 EKG. Patient ID bracelet applied, patient given order for CXR and instructed to have the CXR completed prior to leaving the hospital.  Patient verbalized understanding. Patient states that he has cellulitis in his left leg, was seen in the ER 4/10/21 and started on doxycycline, completed 14 days and Dr. Miracle Monroy continued the doxycyline, 21 office note indicates cellulitis is improving. Dr. Megan Velasquez notified of the cellulitis and continuing doxycycline. No orders received    Covid test completed 21. Result  noted in EMR \" not detected\". Medications discussed today. Antibacterial soap and Hibiclens and instructions given per hospital policy. Use in shower the night before surgery and on the morning of surgery. Patient provided with and instructed on educational handouts including Guide to Surgery, Pain Management, Hand Hygiene, Blood Transfusion Education, and [place] [facility] Brochure. Medication instructions provided per [facility] medication instruction note. Patient provided with a copy. Patient answered medical/surgical history questions at their best of ability. All prior to admission medications documented in [facility]. Patient was distracted and texting on his phone during most of  the appoitment      Patient instructed on the following:  Arrive at main Entrance, time of arrival to be called the day before by 1700  NPO after midnight including gum, mints, and ice chips.   Responsible adult must drive patient to the hospital, stay during surgery, and patient will require supervision 24 hours after anesthesia. If admitted to hospital, current visiting policy. Leave all valuables (money and jewelry) at home but bring insurance card and ID on DOS. Do not wear make-up, nail polish, lotions, cologne, perfumes, powders, or oil on skin. Patient teach back successful and patient demonstrates knowledge of instruction.

## 2021-04-28 ENCOUNTER — ANESTHESIA (OUTPATIENT)
Dept: SURGERY | Age: 66
DRG: 165 | End: 2021-04-28
Payer: OTHER GOVERNMENT

## 2021-04-28 ENCOUNTER — APPOINTMENT (OUTPATIENT)
Dept: GENERAL RADIOLOGY | Age: 66
DRG: 165 | End: 2021-04-28
Attending: NURSE PRACTITIONER
Payer: OTHER GOVERNMENT

## 2021-04-28 ENCOUNTER — HOSPITAL ENCOUNTER (INPATIENT)
Age: 66
LOS: 2 days | Discharge: HOME | DRG: 165 | End: 2021-04-30
Attending: SURGERY | Admitting: SURGERY
Payer: OTHER GOVERNMENT

## 2021-04-28 DIAGNOSIS — C34.91 BRONCHOGENIC CANCER OF RIGHT LUNG (HCC): Primary | ICD-10-CM

## 2021-04-28 PROBLEM — R91.8 LUNG MASS: Status: ACTIVE | Noted: 2021-04-28

## 2021-04-28 LAB
ABO + RH BLD: NORMAL
ARTERIAL PATENCY WRIST A: NO
ARTERIAL PATENCY WRIST A: POSITIVE
BASE DEFICIT BLD-SCNC: 1.3 MMOL/L
BASE DEFICIT BLD-SCNC: 3 MMOL/L
BDY SITE: ABNORMAL
BDY SITE: ABNORMAL
BLOOD GROUP ANTIBODIES SERPL: NORMAL
CA-I BLD-MCNC: 1.18 MMOL/L (ref 1.12–1.32)
CA-I BLD-MCNC: 1.27 MMOL/L (ref 1.12–1.32)
CO2 BLD-SCNC: 25 MMOL/L (ref 13–23)
COLLECT TIME,HTIME: 1048
FIO2 ON VENT: 36 %
FLOW RATE ISTAT,IFRATE: 8 L/MIN
GAS FLOW.O2 O2 DELIVERY SYS: ABNORMAL L/MIN
GAS FLOW.O2 O2 DELIVERY SYS: ABNORMAL L/MIN
GLUCOSE BLD STRIP.AUTO-MCNC: 181 MG/DL (ref 65–100)
GLUCOSE BLD STRIP.AUTO-MCNC: 189 MG/DL (ref 65–100)
HCO3 BLD-SCNC: 23.9 MMOL/L (ref 22–26)
HCO3 BLD-SCNC: 24.5 MMOL/L (ref 22–26)
PCO2 BLD: 44.2 MMHG (ref 35–45)
PCO2 BLD: 49.6 MMHG (ref 35–45)
PH BLD: 7.29 [PH] (ref 7.35–7.45)
PH BLD: 7.35 [PH] (ref 7.35–7.45)
PO2 BLD: 72 MMHG (ref 75–100)
PO2 BLD: 82 MMHG (ref 75–100)
POTASSIUM BLD-SCNC: 4.1 MMOL/L (ref 3.5–5.1)
POTASSIUM BLD-SCNC: 4.5 MMOL/L (ref 3.5–5.1)
SAO2 % BLD: 94 %
SAO2 % BLD: 94 % (ref 95–98)
SERVICE CMNT-IMP: ABNORMAL
SODIUM BLD-SCNC: 137 MMOL/L (ref 136–145)
SODIUM BLD-SCNC: 138 MMOL/L (ref 136–145)
SPECIMEN EXP DATE BLD: NORMAL
SPECIMEN SITE: ABNORMAL
SPECIMEN TYPE: ABNORMAL

## 2021-04-28 PROCEDURE — 77030040922 HC BLNKT HYPOTHRM STRY -A: Performed by: NURSE ANESTHETIST, CERTIFIED REGISTERED

## 2021-04-28 PROCEDURE — 74011250636 HC RX REV CODE- 250/636: Performed by: NURSE ANESTHETIST, CERTIFIED REGISTERED

## 2021-04-28 PROCEDURE — 74011250637 HC RX REV CODE- 250/637: Performed by: ANESTHESIOLOGY

## 2021-04-28 PROCEDURE — 77030034850: Performed by: SURGERY

## 2021-04-28 PROCEDURE — 77030012770 HC TRCR OPT FX AMR -B: Performed by: SURGERY

## 2021-04-28 PROCEDURE — 77030040361 HC SLV COMPR DVT MDII -B: Performed by: SURGERY

## 2021-04-28 PROCEDURE — 76010010054 HC POST OP PAIN BLOCK: Performed by: SURGERY

## 2021-04-28 PROCEDURE — 74011250636 HC RX REV CODE- 250/636: Performed by: ANESTHESIOLOGY

## 2021-04-28 PROCEDURE — 77010033678 HC OXYGEN DAILY

## 2021-04-28 PROCEDURE — 74011000250 HC RX REV CODE- 250: Performed by: NURSE PRACTITIONER

## 2021-04-28 PROCEDURE — 32668 THORACOSCOPY W/W RESECT DIAG: CPT | Performed by: NURSE PRACTITIONER

## 2021-04-28 PROCEDURE — 77030008756 HC TU IRR SUC STRY -B: Performed by: SURGERY

## 2021-04-28 PROCEDURE — 77030018673: Performed by: SURGERY

## 2021-04-28 PROCEDURE — 77030013794 HC KT TRNSDUC BLD EDWD -B: Performed by: NURSE ANESTHETIST, CERTIFIED REGISTERED

## 2021-04-28 PROCEDURE — 77030002996 HC SUT SLK J&J -A: Performed by: SURGERY

## 2021-04-28 PROCEDURE — 76060000038 HC ANESTHESIA 3.5 TO 4 HR: Performed by: SURGERY

## 2021-04-28 PROCEDURE — 2709999900 HC NON-CHARGEABLE SUPPLY: Performed by: SURGERY

## 2021-04-28 PROCEDURE — 32663 THORACOSCOPY W/LOBECTOMY: CPT | Performed by: SURGERY

## 2021-04-28 PROCEDURE — 77030019908 HC STETH ESOPH SIMS -A: Performed by: NURSE ANESTHETIST, CERTIFIED REGISTERED

## 2021-04-28 PROCEDURE — 77030008606 HC TRCR ENDOSC KII AMR -B: Performed by: SURGERY

## 2021-04-28 PROCEDURE — 74011000258 HC RX REV CODE- 258: Performed by: NURSE PRACTITIONER

## 2021-04-28 PROCEDURE — 76210000017 HC OR PH I REC 1.5 TO 2 HR: Performed by: SURGERY

## 2021-04-28 PROCEDURE — 88307 TISSUE EXAM BY PATHOLOGIST: CPT

## 2021-04-28 PROCEDURE — C2618 PROBE/NEEDLE, CRYO: HCPCS | Performed by: SURGERY

## 2021-04-28 PROCEDURE — 77030006674 HC BLD MYRIN GYRS -B: Performed by: SURGERY

## 2021-04-28 PROCEDURE — 65270000029 HC RM PRIVATE

## 2021-04-28 PROCEDURE — 32674 THORACOSCOPY LYMPH NODE EXC: CPT | Performed by: NURSE PRACTITIONER

## 2021-04-28 PROCEDURE — 77030016151 HC PROTCTR LNS DFOG COVD -B: Performed by: SURGERY

## 2021-04-28 PROCEDURE — 86901 BLOOD TYPING SEROLOGIC RH(D): CPT

## 2021-04-28 PROCEDURE — 32663 THORACOSCOPY W/LOBECTOMY: CPT | Performed by: NURSE PRACTITIONER

## 2021-04-28 PROCEDURE — 77030009850 HC PCH ENDOSC SPEC COOK -B: Performed by: SURGERY

## 2021-04-28 PROCEDURE — 32674 THORACOSCOPY LYMPH NODE EXC: CPT | Performed by: SURGERY

## 2021-04-28 PROCEDURE — 71045 X-RAY EXAM CHEST 1 VIEW: CPT

## 2021-04-28 PROCEDURE — 88305 TISSUE EXAM BY PATHOLOGIST: CPT

## 2021-04-28 PROCEDURE — 77030008518 HC TBNG INSUF ENDO STRY -B: Performed by: SURGERY

## 2021-04-28 PROCEDURE — 77030031139 HC SUT VCRL2 J&J -A: Performed by: SURGERY

## 2021-04-28 PROCEDURE — 77030005401 HC CATH RAD ARRO -A: Performed by: NURSE ANESTHETIST, CERTIFIED REGISTERED

## 2021-04-28 PROCEDURE — 77030020829: Performed by: SURGERY

## 2021-04-28 PROCEDURE — 77030020407 HC IV BLD WRMR ST 3M -A: Performed by: NURSE ANESTHETIST, CERTIFIED REGISTERED

## 2021-04-28 PROCEDURE — 88331 PATH CONSLTJ SURG 1 BLK 1SPC: CPT

## 2021-04-28 PROCEDURE — 77030003602 HC NDL NRV BLK BBMI -B: Performed by: ANESTHESIOLOGY

## 2021-04-28 PROCEDURE — 2709999900 HC NON-CHARGEABLE SUPPLY

## 2021-04-28 PROCEDURE — 77030021678 HC GLIDESCP STAT DISP VERT -B: Performed by: NURSE ANESTHETIST, CERTIFIED REGISTERED

## 2021-04-28 PROCEDURE — 0BBF4ZZ EXCISION OF RIGHT LOWER LUNG LOBE, PERCUTANEOUS ENDOSCOPIC APPROACH: ICD-10-PCS | Performed by: SURGERY

## 2021-04-28 PROCEDURE — 32668 THORACOSCOPY W/W RESECT DIAG: CPT | Performed by: SURGERY

## 2021-04-28 PROCEDURE — 74011250636 HC RX REV CODE- 250/636: Performed by: NURSE PRACTITIONER

## 2021-04-28 PROCEDURE — 01584ZZ DESTRUCTION OF THORACIC NERVE, PERCUTANEOUS ENDOSCOPIC APPROACH: ICD-10-PCS | Performed by: SURGERY

## 2021-04-28 PROCEDURE — 0BTF4ZZ RESECTION OF RIGHT LOWER LUNG LOBE, PERCUTANEOUS ENDOSCOPIC APPROACH: ICD-10-PCS | Performed by: SURGERY

## 2021-04-28 PROCEDURE — 84295 ASSAY OF SERUM SODIUM: CPT

## 2021-04-28 PROCEDURE — 77030008671 HC TU ENDO/BRNC CUF COVD -B: Performed by: NURSE ANESTHETIST, CERTIFIED REGISTERED

## 2021-04-28 PROCEDURE — 77030027876 HC STPLR ENDOSC FLX PWR J&J -G1: Performed by: SURGERY

## 2021-04-28 PROCEDURE — 76942 ECHO GUIDE FOR BIOPSY: CPT | Performed by: SURGERY

## 2021-04-28 PROCEDURE — 07T74ZZ RESECTION OF THORAX LYMPHATIC, PERCUTANEOUS ENDOSCOPIC APPROACH: ICD-10-PCS | Performed by: SURGERY

## 2021-04-28 PROCEDURE — 77030000038 HC TIP SCIS LAPSCP SURI -B: Performed by: SURGERY

## 2021-04-28 PROCEDURE — 94640 AIRWAY INHALATION TREATMENT: CPT

## 2021-04-28 PROCEDURE — C1729 CATH, DRAINAGE: HCPCS | Performed by: SURGERY

## 2021-04-28 PROCEDURE — 77030012390 HC DRN CHST BTL GTNG -B: Performed by: SURGERY

## 2021-04-28 PROCEDURE — 77030013292 HC BOWL MX PRSM J&J -A: Performed by: NURSE ANESTHETIST, CERTIFIED REGISTERED

## 2021-04-28 PROCEDURE — 74011250637 HC RX REV CODE- 250/637: Performed by: NURSE PRACTITIONER

## 2021-04-28 PROCEDURE — 32100 EXPLORATION OF CHEST: CPT | Performed by: SURGERY

## 2021-04-28 PROCEDURE — 77030009968 HC RELD STPLR ENDOSC J&J -D: Performed by: SURGERY

## 2021-04-28 PROCEDURE — 77030037378 HC BRONCHOSCOPE DISP TRAN -D: Performed by: NURSE ANESTHETIST, CERTIFIED REGISTERED

## 2021-04-28 PROCEDURE — 74011250636 HC RX REV CODE- 250/636: Performed by: SURGERY

## 2021-04-28 PROCEDURE — 82803 BLOOD GASES ANY COMBINATION: CPT

## 2021-04-28 PROCEDURE — 76010000173 HC OR TIME 3 TO 3.5 HR INTENSV-TIER 1: Performed by: SURGERY

## 2021-04-28 PROCEDURE — 77030038079 HC RELD STPLR ENDOSC J&J -G: Performed by: SURGERY

## 2021-04-28 PROCEDURE — 74011000250 HC RX REV CODE- 250: Performed by: NURSE ANESTHETIST, CERTIFIED REGISTERED

## 2021-04-28 RX ORDER — NEOSTIGMINE METHYLSULFATE 1 MG/ML
INJECTION, SOLUTION INTRAVENOUS AS NEEDED
Status: DISCONTINUED | OUTPATIENT
Start: 2021-04-28 | End: 2021-04-28 | Stop reason: HOSPADM

## 2021-04-28 RX ORDER — MIDAZOLAM HYDROCHLORIDE 1 MG/ML
2 INJECTION, SOLUTION INTRAMUSCULAR; INTRAVENOUS ONCE
Status: COMPLETED | OUTPATIENT
Start: 2021-04-28 | End: 2021-04-28

## 2021-04-28 RX ORDER — DIPHENHYDRAMINE HYDROCHLORIDE 50 MG/ML
12.5 INJECTION, SOLUTION INTRAMUSCULAR; INTRAVENOUS
Status: DISCONTINUED | OUTPATIENT
Start: 2021-04-28 | End: 2021-04-28 | Stop reason: HOSPADM

## 2021-04-28 RX ORDER — AMOXICILLIN 250 MG
2 CAPSULE ORAL 2 TIMES DAILY
Status: DISCONTINUED | OUTPATIENT
Start: 2021-04-29 | End: 2021-04-30 | Stop reason: HOSPADM

## 2021-04-28 RX ORDER — PROMETHAZINE HYDROCHLORIDE 25 MG/1
12.5 TABLET ORAL
Status: DISCONTINUED | OUTPATIENT
Start: 2021-04-28 | End: 2021-04-30 | Stop reason: HOSPADM

## 2021-04-28 RX ORDER — PANTOPRAZOLE SODIUM 40 MG/1
40 TABLET, DELAYED RELEASE ORAL
Status: DISCONTINUED | OUTPATIENT
Start: 2021-04-29 | End: 2021-04-30 | Stop reason: HOSPADM

## 2021-04-28 RX ORDER — GABAPENTIN 300 MG/1
300 CAPSULE ORAL 3 TIMES DAILY
Status: DISCONTINUED | OUTPATIENT
Start: 2021-04-28 | End: 2021-04-30 | Stop reason: HOSPADM

## 2021-04-28 RX ORDER — GLYCOPYRROLATE 0.2 MG/ML
INJECTION INTRAMUSCULAR; INTRAVENOUS AS NEEDED
Status: DISCONTINUED | OUTPATIENT
Start: 2021-04-28 | End: 2021-04-28 | Stop reason: HOSPADM

## 2021-04-28 RX ORDER — SODIUM CHLORIDE, SODIUM LACTATE, POTASSIUM CHLORIDE, CALCIUM CHLORIDE 600; 310; 30; 20 MG/100ML; MG/100ML; MG/100ML; MG/100ML
75 INJECTION, SOLUTION INTRAVENOUS CONTINUOUS
Status: DISCONTINUED | OUTPATIENT
Start: 2021-04-28 | End: 2021-04-28 | Stop reason: HOSPADM

## 2021-04-28 RX ORDER — FENTANYL CITRATE 50 UG/ML
100 INJECTION, SOLUTION INTRAMUSCULAR; INTRAVENOUS AS NEEDED
Status: COMPLETED | OUTPATIENT
Start: 2021-04-28 | End: 2021-04-28

## 2021-04-28 RX ORDER — HYDROMORPHONE HYDROCHLORIDE 1 MG/ML
0.5 INJECTION, SOLUTION INTRAMUSCULAR; INTRAVENOUS; SUBCUTANEOUS
Status: DISCONTINUED | OUTPATIENT
Start: 2021-04-28 | End: 2021-04-28 | Stop reason: HOSPADM

## 2021-04-28 RX ORDER — IPRATROPIUM BROMIDE AND ALBUTEROL SULFATE 2.5; .5 MG/3ML; MG/3ML
3 SOLUTION RESPIRATORY (INHALATION)
Status: DISCONTINUED | OUTPATIENT
Start: 2021-04-28 | End: 2021-04-29

## 2021-04-28 RX ORDER — FLUMAZENIL 0.1 MG/ML
0.2 INJECTION INTRAVENOUS
Status: DISCONTINUED | OUTPATIENT
Start: 2021-04-28 | End: 2021-04-28 | Stop reason: HOSPADM

## 2021-04-28 RX ORDER — SODIUM CHLORIDE 0.9 % (FLUSH) 0.9 %
5-40 SYRINGE (ML) INJECTION AS NEEDED
Status: DISCONTINUED | OUTPATIENT
Start: 2021-04-28 | End: 2021-04-28 | Stop reason: HOSPADM

## 2021-04-28 RX ORDER — DEXAMETHASONE SODIUM PHOSPHATE 4 MG/ML
INJECTION, SOLUTION INTRA-ARTICULAR; INTRALESIONAL; INTRAMUSCULAR; INTRAVENOUS; SOFT TISSUE
Status: COMPLETED | OUTPATIENT
Start: 2021-04-28 | End: 2021-04-28

## 2021-04-28 RX ORDER — ROPIVACAINE HYDROCHLORIDE 5 MG/ML
INJECTION, SOLUTION EPIDURAL; INFILTRATION; PERINEURAL
Status: COMPLETED | OUTPATIENT
Start: 2021-04-28 | End: 2021-04-28

## 2021-04-28 RX ORDER — ENOXAPARIN SODIUM 100 MG/ML
40 INJECTION SUBCUTANEOUS EVERY 24 HOURS
Status: DISCONTINUED | OUTPATIENT
Start: 2021-04-28 | End: 2021-04-30 | Stop reason: HOSPADM

## 2021-04-28 RX ORDER — EPHEDRINE SULFATE/0.9% NACL/PF 50 MG/5 ML
SYRINGE (ML) INTRAVENOUS AS NEEDED
Status: DISCONTINUED | OUTPATIENT
Start: 2021-04-28 | End: 2021-04-28 | Stop reason: HOSPADM

## 2021-04-28 RX ORDER — HYDROMORPHONE HYDROCHLORIDE 1 MG/ML
1 INJECTION, SOLUTION INTRAMUSCULAR; INTRAVENOUS; SUBCUTANEOUS
Status: DISCONTINUED | OUTPATIENT
Start: 2021-04-28 | End: 2021-04-30 | Stop reason: HOSPADM

## 2021-04-28 RX ORDER — ROCURONIUM BROMIDE 10 MG/ML
INJECTION, SOLUTION INTRAVENOUS AS NEEDED
Status: DISCONTINUED | OUTPATIENT
Start: 2021-04-28 | End: 2021-04-28 | Stop reason: HOSPADM

## 2021-04-28 RX ORDER — GUAIFENESIN 600 MG/1
1200 TABLET, EXTENDED RELEASE ORAL EVERY 12 HOURS
Status: DISCONTINUED | OUTPATIENT
Start: 2021-04-28 | End: 2021-04-30 | Stop reason: HOSPADM

## 2021-04-28 RX ORDER — ACETAMINOPHEN 500 MG
1000 TABLET ORAL ONCE
Status: COMPLETED | OUTPATIENT
Start: 2021-04-28 | End: 2021-04-28

## 2021-04-28 RX ORDER — SUCCINYLCHOLINE CHLORIDE 20 MG/ML
INJECTION INTRAMUSCULAR; INTRAVENOUS AS NEEDED
Status: DISCONTINUED | OUTPATIENT
Start: 2021-04-28 | End: 2021-04-28 | Stop reason: HOSPADM

## 2021-04-28 RX ORDER — SODIUM CHLORIDE, SODIUM LACTATE, POTASSIUM CHLORIDE, CALCIUM CHLORIDE 600; 310; 30; 20 MG/100ML; MG/100ML; MG/100ML; MG/100ML
25 INJECTION, SOLUTION INTRAVENOUS CONTINUOUS
Status: DISCONTINUED | OUTPATIENT
Start: 2021-04-28 | End: 2021-04-29

## 2021-04-28 RX ORDER — SODIUM CHLORIDE 0.9 % (FLUSH) 0.9 %
5-40 SYRINGE (ML) INJECTION EVERY 8 HOURS
Status: DISCONTINUED | OUTPATIENT
Start: 2021-04-28 | End: 2021-04-30 | Stop reason: HOSPADM

## 2021-04-28 RX ORDER — FENTANYL CITRATE 50 UG/ML
INJECTION, SOLUTION INTRAMUSCULAR; INTRAVENOUS AS NEEDED
Status: DISCONTINUED | OUTPATIENT
Start: 2021-04-28 | End: 2021-04-28 | Stop reason: HOSPADM

## 2021-04-28 RX ORDER — AMOXICILLIN 250 MG
1 CAPSULE ORAL 2 TIMES DAILY
Status: DISCONTINUED | OUTPATIENT
Start: 2021-04-28 | End: 2021-04-28

## 2021-04-28 RX ORDER — HYDROMORPHONE HYDROCHLORIDE 2 MG/ML
INJECTION, SOLUTION INTRAMUSCULAR; INTRAVENOUS; SUBCUTANEOUS AS NEEDED
Status: DISCONTINUED | OUTPATIENT
Start: 2021-04-28 | End: 2021-04-28 | Stop reason: HOSPADM

## 2021-04-28 RX ORDER — OXYCODONE HYDROCHLORIDE 5 MG/1
5 TABLET ORAL
Status: DISCONTINUED | OUTPATIENT
Start: 2021-04-28 | End: 2021-04-28 | Stop reason: HOSPADM

## 2021-04-28 RX ORDER — NALOXONE HYDROCHLORIDE 0.4 MG/ML
0.1 INJECTION, SOLUTION INTRAMUSCULAR; INTRAVENOUS; SUBCUTANEOUS AS NEEDED
Status: DISCONTINUED | OUTPATIENT
Start: 2021-04-28 | End: 2021-04-28 | Stop reason: HOSPADM

## 2021-04-28 RX ORDER — HYDROCODONE BITARTRATE AND ACETAMINOPHEN 5; 325 MG/1; MG/1
1-2 TABLET ORAL
Status: DISCONTINUED | OUTPATIENT
Start: 2021-04-28 | End: 2021-04-30 | Stop reason: HOSPADM

## 2021-04-28 RX ORDER — SODIUM CHLORIDE, SODIUM LACTATE, POTASSIUM CHLORIDE, CALCIUM CHLORIDE 600; 310; 30; 20 MG/100ML; MG/100ML; MG/100ML; MG/100ML
INJECTION, SOLUTION INTRAVENOUS
Status: DISCONTINUED | OUTPATIENT
Start: 2021-04-28 | End: 2021-04-28 | Stop reason: HOSPADM

## 2021-04-28 RX ORDER — CEFAZOLIN SODIUM/WATER 2 G/20 ML
2 SYRINGE (ML) INTRAVENOUS ONCE
Status: COMPLETED | OUTPATIENT
Start: 2021-04-28 | End: 2021-04-28

## 2021-04-28 RX ORDER — KETAMINE HYDROCHLORIDE 50 MG/ML
INJECTION, SOLUTION INTRAMUSCULAR; INTRAVENOUS AS NEEDED
Status: DISCONTINUED | OUTPATIENT
Start: 2021-04-28 | End: 2021-04-28 | Stop reason: HOSPADM

## 2021-04-28 RX ORDER — LIDOCAINE HYDROCHLORIDE 10 MG/ML
0.1 INJECTION INFILTRATION; PERINEURAL AS NEEDED
Status: DISCONTINUED | OUTPATIENT
Start: 2021-04-28 | End: 2021-04-28 | Stop reason: HOSPADM

## 2021-04-28 RX ORDER — ONDANSETRON 2 MG/ML
4 INJECTION INTRAMUSCULAR; INTRAVENOUS
Status: DISCONTINUED | OUTPATIENT
Start: 2021-04-28 | End: 2021-04-30 | Stop reason: HOSPADM

## 2021-04-28 RX ORDER — SODIUM CHLORIDE 0.9 % (FLUSH) 0.9 %
5-40 SYRINGE (ML) INJECTION AS NEEDED
Status: DISCONTINUED | OUTPATIENT
Start: 2021-04-28 | End: 2021-04-30 | Stop reason: HOSPADM

## 2021-04-28 RX ORDER — SODIUM CHLORIDE, SODIUM LACTATE, POTASSIUM CHLORIDE, CALCIUM CHLORIDE 600; 310; 30; 20 MG/100ML; MG/100ML; MG/100ML; MG/100ML
100 INJECTION, SOLUTION INTRAVENOUS CONTINUOUS
Status: DISCONTINUED | OUTPATIENT
Start: 2021-04-28 | End: 2021-04-28 | Stop reason: HOSPADM

## 2021-04-28 RX ORDER — LIDOCAINE HYDROCHLORIDE 20 MG/ML
INJECTION, SOLUTION EPIDURAL; INFILTRATION; INTRACAUDAL; PERINEURAL AS NEEDED
Status: DISCONTINUED | OUTPATIENT
Start: 2021-04-28 | End: 2021-04-28 | Stop reason: HOSPADM

## 2021-04-28 RX ORDER — ONDANSETRON 2 MG/ML
INJECTION INTRAMUSCULAR; INTRAVENOUS AS NEEDED
Status: DISCONTINUED | OUTPATIENT
Start: 2021-04-28 | End: 2021-04-28 | Stop reason: HOSPADM

## 2021-04-28 RX ORDER — SODIUM CHLORIDE 0.9 % (FLUSH) 0.9 %
5-40 SYRINGE (ML) INJECTION EVERY 8 HOURS
Status: DISCONTINUED | OUTPATIENT
Start: 2021-04-28 | End: 2021-04-28 | Stop reason: HOSPADM

## 2021-04-28 RX ORDER — DEXAMETHASONE SODIUM PHOSPHATE 4 MG/ML
INJECTION, SOLUTION INTRA-ARTICULAR; INTRALESIONAL; INTRAMUSCULAR; INTRAVENOUS; SOFT TISSUE AS NEEDED
Status: DISCONTINUED | OUTPATIENT
Start: 2021-04-28 | End: 2021-04-28 | Stop reason: HOSPADM

## 2021-04-28 RX ORDER — PROPOFOL 10 MG/ML
INJECTION, EMULSION INTRAVENOUS AS NEEDED
Status: DISCONTINUED | OUTPATIENT
Start: 2021-04-28 | End: 2021-04-28 | Stop reason: HOSPADM

## 2021-04-28 RX ADMIN — PHENYLEPHRINE HYDROCHLORIDE 100 MCG: 10 INJECTION INTRAVENOUS at 07:59

## 2021-04-28 RX ADMIN — SUCCINYLCHOLINE CHLORIDE 160 MG: 20 INJECTION, SOLUTION INTRAMUSCULAR; INTRAVENOUS at 07:22

## 2021-04-28 RX ADMIN — DEXAMETHASONE SODIUM PHOSPHATE 10 MG: 4 INJECTION, SOLUTION INTRAMUSCULAR; INTRAVENOUS at 08:05

## 2021-04-28 RX ADMIN — GUAIFENESIN 1200 MG: 600 TABLET ORAL at 21:34

## 2021-04-28 RX ADMIN — IPRATROPIUM BROMIDE AND ALBUTEROL SULFATE 3 ML: .5; 3 SOLUTION RESPIRATORY (INHALATION) at 19:22

## 2021-04-28 RX ADMIN — ROCURONIUM BROMIDE 10 MG: 10 INJECTION, SOLUTION INTRAVENOUS at 09:19

## 2021-04-28 RX ADMIN — PHENYLEPHRINE HYDROCHLORIDE 120 MCG: 10 INJECTION INTRAVENOUS at 07:43

## 2021-04-28 RX ADMIN — PHENYLEPHRINE HYDROCHLORIDE 120 MCG: 10 INJECTION INTRAVENOUS at 07:41

## 2021-04-28 RX ADMIN — ENOXAPARIN SODIUM 40 MG: 40 INJECTION SUBCUTANEOUS at 21:33

## 2021-04-28 RX ADMIN — ACETAMINOPHEN 1000 MG: 500 TABLET ORAL at 06:35

## 2021-04-28 RX ADMIN — HYDROCODONE BITARTRATE AND ACETAMINOPHEN 1 TABLET: 5; 325 TABLET ORAL at 21:34

## 2021-04-28 RX ADMIN — SODIUM CHLORIDE, SODIUM LACTATE, POTASSIUM CHLORIDE, AND CALCIUM CHLORIDE: 600; 310; 30; 20 INJECTION, SOLUTION INTRAVENOUS at 07:45

## 2021-04-28 RX ADMIN — FENTANYL CITRATE 50 MCG: 50 INJECTION, SOLUTION INTRAMUSCULAR; INTRAVENOUS at 06:38

## 2021-04-28 RX ADMIN — MIDAZOLAM 1 MG: 1 INJECTION INTRAMUSCULAR; INTRAVENOUS at 06:38

## 2021-04-28 RX ADMIN — DOCUSATE SODIUM 50 MG AND SENNOSIDES 8.6 MG 1 TABLET: 8.6; 5 TABLET, FILM COATED ORAL at 13:25

## 2021-04-28 RX ADMIN — HYDROCODONE BITARTRATE AND ACETAMINOPHEN 1 TABLET: 5; 325 TABLET ORAL at 16:32

## 2021-04-28 RX ADMIN — GABAPENTIN 300 MG: 300 CAPSULE ORAL at 16:33

## 2021-04-28 RX ADMIN — Medication 10 MG: at 07:59

## 2021-04-28 RX ADMIN — ROCURONIUM BROMIDE 20 MG: 10 INJECTION, SOLUTION INTRAVENOUS at 08:15

## 2021-04-28 RX ADMIN — HYDROMORPHONE HYDROCHLORIDE 1 MG: 1 INJECTION, SOLUTION INTRAMUSCULAR; INTRAVENOUS; SUBCUTANEOUS at 13:27

## 2021-04-28 RX ADMIN — ROCURONIUM BROMIDE 20 MG: 10 INJECTION, SOLUTION INTRAVENOUS at 08:59

## 2021-04-28 RX ADMIN — FENTANYL CITRATE 100 MCG: 50 INJECTION INTRAMUSCULAR; INTRAVENOUS at 07:20

## 2021-04-28 RX ADMIN — PHENYLEPHRINE HYDROCHLORIDE 100 MCG: 10 INJECTION INTRAVENOUS at 09:25

## 2021-04-28 RX ADMIN — DOCUSATE SODIUM 50 MG AND SENNOSIDES 8.6 MG 1 TABLET: 8.6; 5 TABLET, FILM COATED ORAL at 16:33

## 2021-04-28 RX ADMIN — GUAIFENESIN 1200 MG: 600 TABLET ORAL at 13:26

## 2021-04-28 RX ADMIN — Medication 10 ML: at 13:25

## 2021-04-28 RX ADMIN — ROCURONIUM BROMIDE 50 MG: 10 INJECTION, SOLUTION INTRAVENOUS at 07:31

## 2021-04-28 RX ADMIN — HYDROMORPHONE HYDROCHLORIDE 0.5 MG: 1 INJECTION, SOLUTION INTRAMUSCULAR; INTRAVENOUS; SUBCUTANEOUS at 11:08

## 2021-04-28 RX ADMIN — GABAPENTIN 300 MG: 300 CAPSULE ORAL at 21:34

## 2021-04-28 RX ADMIN — ROPIVACAINE HYDROCHLORIDE 30 ML: 150 INJECTION, SOLUTION EPIDURAL; INFILTRATION; PERINEURAL at 06:40

## 2021-04-28 RX ADMIN — KETAMINE HYDROCHLORIDE 20 MG: 50 INJECTION INTRAMUSCULAR; INTRAVENOUS at 08:30

## 2021-04-28 RX ADMIN — PROPOFOL 200 MG: 10 INJECTION, EMULSION INTRAVENOUS at 07:21

## 2021-04-28 RX ADMIN — NEOSTIGMINE METHYLSULFATE 4 MG: 1 INJECTION, SOLUTION INTRAVENOUS at 10:25

## 2021-04-28 RX ADMIN — KETAMINE HYDROCHLORIDE 50 MG: 50 INJECTION INTRAMUSCULAR; INTRAVENOUS at 07:20

## 2021-04-28 RX ADMIN — HYDROMORPHONE HYDROCHLORIDE 0.4 MG: 2 INJECTION INTRAMUSCULAR; INTRAVENOUS; SUBCUTANEOUS at 10:27

## 2021-04-28 RX ADMIN — Medication 10 MG: at 08:11

## 2021-04-28 RX ADMIN — Medication 10 ML: at 21:35

## 2021-04-28 RX ADMIN — GLYCOPYRROLATE 0.4 MG: 0.2 INJECTION, SOLUTION INTRAMUSCULAR; INTRAVENOUS at 10:25

## 2021-04-28 RX ADMIN — KETAMINE HYDROCHLORIDE 10 MG: 50 INJECTION INTRAMUSCULAR; INTRAVENOUS at 09:46

## 2021-04-28 RX ADMIN — LIDOCAINE HYDROCHLORIDE 100 MG: 20 INJECTION, SOLUTION EPIDURAL; INFILTRATION; INTRACAUDAL; PERINEURAL at 07:20

## 2021-04-28 RX ADMIN — HYDROMORPHONE HYDROCHLORIDE 0.5 MG: 1 INJECTION, SOLUTION INTRAMUSCULAR; INTRAVENOUS; SUBCUTANEOUS at 11:23

## 2021-04-28 RX ADMIN — CEFAZOLIN 1 G: 1 INJECTION, POWDER, FOR SOLUTION INTRAMUSCULAR; INTRAVENOUS; PARENTERAL at 15:51

## 2021-04-28 RX ADMIN — SODIUM CHLORIDE, SODIUM LACTATE, POTASSIUM CHLORIDE, AND CALCIUM CHLORIDE 50 ML/HR: 600; 310; 30; 20 INJECTION, SOLUTION INTRAVENOUS at 13:24

## 2021-04-28 RX ADMIN — DEXAMETHASONE SODIUM PHOSPHATE 4 MG: 4 INJECTION, SOLUTION INTRAMUSCULAR; INTRAVENOUS at 06:40

## 2021-04-28 RX ADMIN — PROPOFOL 100 MG: 10 INJECTION, EMULSION INTRAVENOUS at 07:28

## 2021-04-28 RX ADMIN — CEFAZOLIN 2 G: 1 INJECTION, POWDER, FOR SOLUTION INTRAVENOUS at 08:05

## 2021-04-28 RX ADMIN — ONDANSETRON 4 MG: 2 INJECTION INTRAMUSCULAR; INTRAVENOUS at 10:09

## 2021-04-28 RX ADMIN — SODIUM CHLORIDE, SODIUM LACTATE, POTASSIUM CHLORIDE, AND CALCIUM CHLORIDE 100 ML/HR: 600; 310; 30; 20 INJECTION, SOLUTION INTRAVENOUS at 06:03

## 2021-04-28 RX ADMIN — Medication 5 MG: at 07:43

## 2021-04-28 NOTE — BRIEF OP NOTE
Brief Postoperative Note    Patient: Miguel Flowers  YOB: 1955  MRN: [id number]    Date of Procedure: 4/28/2021     Pre-Op Diagnosis: Lung mass [R91.8]    Post-Op Diagnosis: right lung cancer, adenocarcinoma      Procedure(s):  RIGHT THORACOSCOPY VATS, WEDGE RESECTION WITH FROZEN SECTION,   Completion LOWER LOBECTOMY,   MEDIASTINAL  LYMPHADENECTOMY,   CRYOABLATION OF INTERCOSTAL NERVES  Utility thoracotomy    Surgeon(s):   Claudia Arias MD    Surgical Assistant: Nurse Practitioner: Lorie Krishnan NP    Anesthesia: General     Estimated Blood Loss (mL): less than 408     Complications: None    Specimens:   ID Type Source Tests Collected by Time Destination   1 : Right lower lobe wedge Frozen Section Chest  Claudia Arias MD 4/28/2021 0221 Pathology   2 : Interlobar lymph node Preservative Chest  Claudia Arias MD 4/28/2021 6923 Pathology   3 : Right lower lobe of lung Fresh Chest  Claudia Arias MD 4/28/2021 0945 Pathology   4 : Subcarinal lymph nodes X4 Preservative Chest  Claudia Arias MD 4/28/2021 1384 Pathology        Implants: * No implants in log *    Drains: * No LDAs found *    Findings: frozen confirmed adenocarcinoma    Electronically Signed by Haynes Mcburney, MD on 4/28/2021 at 10:35 AM

## 2021-04-28 NOTE — PROGRESS NOTES
Spiritual Care visit. Initial Visit, Pre Surgery Consult. Visit and prayer before patient goes to surgery.     Visit by Jessy Slater M.Ed., Th.B. ,Staff

## 2021-04-28 NOTE — ANESTHESIA POSTPROCEDURE EVALUATION
Procedure(s):  RIGHT THORACOSCOPY VATS, WEDGE RESECTION WITH FROZEN SECTION, LOWER LOBECTOMY, MEDIASTINAL  LYMPHADENECTOMY, CRYOABLATION OF INTERCOSTAL NERVES. general    Anesthesia Post Evaluation      Multimodal analgesia: multimodal analgesia used between 6 hours prior to anesthesia start to PACU discharge  Patient location during evaluation: PACU  Patient participation: complete - patient participated  Level of consciousness: awake  Pain management: adequate  Airway patency: patent  Anesthetic complications: no  Cardiovascular status: acceptable  Respiratory status: acceptable  Hydration status: acceptable  Post anesthesia nausea and vomiting:  controlled  Final Post Anesthesia Temperature Assessment:  Normothermia (36.0-37.5 degrees C)      INITIAL Post-op Vital signs:   Vitals Value Taken Time   /56 04/28/21 1213   Temp 36.5 °C (97.7 °F) 04/28/21 1049   Pulse 71 04/28/21 1214   Resp 15 04/28/21 1203   SpO2 96 % 04/28/21 1214   Vitals shown include unvalidated device data.

## 2021-04-28 NOTE — PROGRESS NOTES
Care Management Interventions  PCP Verified by CM: Yes(Dr. Rebeca Vaughan)  Mode of Transport at Discharge: Self  Transition of Care Consult (CM Consult): Discharge Planning  Discharge Durable Medical Equipment: No  Physical Therapy Consult: Yes  Occupational Therapy Consult: No  Speech Therapy Consult: No  Current Support Network: Own Home, Lives with Spouse, Family Lives Nearby  Confirm Follow Up Transport: Self  [place] Resource Information Provided?: No  Discharge Location  Discharge Placement: Home    CM met with patient in room. Patient sleeping, wife Walter Parker at bedside. Walter Parker verified all demographic information to be correct. Walter Parker stated she and patient live in a one story home that has 3 steps to enter the residence. Walter Parker stated patient does not use any DME at his time. Walter Parker stated prior to hospital admission patient is independent with ADL's and continues to drive. Walter Parker stated they are able to afford needed medication and obtains them from [address] [place] [address] [place]. Patient has never used [facility] or been to [facility] and would like to return home at discharge. PT has been consulted and is pending at this time. CM will continue to follow patient during hospitalization for discharge planning and needs. Please consult or notify CM of new needs.

## 2021-04-28 NOTE — ANESTHESIA PROCEDURE NOTES
Ultrasound-Guided Erector Spinae Block     Start time: 4/28/2021 6:38 AM  End time: 4/28/2021 6:43 AM  Performed by: Deyanira Enriquez MD  Authorized by: Deyanira Enriquez MD       Pre-procedure:   Timeout Time: 06:38          Block Type:   Block Type:  Erector spinae  Laterality:  Right  Monitoring:  Standard ASA monitoring, continuous pulse ox, frequent vital sign checks, heart rate, responsive to questions and oxygen  Injection Technique:  Single shot  Procedures: ultrasound guided    Patient Position: seated  Prep: alcohol    : R paraspinal   Needle Type:  SonoPlex  Needle Gauge:  20 G  Needle Localization:  Ultrasound guidance  Medication Injected:  Ropivacaine (PF) (NAROPIN)(0.5%) 5 mg/mL injection, 30 mL  dexamethasone (DECADRON) 4 mg/mL injection, 4 mg  Med Admin Time: 4/28/2021 6:40 AM    Assessment:  Number of attempts:  1  Injection Assessment:  Incremental injection every 5 mL, negative aspiration for CSF, negative aspiration for blood, no paresthesia, no intravascular symptoms and ultrasound image on chart  Patient tolerance:  Patient tolerated the procedure well with no immediate complications

## 2021-04-28 NOTE — PERIOP NOTES
Hand off report to [place].
TRANSFER - OUT REPORT:    Verbal report given to Tasha Chun RN(name) on Moises Archibald  being transferred to [facility](unit) for routine progression of care       Report consisted of patients Situation, Background, Assessment and   Recommendations(SBAR). Information from the following report(s) SBAR, Kardex, OR Summary, Procedure Summary, Intake/Output, MAR, Med Rec Status and Cardiac Rhythm Sr was reviewed with the receiving nurse. Lines:   Peripheral IV 04/28/21 Left Hand (Active)   Site Assessment Clean, dry, & intact 04/28/21 1144   Phlebitis Assessment 0 04/28/21 1144   Infiltration Assessment 0 04/28/21 1144   Dressing Status Clean, dry, & intact 04/28/21 1144   Dressing Type Tape;Transparent 04/28/21 0600   Hub Color/Line Status Green; Infusing 04/28/21 0600       Peripheral IV 04/28/21 Right Forearm (Active)   Site Assessment Clean, dry, & intact 04/28/21 1144   Phlebitis Assessment 0 04/28/21 1144   Infiltration Assessment 0 04/28/21 1144   Dressing Status Clean, dry, & intact 04/28/21 1144       Arterial Line 04/28/21 Left Radial artery (Active)   Site Assessment Clean, dry, & intact 04/28/21 1144   Dressing Status Clean, dry, & intact 04/28/21 1144   Dressing Type Transparent 04/28/21 1046   Line Status Intact and in place 04/28/21 1046   Treatment Zeroed or re-zeroed 04/28/21 1046   Affected Extremity/Extremities Color distal to insertion site pink (or appropriate for race); Pulses palpable;Range of motion performed 04/28/21 1046        Opportunity for questions and clarification was provided.       Patient transported with:   Tech   O2 at 4L NC
XR at bedside at this time.
Xray and RT called for post op orders.
no

## 2021-04-28 NOTE — ANESTHESIA PROCEDURE NOTES
Arterial Line Placement    Start time: 4/28/2021 7:38 AM  End time: 4/28/2021 7:41 AM  Performed by: Donna Garrison CRNA  Authorized by: Maryan Kwan MD     Pre-Procedure  Indications:  Arterial pressure monitoring and blood sampling  Preanesthetic Checklist: patient identified, risks and benefits discussed, anesthesia consent, site marked, patient being monitored, timeout performed and patient being monitored    Timeout Time: 07:38        Procedure:   Prep:  ChloraPrep and alcohol  Seldinger Technique?: Yes    Orientation:  Left  Location:  Radial artery  Catheter size:  20 G  Number of attempts:  1  Cont Cardiac Output Sensor: No      Assessment:   Post-procedure:  Sterile dressing applied and line secured  Patient Tolerance:  Patient tolerated the procedure well with no immediate complications

## 2021-04-28 NOTE — PROGRESS NOTES
04/28/21 1320   Dual Skin Pressure Injury Assessment   Dual Skin Pressure Injury Assessment WDL   Second Care Provider (Based on [address] [place] [address]) Sergio Jones

## 2021-04-28 NOTE — PROGRESS NOTES
TRANSFER - IN REPORT:    Verbal report received from Jaswant(name) on Mickey Page  being received from PACU(unit) for routine post - op      Report consisted of patients Situation, Background, Assessment and   Recommendations(SBAR). Information from the following report(s) SBAR, Kardex, OR Summary, Intake/Output, MAR and Recent Results was reviewed with the receiving nurse. Opportunity for questions and clarification was provided. Assessment completed upon patients arrival to unit and care assumed.

## 2021-04-28 NOTE — PROGRESS NOTES
PT Note  Orders received,  Chart reviewed. Attempted to see pnt this pm, but RN advised hold at this time due to pnt just coming back to the floor. Will attempt again, pending pnt status and scheduling.     Thank you,  Miguel Butler, PT, DPT

## 2021-04-29 ENCOUNTER — APPOINTMENT (OUTPATIENT)
Dept: GENERAL RADIOLOGY | Age: 66
DRG: 165 | End: 2021-04-29
Attending: NURSE PRACTITIONER
Payer: OTHER GOVERNMENT

## 2021-04-29 LAB
ANION GAP SERPL CALC-SCNC: 8 MMOL/L (ref 7–16)
BASOPHILS # BLD: 0 K/UL (ref 0–0.2)
BASOPHILS NFR BLD: 0 % (ref 0–2)
BUN SERPL-MCNC: 17 MG/DL (ref 8–23)
CALCIUM SERPL-MCNC: 9.1 MG/DL (ref 8.3–10.4)
CHLORIDE SERPL-SCNC: 105 MMOL/L (ref 98–107)
CO2 SERPL-SCNC: 25 MMOL/L (ref 21–32)
CREAT SERPL-MCNC: 1.15 MG/DL (ref 0.8–1.5)
DIFFERENTIAL METHOD BLD: ABNORMAL
EOSINOPHIL # BLD: 0 K/UL (ref 0–0.8)
EOSINOPHIL NFR BLD: 0 % (ref 0.5–7.8)
ERYTHROCYTE [DISTWIDTH] IN BLOOD BY AUTOMATED COUNT: 14.1 % (ref 11.9–14.6)
GLUCOSE SERPL-MCNC: 137 MG/DL (ref 65–100)
HCT VFR BLD AUTO: 40.8 % (ref 41.1–50.3)
HGB BLD-MCNC: 13.8 G/DL (ref 13.6–17.2)
IMM GRANULOCYTES # BLD AUTO: 0.1 K/UL (ref 0–0.5)
IMM GRANULOCYTES NFR BLD AUTO: 0 % (ref 0–5)
LYMPHOCYTES # BLD: 0.9 K/UL (ref 0.5–4.6)
LYMPHOCYTES NFR BLD: 6 % (ref 13–44)
MCH RBC QN AUTO: 28.8 PG (ref 26.1–32.9)
MCHC RBC AUTO-ENTMCNC: 33.8 G/DL (ref 31.4–35)
MCV RBC AUTO: 85 FL (ref 79.6–97.8)
MONOCYTES # BLD: 1.2 K/UL (ref 0.1–1.3)
MONOCYTES NFR BLD: 8 % (ref 4–12)
NEUTS SEG # BLD: 12.3 K/UL (ref 1.7–8.2)
NEUTS SEG NFR BLD: 85 % (ref 43–78)
NRBC # BLD: 0 K/UL (ref 0–0.2)
PLATELET # BLD AUTO: 295 K/UL (ref 150–450)
PMV BLD AUTO: 9.5 FL (ref 9.4–12.3)
POTASSIUM SERPL-SCNC: 4.5 MMOL/L (ref 3.5–5.1)
RBC # BLD AUTO: 4.8 M/UL (ref 4.23–5.6)
SODIUM SERPL-SCNC: 138 MMOL/L (ref 136–145)
WBC # BLD AUTO: 14.4 K/UL (ref 4.3–11.1)

## 2021-04-29 PROCEDURE — 74011250637 HC RX REV CODE- 250/637: Performed by: NURSE PRACTITIONER

## 2021-04-29 PROCEDURE — 74011250637 HC RX REV CODE- 250/637: Performed by: SURGERY

## 2021-04-29 PROCEDURE — 97161 PT EVAL LOW COMPLEX 20 MIN: CPT | Performed by: PHYSICAL THERAPIST

## 2021-04-29 PROCEDURE — 71045 X-RAY EXAM CHEST 1 VIEW: CPT

## 2021-04-29 PROCEDURE — 94664 DEMO&/EVAL PT USE INHALER: CPT

## 2021-04-29 PROCEDURE — 94640 AIRWAY INHALATION TREATMENT: CPT

## 2021-04-29 PROCEDURE — 2709999900 HC NON-CHARGEABLE SUPPLY

## 2021-04-29 PROCEDURE — 74011000250 HC RX REV CODE- 250: Performed by: NURSE PRACTITIONER

## 2021-04-29 PROCEDURE — 74011000258 HC RX REV CODE- 258: Performed by: NURSE PRACTITIONER

## 2021-04-29 PROCEDURE — 97165 OT EVAL LOW COMPLEX 30 MIN: CPT

## 2021-04-29 PROCEDURE — 97530 THERAPEUTIC ACTIVITIES: CPT | Performed by: PHYSICAL THERAPIST

## 2021-04-29 PROCEDURE — 85025 COMPLETE CBC W/AUTO DIFF WBC: CPT

## 2021-04-29 PROCEDURE — 65270000029 HC RM PRIVATE

## 2021-04-29 PROCEDURE — 36415 COLL VENOUS BLD VENIPUNCTURE: CPT

## 2021-04-29 PROCEDURE — 74011250636 HC RX REV CODE- 250/636: Performed by: NURSE PRACTITIONER

## 2021-04-29 PROCEDURE — 80048 BASIC METABOLIC PNL TOTAL CA: CPT

## 2021-04-29 PROCEDURE — 97535 SELF CARE MNGMENT TRAINING: CPT

## 2021-04-29 PROCEDURE — 77010033678 HC OXYGEN DAILY

## 2021-04-29 RX ORDER — ATORVASTATIN CALCIUM 10 MG/1
10 TABLET, FILM COATED ORAL DAILY
Status: DISCONTINUED | OUTPATIENT
Start: 2021-04-29 | End: 2021-04-30 | Stop reason: HOSPADM

## 2021-04-29 RX ORDER — LISINOPRIL 20 MG/1
20 TABLET ORAL DAILY
Status: DISCONTINUED | OUTPATIENT
Start: 2021-04-29 | End: 2021-04-30 | Stop reason: HOSPADM

## 2021-04-29 RX ORDER — IPRATROPIUM BROMIDE AND ALBUTEROL SULFATE 2.5; .5 MG/3ML; MG/3ML
3 SOLUTION RESPIRATORY (INHALATION)
Status: DISCONTINUED | OUTPATIENT
Start: 2021-04-29 | End: 2021-04-30 | Stop reason: HOSPADM

## 2021-04-29 RX ORDER — AMLODIPINE BESYLATE 10 MG/1
10 TABLET ORAL DAILY
Status: DISCONTINUED | OUTPATIENT
Start: 2021-04-29 | End: 2021-04-30 | Stop reason: HOSPADM

## 2021-04-29 RX ADMIN — IPRATROPIUM BROMIDE AND ALBUTEROL SULFATE 3 ML: .5; 3 SOLUTION RESPIRATORY (INHALATION) at 07:12

## 2021-04-29 RX ADMIN — AMLODIPINE BESYLATE 10 MG: 10 TABLET ORAL at 11:49

## 2021-04-29 RX ADMIN — CEFAZOLIN 1 G: 1 INJECTION, POWDER, FOR SOLUTION INTRAMUSCULAR; INTRAVENOUS; PARENTERAL at 00:25

## 2021-04-29 RX ADMIN — GABAPENTIN 300 MG: 300 CAPSULE ORAL at 21:10

## 2021-04-29 RX ADMIN — SENNOSIDES AND DOCUSATE SODIUM 2 TABLET: 8.6; 5 TABLET ORAL at 17:14

## 2021-04-29 RX ADMIN — Medication 10 ML: at 05:32

## 2021-04-29 RX ADMIN — GUAIFENESIN 1200 MG: 600 TABLET ORAL at 08:09

## 2021-04-29 RX ADMIN — HYDROMORPHONE HYDROCHLORIDE 1 MG: 1 INJECTION, SOLUTION INTRAMUSCULAR; INTRAVENOUS; SUBCUTANEOUS at 11:34

## 2021-04-29 RX ADMIN — Medication 10 ML: at 15:15

## 2021-04-29 RX ADMIN — GABAPENTIN 300 MG: 300 CAPSULE ORAL at 08:10

## 2021-04-29 RX ADMIN — Medication 10 ML: at 21:10

## 2021-04-29 RX ADMIN — ENOXAPARIN SODIUM 40 MG: 40 INJECTION SUBCUTANEOUS at 21:10

## 2021-04-29 RX ADMIN — ATORVASTATIN CALCIUM 10 MG: 10 TABLET, FILM COATED ORAL at 11:49

## 2021-04-29 RX ADMIN — HYDROCODONE BITARTRATE AND ACETAMINOPHEN 1 TABLET: 5; 325 TABLET ORAL at 04:08

## 2021-04-29 RX ADMIN — HYDROCODONE BITARTRATE AND ACETAMINOPHEN 2 TABLET: 5; 325 TABLET ORAL at 19:27

## 2021-04-29 RX ADMIN — GABAPENTIN 300 MG: 300 CAPSULE ORAL at 15:19

## 2021-04-29 RX ADMIN — SENNOSIDES AND DOCUSATE SODIUM 2 TABLET: 8.6; 5 TABLET ORAL at 08:09

## 2021-04-29 RX ADMIN — PANTOPRAZOLE SODIUM 40 MG: 40 TABLET, DELAYED RELEASE ORAL at 05:31

## 2021-04-29 RX ADMIN — GUAIFENESIN 1200 MG: 600 TABLET ORAL at 21:10

## 2021-04-29 RX ADMIN — LISINOPRIL 20 MG: 20 TABLET ORAL at 11:49

## 2021-04-29 NOTE — ADDENDUM NOTE
Addendum  created 04/29/21 0728 by Nika Borden CRNA    Flowsheet accepted, Intraprocedure Flowsheets edited General Surgery Focus Note:    Creatinine increased to 2.73 today. Feeling better than yesterday, but still belching. No vomiting. Has been walking multiple times in the halls. Passing flatus. Has better bowel sounds today. No BM.    Urostomy stoma jesus irrigated with 100mL of sterile water with return of multiple mucus clots. 350mL of urine returned. Patient tolerated well.    New incisional dressing applied as dressing was wet.    Urology and Nephrology following.    Will re-evaluate later today. Keep NPO except meds.    Martha Weldon PA-C  12/6/20.

## 2021-04-29 NOTE — PROGRESS NOTES
END OF SHIFT NOTE:    Hourly rounds conducted. INTAKE/OUTPUT  04/28 0701 - 04/29 0700  In: 5015 [P.O.:240; I.V.:1377]  Out: 1826 [Urine:2865]  Voiding: YES  Catheter: YES  Drain:              Flatus: Patient does not have flatus present. Stool:  0 occurrences. Characteristics:       Emesis: 0 occurrences. Characteristics:        VITAL SIGNS  Patient Vitals for the past 12 hrs:   Temp Pulse Resp BP SpO2   04/29/21 0253 97.7 °F (36.5 °C) 84 17 132/72 97 %   04/29/21 0010 98.3 °F (36.8 °C) 87 21 (!) 145/88 97 %   04/28/21 2012 98.1 °F (36.7 °C) 91 17 (!) 165/83 95 %   04/28/21 1925     97 %       Pain Assessment  Pain Intensity 1: 8 (04/29/21 0406)  Pain Location 1: Flank  Pain Intervention(s) 1: Medication (see MAR)  Patient Stated Pain Goal: 0    Ambulating  No    Shift report given to oncoming nurse at the bedside.     Cipriano West

## 2021-04-29 NOTE — PROGRESS NOTES
Posterior chest tube removed at end inspiration. Dressing applied. Patient tolerated well.  CXR ordered for RUFINA Guzman NP

## 2021-04-29 NOTE — PROGRESS NOTES
END OF SHIFT NOTE:    INTAKE/OUTPUT  04/28 0701 - 04/29 0700  In: 2074 [P.O.:240; I.V.:1834]  Out: 4675 [Urine:4065]  Voiding: YES  Catheter: NO  Drain:              Flatus: Patient does have flatus present. Stool:  0 occurrences. Characteristics:  Stool Assessment  Stool Appearance: (have not observed)    Emesis: 0 occurrences. Characteristics:        VITAL SIGNS  Patient Vitals for the past 12 hrs:   Temp Pulse Resp BP SpO2   04/29/21 1500 97.9 °F (36.6 °C) 69 19 127/75 97 %   04/29/21 1155 98.1 °F (36.7 °C) 70 19 (!) 151/82 97 %       Pain Assessment  Pain Intensity 1: 8 (04/29/21 1927)  Pain Location 1: Flank  Pain Intervention(s) 1: Medication (see MAR)  Patient Stated Pain Goal: 0    Ambulating  Yes    Shift report given to oncoming nurse at the bedside.     Eliezer Merino RN

## 2021-04-29 NOTE — OP NOTES
[facility]  OPERATIVE REPORT    Name:  Arlette Olson  MR#:  [id number]  :  1955  ACCOUNT #:  [de-identified]  DATE OF SERVICE:  2021    PREOPERATIVE DIAGNOSIS:  Right lung mass suspicious for malignancy. POSTOPERATIVE DIAGNOSIS:  Right lung cancer, adenocarcinoma. PROCEDURES PERFORMED:  1. Right thoracoscopy with wedge resection and intraoperative frozen. 2.  Right thoracoscopy, completion lower lobectomy. 3.  Mediastinal lymphadenectomy. 4.  Cryoablation of intercostal nerves. 5.  Utility thoracotomy. SURGEON:  Minerva Langley MD    ASSISTANT:  Mone Hadley NP    ANESTHESIA:  General.    COMPLICATIONS:  none    SPECIMENS REMOVED:  As above    IMPLANTS:  CT x 2    ESTIMATED BLOOD LOSS:  About 100 mL. INDICATION:  This is a 69-year-old gentleman presented with enlarging mass in his right lung, although biopsy was inconclusive but this is clinically suspicious for malignancy. We offered him a wedge resection followed by lobectomy if malignancy is confirmed and his lung function is reasonable to tolerate this surgery. He understood risks and benefits, agreed to proceed. FINDINGS:  Intraoperative frozen section confirmed adenocarcinoma. PROCEDURE:  After informed consent obtained, the patient brought into the operating room. General anesthesia was administered. Double-lumen tube placed per Anesthesia. The patient then positioned to left decubitus position and the right side up. His right chest was prepped and draped in routine fashion. First trocar placed at anterior axillary line seventh intercostal space and pleural cavity was entered under direct vision. Then, I placed the second trocar at the mid axillary line fourth intercostal space. The third trocar placed at the posterior axillary line ninth intercostal space.   The lesion was identified right off the inferior pulmonary ligament, so we first mobilized the inferior pulmonary ligament all the way to the inferior pulmonary vein. Then, I was able to lift the lung tissues and then the Endo-RASHEEDA stapler with blue load was used and multiple firings were performed to wedge out the mass. The Endo bag was used to retrieve the mass. Then, frozen was sent and it came back positive for adenocarcinoma. So then, we decided to proceed with completion lobectomy. I switched the superior trocar site into a minilaparotomy incision. Dissection carried through the fascia and the muscle was divided. Then the space was entered right above the rib. Utility thoracotomy was crucial for the surgeries. We retracted the lung and then continued dissection along the inferior pulmonary vein. This was isolated circumferentially and then Endo-RASHEEDA stapler with vascular load was used. The inferior pulmonary vein was divided. Then, we continued to dissect the fissure. The fissure between the lower and middle lobe was dissected and some of the fissure was divided with Endo-RASHEEDA stapler with blue load and then I was able to dissect down to the pulmonary artery. The lymph nodes in front of the artery were dissected and then removed separately. Then, the fissure was continued into the major fissure posteriorly. I was able to dissect the pulmonary artery almost circumferentially. One branch seemed  from the main branch. This was divided individually with a vascular stapler. Then posteriorly, the hilum was dissected onto the back side of the left main bronchus. Then the left lower lobe was completely held up and I felt max stapling can be performed to take the inferior bronchus and the pulmonary artery together. Again, the Endo-RASHEEDA stapler was used. This time, we used a black load. The stapler was positioned just below the middle lobe bronchus to cross the major fissure. Once the stapler was applied and the middle upper lobe was inflated, both were inflated nicely and then the stapler was fired.   Additional stapling was used to finish the fissure and then the lower lobe was completely free. Again, an Endo bag was used. The lobe was removed from the mini thoracotomy incision. Then we continued lymphadenectomy. The entire subcarinal lymph nodes were dissected. There were four lymph nodes matted together. These all removed. Further dissections revealed no lymph nodes around the inferior pulmonary vein. Then we proceeded with intercostal nerve cryoablation and the mini thoracotomy incision was covered so were the chest tube site. There were total five levels performed. Then two chest tubes were placed, one to the apex, one along the diaphragm. Surgical field inspected. Copious irrigation was used. Return fluid was clear. There were no bleeding at the end of the case. Then the mini thoracotomy incision was closed on the fascia with running 2-0 Vicryl stitch. Skin closed with staples. The patient tolerated the procedure well, transferred to recovery room in stable condition. All the instrument count and lap count were correct. As noted, Letha Ball was the first assistant in this case. She offered excellent exposure and helped the stapling and it would be very dangerous or impossible to perform this case without her assistance.       Aylin De Jesus MD      [id number]  D:  04/28/2021 10:50  T:  04/28/2021 21:29  JOB #:  [id number]

## 2021-04-29 NOTE — PROGRESS NOTES
PLAN:  Await pathology results- frozen section adenocarcinoma  DIET REGULAR  SCD/IS/SCD's or Sequential Compression Device and Lovenox for prophylactic measures  Pain/nausea control  Monitor labs  Chest tubes to water seal today  DC posterior chest tube  DC Emanuel  Daily CXR  Hopefully removed anterior chest tube tomorrow and possible DC home      ASSESSMENT:  Admit Date: 4/28/2021   1 Day Post-Op  Procedure(s):  RIGHT THORACOSCOPY VATS, WEDGE RESECTION WITH FROZEN SECTION, LOWER LOBECTOMY, MEDIASTINAL  LYMPHADENECTOMY, CRYOABLATION OF INTERCOSTAL NERVES    Principal Problem:    Lung mass (4/28/2021)    Active Problems:    Bronchogenic cancer of right lung ([address] (4/28/2021)         SUBJECTIVE:  04/29/21-  1 Day Post-Op awake in bed, no complaints. Pain controlled. AF, VSS. Chest tubes without air leak noted. Intake/Output Summary (Last 24 hours) at 4/29/2021 0915  Last data filed at 4/29/2021 0705  Gross per 24 hour   Intake 1474 ml   Output 4465 ml   Net -2991 ml     OBJECTIVE:  Constitutional: Alert oriented cooperative patient in no acute distress; appears stated age   Visit Vitals  BP (!) 155/84   Pulse 76   Temp 98 °F (36.7 °C)   Resp 19   Ht 6' (1.829 m)   Wt 250 lb (113.4 kg)   SpO2 97%   BMI 33.91 kg/m²     Eyes:Sclera are clear. ENMT: no external lesions gross hearing normal; no obvious neck masses, no ear or lip lesions  CV: RRR. Normal perfusion  Resp: No JVD. Breathing is  non-labored; no audible wheezing. Chest tubes maintained to suction- no air leak noted    GI: soft and non-distended     Musculoskeletal: unremarkable with normal function. No embolic signs or cyanosis.    Neuro:  Oriented; moves all 4; no focal deficits  Psychiatric: normal affect and mood, no memory impairment      Patient Vitals for the past 24 hrs:   BP Temp Pulse Resp SpO2   04/29/21 0712     97 %   04/29/21 0705 (!) 155/84 98 °F (36.7 °C) 76 19 97 %   04/29/21 0253 132/72 97.7 °F (36.5 °C) 84 17 97 %   04/29/21 0010 (!) 145/88 98.3 °F (36.8 °C) 87 21 97 %   04/28/21 2012 (!) 165/83 98.1 °F (36.7 °C) 91 17 95 %   04/28/21 1925     97 %   04/28/21 1510 (!) 147/84 97.7 °F (36.5 °C) 77 18 96 %   04/28/21 1500 (!) 147/84 97.7 °F (36.5 °C) 77  96 %   04/28/21 1447     95 %   04/28/21 1255 129/73  70 18 95 %   04/28/21 1213 (!) 117/56  71  95 %   04/28/21 1203 125/64   15    04/28/21 1202   71  100 %   04/28/21 1154 129/63  70 15 100 %   04/28/21 1144 125/64  69 15 97 %   04/28/21 1137   64 15 100 %   04/28/21 1133 130/67  67 15 100 %   04/28/21 1123 131/76  69 15 96 %   04/28/21 1113 125/67  69 15 97 %   04/28/21 1103 (!) 152/81  61 15 96 %   04/28/21 1054 (!) 160/80  62 15 98 %   04/28/21 1049 (!) 147/76 97.7 °F (36.5 °C) (!) 58 14 97 %   04/28/21 1046 (!) 147/78  (!) 58 14 97 %     Labs:    Recent Labs     04/29/21  0400   WBC 14.4*   HGB 13.8         K 4.5      CO2 25   BUN 17   CREA 1.15   *       Signed:  Jair Avery NP

## 2021-04-29 NOTE — PROGRESS NOTES
ACUTE OT GOALS:  (Developed with and agreed upon by patient and/or caregiver.)  1. Pt will toilet independently  2. Pt will complete functional mobility for ADLs with SBA  3. Pt will complete lower body dressing with SBA using AE as needed  4. Pt will complete grooming and hygiene at sink independently  5. Pt will demonstrate independence with HEP to promote increased BUE strength, ROM, and functional use for ADLs      Timeframe: 7 days      OCCUPATIONAL THERAPY ASSESSMENT: Initial Assessment and Daily Note OT Treatment Day # 1    Mickey Llanos is a 60 y.o. 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]  Procedure(s) (LRB):  RIGHT THORACOSCOPY VATS, WEDGE RESECTION WITH FROZEN SECTION, LOWER LOBECTOMY, MEDIASTINAL  LYMPHADENECTOMY, CRYOABLATION OF INTERCOSTAL NERVES (Right)  1 Day Post-Op  Reason for Referral:  Generalized weakness, decreased endurance and RUE ROM post op  ICD-10: Treatment Diagnosis: Generalized Muscle Weakness (M62.81)  INPATIENT: Payor:  / Plan: [address] / Product Type:  /   ASSESSMENT:     REHAB RECOMMENDATIONS:   Recommendation to date pending progress:  Setting:   No further skilled therapy   Equipment:    None     PRIOR LEVEL OF FUNCTION:  (Prior to Hospitalization)  INITIAL/CURRENT LEVEL OF FUNCTION:  (Based on today's evaluation)   Bathing:   Independent  Dressing:   Independent  Feeding/Grooming:   Independent  Toileting:   Independent  Functional Mobility:   Independent Bathing:   Minimal Assistance  Dressing:   Minimal Assistance  Feeding/Grooming:   Contact Guard Assistance  Toileting:   Contact Guard Assistance  Functional Mobility:   Contact Guard Assistance     ASSESSMENT:  Mr. Mili Llanos is s/p R VATS and wedge resection, has 2 R side chest tubes. Pt presented with deficits in endurance, mobility, balance, and RUE  ROM and strength impacting ADLs.  Pt mobilized with CGA X2 overall, requires min A for LB dressing. Pt endorsed pain with breathing and mobility. Slightly decreased ROM at R shoulder d/t post op pain and chest tubes, educated on gentle AROM HEP and verbalized understanding. Pt is below his functional baseline and would benefit from skilled OT services to address deficits. SUBJECTIVE:   Mr. Ashtyn Hong states, \"It hurts when I breathe. .. don't make me laugh! \"    SOCIAL HISTORY/LIVING ENVIRONMENT: Lives with wife. Fully independent, has his own appliances business, drives. Walk in shower with shower chair, does not use an AD for mobility.  Former [employer]         OBJECTIVE:     PAIN: VITAL SIGNS: LINES/DRAINS:   Pre Treatment: Pain Screen  Pain Scale 1: Numeric (0 - 10)  Pain Intensity 1: 6  Pain Location 1: Flank  Pain Intervention(s) 1: Repositioned  Post Treatment: 6   Chest Tube  O2 Device: Nasal cannula     GROSS EVALUATION:  BUE Within Functional Limits Abnormal/ Functional Abnormal/ Non-Functional (see comments) Not Tested Comments:   AROM [] [x] [] [] RUE slightly < at shoulder   PROM [] [] [] []    Strength [] [x] [] [] Slightly decreased R shldr   Balance [] [x] [] []    Posture [] [] [] []    Sensation [] [] [] []    Coordination [x] [] [] []    Tone [] [] [] []    Edema [] [] [] []    Activity Tolerance [] [x] [] []     [] [] [] []      COGNITION/  PERCEPTION: Intact Impaired   (see comments) Comments:   Orientation [x] []    Vision [x] []    Hearing [x] []    Judgment/ Insight [x] []    Attention [x] []    Memory [x] []    Command Following [x] []    Emotional Regulation [x] []     [] []      ACTIVITIES OF DAILY LIVING: I Mod I S SBA CGA Min Mod Max Total NT Comments   BASIC ADLs:              Bathing/ Showering [] [] [] [] [] [] [] [] [] []    Toileting [] [] [] [] [] [] [] [] [] []    Dressing [] [] [] [] [] [x] [] [] [] [] LB   Feeding [] [] [] [] [] [] [] [] [] []    Grooming [] [] [] [] [] [] [] [] [] []    Personal Device Care [] [] [] [] [] [] [] [] [] [] Functional Mobility [] [] [] [] [] [] [] [] [] []    I=Independent, Mod I=Modified Independent, S=Supervision, SBA=Standby Assistance, CGA=Contact Guard Assistance,   Min=Minimal Assistance, Mod=Moderate Assistance, Max=Maximal Assistance, Total=Total Assistance, NT=Not Tested    MOBILITY: I Mod I S SBA CGA Min Mod Max Total  NT x2 Comments:   Supine to sit [] [] [] [] [] [x] [] [] [] [] []    Sit to supine [] [] [] [] [] [] [] [] [] [] []    Sit to stand [] [] [] [] [x] [] [] [] [] [] [x]    Bed to chair [] [] [] [] [x] [] [] [] [] [] [x]    I=Independent, Mod I=Modified Independent, S=Supervision, SBA=Standby Assistance, CGA=Contact Guard Assistance,   Min=Minimal Assistance, Mod=Moderate Assistance, Max=Maximal Assistance, Total=Total Assistance, NT=Not Tested    [facility] AM-PAC 6 Clicks   Daily Activity Inpatient Short Form        How much help from another person does the patient currently need. .. Total A Lot A Little None   1. Putting on and taking off regular lower body clothing? [] 1   [] 2   [x] 3   [] 4   2. Bathing (including washing, rinsing, drying)? [] 1   [] 2   [x] 3   [] 4   3. Toileting, which includes using toilet, bedpan or urinal?   [] 1   [] 2   [x] 3   [] 4   4. Putting on and taking off regular upper body clothing? [] 1   [] 2   [] 3   [x] 4   5. Taking care of personal grooming such as brushing teeth? [] 1   [] 2   [] 3   [x] 4   6. Eating meals? [] 1   [] 2   [] 3   [x] 4   © 2007, Trustees of [facility], under license to [employer]. All rights reserved     Score:  Initial: 21 Most Recent: X (Date: -- )   Interpretation of Tool:  Represents activities that are increasingly more difficult (i.e. Bed mobility, Transfers, Gait).     PLAN:   FREQUENCY/DURATION: OT Plan of Care: 3 times/week for duration of hospital stay or until stated goals are met, whichever comes first.    PROBLEM LIST:   (Skilled intervention is medically necessary to address:)  1. Decreased ADL/Functional Activities  2. Decreased Activity Tolerance  3. Decreased AROM/PROM  4. Decreased Balance  5. Decreased Strength  6. Increased Pain   INTERVENTIONS PLANNED:   (Benefits and precautions of occupational therapy have been discussed with the patient.)  1. Self Care Training  2. Therapeutic Activity  3. Therapeutic Exercise/HEP  4. Neuromuscular Re-education  5. Education     TREATMENT:     EVALUATION: Low Complexity : (Untimed Charge)    TREATMENT:   ($$ Self Care/Home Management: 8-22 mins    )  Co-Treatment PT/OT necessary due to patient's decreased overall endurance/tolerance levels, as well as need for high level skilled assistance to complete functional transfers/mobility and functional tasks  Self Care (8 Minutes): Self care including Lower Body Dressing to increase independence.     TREATMENT GRID:  N/A    AFTER TREATMENT POSITION/PRECAUTIONS:  Chair, Needs within reach and RN notified    INTERDISCIPLINARY COLLABORATION:  RN/PCT and PT/PTA    TOTAL TREATMENT DURATION:  OT Patient Time In/Time Out  Time In: 1010  Time Out: [address] [place]

## 2021-04-29 NOTE — PROGRESS NOTES
ACUTE PHYSICAL THERAPY GOALS:  (Developed with and agreed upon by patient and/or caregiver. )    LTG:  (1.)Mr. Rupali Kumar will move from supine to sit and sit to supine , scoot up and down and roll side to side in bed with INDEPENDENT within 7 treatment day(s). (2.)Mr. Rupali Kumar will transfer from bed to chair and chair to bed with INDEPENDENT using the least restrictive device within 7 treatment day(s). (3.)Mr. Rupali Kumar will ambulate with INDEPENDENT for 300 feet with the least restrictive device within 7 treatment day(s). (4.)Mr. Rupali Kumar will tolerate at least 23 min of dynamic standing activity to assist standing ADLs with the least restrictive device within 7 treatment days. (5.)Mr. Rupali Kumar will climb at least 3 steps with INDEPENDENT with the least restrictive device within 7 treatment days.     ________________________________________________________________________________________________        PHYSICAL THERAPY ASSESSMENT: Initial Assessment, Daily Note and AM PT Treatment Day # 1      Mickey Kumar is a 60 y.o. 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]  Procedure(s) (LRB):  RIGHT THORACOSCOPY VATS, WEDGE RESECTION WITH FROZEN SECTION, LOWER LOBECTOMY, MEDIASTINAL  LYMPHADENECTOMY, CRYOABLATION OF INTERCOSTAL NERVES (Right)  1 Day Post-Op  Reason for Referral:    ICD-10: Treatment Diagnosis: Generalized Muscle Weakness (M62.81)  Other lack of cordination (R27.8)  Difficulty in walking, Not elsewhere classified (R26.2)  Other abnormalities of gait and mobility (R26.89)  INPATIENT: Payor:  / Plan: [facility] / Product Type:  /     ASSESSMENT:     REHAB RECOMMENDATIONS:   Recommendation to date pending progress:  [address] [place] [address]  Equipment:    None     PRIOR LEVEL OF FUNCTION:  (Prior to Hospitalization) INITIAL/CURRENT LEVEL OF FUNCTION:  (Most Recently Demonstrated)   Bed Mobility:   Independent  Sit to Stand:   Independent  Transfers:   Independent  Gait/Mobility:   Independent Bed Mobility:   Contact Guard Assistance  Sit to Stand:  [employer] Assistance x 2  Transfers:   Contact Guard Assistance x 2  Gait/Mobility:   Contact Guard Assistance x 2     ASSESSMENT:  Mr. Anisha Yoo presents in supine w/ 2 chest tubes on his right side. He is independent and installs appliances at baseline. Upon entering, Pnt is agreeable to PT treatment. he reports 6/10 pain in his side w/ inspiration at rest. Pnt performed supine > sit with CGA and extra time, sitting EOB with good static sitting balance control. Sit > stand with CGAx2 using HHAX2. Gait x 6 ft with CGAx2 and extra time (for chest tubes management), cues for posture and not to hold his breath. Gait is noted to be very slow w /decreased step length. Stand > sit with CGAx2, followed by positioning for comfort. At end of session pt up in bedside chair with all needs within reach, alarm activated for safety, RN notified. Overall, Pnt  presents as functioning below his baseline, with deficits in mobility including transfers, gait, balance, and activity tolerance. Pt will continue to benefit from skilled therapy services to address stated deficits to promote return to highest level of function, independence, and safety. Will continue to follow. SUBJECTIVE:   Mr. Anisha Yoo states, \"I was in the [facility]. \"    SOCIAL HISTORY/LIVING ENVIRONMENT: Independent at baseline, [address], lives w/ wife     OBJECTIVE:     PAIN: VITAL SIGNS: LINES/DRAINS:   Pre Treatment: Pain Screen  Pain Scale 1: Numeric (0 - 10)  Pain Intensity 1: 6  Pain Orientation 1: Right  Post Treatment: 5   Chest Tube and IV  O2 Device: Nasal cannula     GROSS EVALUATION:   Within Functional Limits Abnormal/ Functional Abnormal/ Non-Functional (see comments) Not Tested Comments:   AROM [] [x] [] [] Decreased R UE AROM and chest expansion   PROM [] [x] [] []    Strength [] [x] [] []    Balance [] [x] [] []    Posture [x] [] [] []    Sensation [x] [] [] []    Coordination [x] [] [] []    Tone [] [] [] []    Edema [x] [] [] []    Activity Tolerance [] [x] [] [] Decreased standing tolerance    [] [] [] []      COGNITION/  PERCEPTION: Intact Impaired   (see comments) Comments:   Orientation [x] []    Vision [x] []    Hearing [x] []    Command Following [x] []    Safety Awareness [x] []     [] []      MOBILITY: I Mod I S SBA CGA Min Mod Max Total  NT x2 Comments:   Bed Mobility    Rolling [] [] [] [] [x] [] [] [] [] [] []    Supine to Sit [] [] [] [] [x] [] [] [] [] [] []    Scooting [] [] [] [] [x] [] [] [] [] [] []    Sit to Supine [] [] [] [] [] [] [] [] [] [x] []    Transfers    Sit to Stand [] [] [] [] [x] [] [] [] [] [] [x]    Bed to Chair [] [] [] [] [x] [] [] [] [] [] [x]    Stand to Sit [] [] [] [] [x] [] [] [] [] [] [x]    I=Independent, Mod I=Modified Independent, S=Supervision, SBA=Standby Assistance, CGA=Contact Guard Assistance,   Min=Minimal Assistance, Mod=Moderate Assistance, Max=Maximal Assistance, Total=Total Assistance, NT=Not Tested  GAIT: I Mod I S SBA CGA Min Mod Max Total  NT x2 Comments:   Level of Assistance [] [] [] [] [x] [] [] [] [] [] [x]    Distance 6'    DME None    Gait Quality shuffled    Weightbearing Status N/A     I=Independent, Mod I=Modified Independent, S=Supervision, SBA=Standby Assistance, CGA=Contact Guard Assistance,   Min=Minimal Assistance, Mod=Moderate Assistance, Max=Maximal Assistance, Total=Total Assistance, NT=Not Tested    [facility] [address] [id number] AM-[facility] Form       How much difficulty does the patient currently have. .. Unable A Lot A Little None   1. Turning over in bed (including adjusting bedclothes, sheets and blankets)? [] 1   [] 2   [x] 3   [] 4   2. Sitting down on and standing up from a chair with arms ( e.g., wheelchair, bedside commode, etc.)   [] 1   [] 2   [x] 3   [] 4   3.   Moving from lying on back to sitting on the side of the bed? [] 1   [] 2   [x] 3   [] 4   How much help from another person does the patient currently need. .. Total A Lot A Little None   4. Moving to and from a bed to a chair (including a wheelchair)? [] 1   [] 2   [x] 3   [] 4   5. Need to walk in hospital room? [] 1   [] 2   [x] 3   [] 4   6. Climbing 3-5 steps with a railing? [] 1   [] 2   [x] 3   [] 4   © 2007, Trustees of [address] [place] [address] [address] [address], under license to [employer]. All rights reserved     Score:  Initial: 18 Most Recent: X (Date: -- )    Interpretation of Tool:  Represents activities that are increasingly more difficult (i.e. Bed mobility, Transfers, Gait). PLAN:   FREQUENCY/DURATION: PT Plan of Care: 3 times/week for duration of hospital stay or until stated goals are met, whichever comes first.    PROBLEM LIST:   (Skilled intervention is medically necessary to address:)  1. Decreased ADL/Functional Activities  2. Decreased Activity Tolerance  3. Decreased AROM/PROM  4. Decreased Balance  5. Decreased Coordination  6. Decreased Strength  7. Decreased Transfer Abilities  8. Increased Pain   INTERVENTIONS PLANNED:   (Benefits and precautions of physical therapy have been discussed with the patient.)  1. Therapeutic Activity  2. Therapeutic Exercise/HEP  3. Neuromuscular Re-education  4. Gait Training  5. Manual Therapy  6. Education     TREATMENT:     EVALUATION: Low Complexity : (Untimed Charge)    TREATMENT:   ($$ Therapeutic Activity: 8-22 mins    )  Co-Treatment PT/OT necessary due to patient's decreased overall endurance/tolerance levels, as well as need for high level skilled assistance to complete functional transfers/mobility and functional tasks  Therapeutic Activity (12 Minutes): Therapeutic activity included Rolling, Supine to Sit, Scooting, Transfer Training, Ambulation on level ground, Sitting balance  and Standing balance to improve functional Mobility, Strength, ROM and Activity tolerance.     TREATMENT GRID:  N/A    AFTER TREATMENT POSITION/PRECAUTIONS:  Chair, Needs within reach and RN notified    INTERDISCIPLINARY COLLABORATION:  RN/PCT, PT/PTA and OT/MIGUEL    TOTAL TREATMENT DURATION:  PT Patient Time In/Time Out  Time In: 1010  Time Out: [facility]

## 2021-04-30 ENCOUNTER — APPOINTMENT (OUTPATIENT)
Dept: GENERAL RADIOLOGY | Age: 66
DRG: 165 | End: 2021-04-30
Attending: NURSE PRACTITIONER
Payer: OTHER GOVERNMENT

## 2021-04-30 VITALS
TEMPERATURE: 99.1 F | DIASTOLIC BLOOD PRESSURE: 73 MMHG | RESPIRATION RATE: 18 BRPM | HEIGHT: 72 IN | SYSTOLIC BLOOD PRESSURE: 113 MMHG | OXYGEN SATURATION: 96 % | WEIGHT: 250 LBS | HEART RATE: 66 BPM | BODY MASS INDEX: 33.86 KG/M2

## 2021-04-30 LAB
ANION GAP SERPL CALC-SCNC: 6 MMOL/L (ref 7–16)
BASOPHILS # BLD: 0 K/UL (ref 0–0.2)
BASOPHILS NFR BLD: 0 % (ref 0–2)
BUN SERPL-MCNC: 20 MG/DL (ref 8–23)
CALCIUM SERPL-MCNC: 8.6 MG/DL (ref 8.3–10.4)
CHLORIDE SERPL-SCNC: 106 MMOL/L (ref 98–107)
CO2 SERPL-SCNC: 26 MMOL/L (ref 21–32)
CREAT SERPL-MCNC: 1.25 MG/DL (ref 0.8–1.5)
DIFFERENTIAL METHOD BLD: ABNORMAL
EOSINOPHIL # BLD: 0.1 K/UL (ref 0–0.8)
EOSINOPHIL NFR BLD: 0 % (ref 0.5–7.8)
ERYTHROCYTE [DISTWIDTH] IN BLOOD BY AUTOMATED COUNT: 14.6 % (ref 11.9–14.6)
GLUCOSE SERPL-MCNC: 124 MG/DL (ref 65–100)
HCT VFR BLD AUTO: 39.7 % (ref 41.1–50.3)
HGB BLD-MCNC: 12.9 G/DL (ref 13.6–17.2)
IMM GRANULOCYTES # BLD AUTO: 0 K/UL (ref 0–0.5)
IMM GRANULOCYTES NFR BLD AUTO: 0 % (ref 0–5)
LYMPHOCYTES # BLD: 1.8 K/UL (ref 0.5–4.6)
LYMPHOCYTES NFR BLD: 16 % (ref 13–44)
MCH RBC QN AUTO: 28.5 PG (ref 26.1–32.9)
MCHC RBC AUTO-ENTMCNC: 32.5 G/DL (ref 31.4–35)
MCV RBC AUTO: 87.8 FL (ref 79.6–97.8)
MONOCYTES # BLD: 1.3 K/UL (ref 0.1–1.3)
MONOCYTES NFR BLD: 12 % (ref 4–12)
NEUTS SEG # BLD: 8 K/UL (ref 1.7–8.2)
NEUTS SEG NFR BLD: 71 % (ref 43–78)
NRBC # BLD: 0 K/UL (ref 0–0.2)
PLATELET # BLD AUTO: 248 K/UL (ref 150–450)
PMV BLD AUTO: 9.6 FL (ref 9.4–12.3)
POTASSIUM SERPL-SCNC: 4.5 MMOL/L (ref 3.5–5.1)
RBC # BLD AUTO: 4.52 M/UL (ref 4.23–5.6)
SODIUM SERPL-SCNC: 138 MMOL/L (ref 136–145)
WBC # BLD AUTO: 11.2 K/UL (ref 4.3–11.1)

## 2021-04-30 PROCEDURE — 80048 BASIC METABOLIC PNL TOTAL CA: CPT

## 2021-04-30 PROCEDURE — 85025 COMPLETE CBC W/AUTO DIFF WBC: CPT

## 2021-04-30 PROCEDURE — 2709999900 HC NON-CHARGEABLE SUPPLY

## 2021-04-30 PROCEDURE — 74011250637 HC RX REV CODE- 250/637: Performed by: NURSE PRACTITIONER

## 2021-04-30 PROCEDURE — 74011250636 HC RX REV CODE- 250/636: Performed by: NURSE PRACTITIONER

## 2021-04-30 PROCEDURE — 71045 X-RAY EXAM CHEST 1 VIEW: CPT

## 2021-04-30 PROCEDURE — 74011250637 HC RX REV CODE- 250/637: Performed by: SURGERY

## 2021-04-30 PROCEDURE — 36415 COLL VENOUS BLD VENIPUNCTURE: CPT

## 2021-04-30 RX ORDER — AMOXICILLIN 250 MG
2 CAPSULE ORAL 2 TIMES DAILY
Qty: 120 TAB | Refills: 0 | Status: SHIPPED | OUTPATIENT
Start: 2021-04-30

## 2021-04-30 RX ORDER — HYDROCODONE BITARTRATE AND ACETAMINOPHEN 5; 325 MG/1; MG/1
1 TABLET ORAL
Qty: 25 TAB | Refills: 0 | Status: SHIPPED
Start: 2021-04-30 | End: 2021-05-05

## 2021-04-30 RX ORDER — GABAPENTIN 300 MG/1
300 CAPSULE ORAL 3 TIMES DAILY
Qty: 30 CAP | Refills: 0 | Status: SHIPPED | OUTPATIENT
Start: 2021-04-30 | End: 2021-05-10

## 2021-04-30 RX ADMIN — PANTOPRAZOLE SODIUM 40 MG: 40 TABLET, DELAYED RELEASE ORAL at 05:49

## 2021-04-30 RX ADMIN — LISINOPRIL 20 MG: 20 TABLET ORAL at 08:23

## 2021-04-30 RX ADMIN — GABAPENTIN 300 MG: 300 CAPSULE ORAL at 08:23

## 2021-04-30 RX ADMIN — ATORVASTATIN CALCIUM 10 MG: 10 TABLET, FILM COATED ORAL at 08:23

## 2021-04-30 RX ADMIN — SENNOSIDES AND DOCUSATE SODIUM 2 TABLET: 8.6; 5 TABLET ORAL at 08:23

## 2021-04-30 RX ADMIN — Medication 10 ML: at 05:50

## 2021-04-30 RX ADMIN — HYDROMORPHONE HYDROCHLORIDE 1 MG: 1 INJECTION, SOLUTION INTRAMUSCULAR; INTRAVENOUS; SUBCUTANEOUS at 05:49

## 2021-04-30 RX ADMIN — GUAIFENESIN 1200 MG: 600 TABLET ORAL at 08:23

## 2021-04-30 RX ADMIN — AMLODIPINE BESYLATE 10 MG: 10 TABLET ORAL at 08:23

## 2021-04-30 NOTE — PROGRESS NOTES
CM spoke with patient in room as PT has recommended [facility] [place] at discharge. Patient is agreeable to [place] services. CM provided patient with a choice list of agencies. Patient chose [facility]. CM will place order and send referral at discharge.

## 2021-04-30 NOTE — PROGRESS NOTES
PLAN:  Await pathology results- frozen section adenocarcinoma  DIET REGULAR  SCD/IS/SCD's or Sequential Compression Device and Lovenox for prophylactic measures  Pain/nausea control  Monitor labs  Anterior chest tubes to water seal - DC  Posterior chest tube out 4/29  Emanuel out 4/29  Daily CXR  Wean O2  Hopefully home later today      ASSESSMENT:  Admit Date: 4/28/2021   2 Day Post-Op  Procedure(s):  RIGHT THORACOSCOPY VATS, WEDGE RESECTION WITH FROZEN SECTION, LOWER LOBECTOMY, MEDIASTINAL  LYMPHADENECTOMY, CRYOABLATION OF INTERCOSTAL NERVES    Principal Problem:    Lung mass (4/28/2021)    Active Problems:    Bronchogenic cancer of right lung ([facility]) (4/28/2021)         SUBJECTIVE:  04/29/21-  1 Day Post-Op awake in bed, no complaints. Pain controlled. AF, VSS. Chest tubes without air leak noted. 04/30/21-  2 Day Post-Op awake in bed, no complaints. Pain controlled. AF, VSS. Chest tube without air leak noted. CXR this am showing No significant interval change. Intake/Output Summary (Last 24 hours) at 4/30/2021 4903  Last data filed at 4/30/2021 0600  Gross per 24 hour   Intake 950 ml   Output 2725 ml   Net -1775 ml     OBJECTIVE:  Constitutional: Alert oriented cooperative patient in no acute distress; appears stated age   Visit Vitals  /68   Pulse 66   Temp 97.5 °F (36.4 °C)   Resp 20   Ht 6' (1.829 m)   Wt 250 lb (113.4 kg)   SpO2 99%   BMI 33.91 kg/m²     Eyes:Sclera are clear. ENMT: no external lesions gross hearing normal; no obvious neck masses, no ear or lip lesions  CV: RRR. Normal perfusion  Resp: No JVD. Breathing is  non-labored; no audible wheezing. Chest tube  to water seal- no air leak noted    GI: soft and non-distended     Musculoskeletal: unremarkable with normal function. No embolic signs or cyanosis.    Neuro:  Oriented; moves all 4; no focal deficits  Psychiatric: normal affect and mood, no memory impairment      Patient Vitals for the past 24 hrs:   BP Temp Pulse Resp SpO2 04/30/21 0656 109/68 97.5 °F (36.4 °C) 66 20 99 %   04/30/21 0433 127/73 98.4 °F (36.9 °C) 65 18 96 %   04/29/21 2332 111/71 98.4 °F (36.9 °C) 73 18 95 %   04/29/21 1945 123/72 98.8 °F (37.1 °C) 72 18 95 %   04/29/21 1500 127/75 97.9 °F (36.6 °C) 69 19 97 %   04/29/21 1155 (!) 151/82 98.1 °F (36.7 °C) 70 19 97 %     Labs:    Recent Labs     04/30/21  0531   WBC 11.2*   HGB 12.9*         K 4.5      CO2 26   BUN 20   CREA 1.25   *       Signed:  Saira Torrez NP

## 2021-04-30 NOTE — DISCHARGE INSTRUCTIONS
Patient Education        Lung Resection: What to Expect at Home  Your Recovery  Lung resection is surgery to remove part or all of your lung. It is used to treat a damaged or diseased lung. It is common to feel tired for 6 to 8 weeks after surgery. Your chest may hurt and be swollen for up to 6 weeks. It may ache or feel stiff for up to 3 months. For up to 3 months, you may also feel tightness, itching, numbness, or tingling around the cut (incision) the doctor made. Your doctor will give you medicines to help with pain. You may have stitches or staples in the incision. Your doctor will take these out 1 to 2 weeks after your surgery. You may have one or more tubes coming out of your chest to drain fluids. Your doctor will probably take these out about 1 week after surgery. After surgery, you will probably feel short of breath. Your doctor, nurse, or respiratory therapist will teach you deep-breathing and coughing exercises to help your body get as much oxygen as possible. At first, you also may need to get extra oxygen through a mask or a plastic tube in your nostrils (nasal cannula). This care sheet gives you a general idea about how long it will take for you to recover. But each person recovers at a different pace. Follow the steps below to get better as quickly as possible. How can you care for yourself at home? Activity    · Rest when you feel tired. Getting enough sleep will help you recover.     · Try to walk each day. Start by walking a little more than you did the day before. Bit by bit, increase the amount you walk. Walking boosts blood flow and helps prevent pneumonia and constipation.     · Avoid strenuous activities, such as bicycle riding, jogging, weight lifting, or aerobic exercise, for 6 to 8 weeks, or until your doctor says it is okay.  Also for 6 to 8 weeks, avoid swimming, tennis, golf, or other activities that could strain your arm and shoulder muscles.     · For 6 to 8 weeks, avoid lifting anything over 5 pounds or that would make you strain. This may include a child, heavy grocery bags and milk containers, a heavy briefcase or backpack, cat litter or dog food bags, or a vacuum .     · You may be able to take showers (unless you have a drain near your incision). If you have a drain, follow your doctor's instructions to empty and care for it. Do not take a bath for the first 2 weeks, or until your doctor tells you it is okay.     · Ask your doctor when you can drive again.     · You will probably need to take 1 to 2 months off from work. It depends on the type of work you do and how you feel.     · Do not fly in an airplane or dive deeply (such as in scuba diving) until your doctor tells you it is okay. Avoid any situations where there is increased air pressure. Diet    · You can eat your normal diet. If your stomach is upset, try bland, low-fat foods like plain rice, broiled chicken, toast, and yogurt.     · Ask your doctor how much fluid you should drink.     · You may notice that your bowel movements are not regular right after your surgery. This is common. Try to avoid constipation and straining with bowel movements. You may want to take a fiber supplement every day. If you have not had a bowel movement after a couple of days, ask your doctor about taking a mild laxative. Medicines    · Your doctor will tell you if and when you can restart your medicines. You will also get instructions about taking any new medicines.     · If you take aspirin or some other blood thinner, ask your doctor if and when to start taking it again. Make sure that you understand exactly what your doctor wants you to do.     · Be safe with medicines. Take pain medicines exactly as directed. ? If the doctor gave you a prescription medicine for pain, take it as prescribed.   ? If you are not taking a prescription pain medicine, ask your doctor if you can take an over-the-counter medicine.     · If you think your pain medicine is making you sick to your stomach:  ? Take your medicine after meals (unless your doctor has told you not to). ? Ask your doctor for a different pain medicine.     · If your doctor prescribed antibiotics, take them as directed. Do not stop taking them just because you feel better. You need to take the full course of antibiotics. Incision care    · If you have strips of tape on the incision, leave the tape on for a week or until it falls off.     · Wash the area daily with warm, soapy water and pat it dry. Other cleaning products, such as hydrogen peroxide, can make the wound heal more slowly. You may cover the area with a gauze bandage if it weeps or rubs against clothing. Change the bandage every day.     · Keep the area clean and dry.     · Wear clean, loose clothing over your incision. Exercise    · To help keep your lungs clear, cough and do deep breathing exercises as instructed by your doctor, nurse, or respiratory therapist.     · Your doctor may send you home with an incentive spirometer. This is a device that helps you practice taking deep breaths. This can help keep your lungs healthy.     · Ask your doctor about shoulder exercises to keep the muscles near your chest strong and flexible. Other instructions    · Do not smoke or allow others to smoke around you. If you need help quitting, talk to your doctor about stop-smoking programs and medicines. These can increase your chances of quitting for good.     · Try to avoid being around people who have a cold, the flu, or other illnesses. Follow-up care is a key part of your treatment and safety. Be sure to make and go to all appointments, and call your doctor if you are having problems. It's also a good idea to know your test results and keep a list of the medicines you take. When should you call for help? Call 911 anytime you think you may need emergency care. For example, call if:    · You passed out (lost consciousness).      · You have severe trouble breathing.     · You have sudden chest pain and shortness of breath, or you cough up blood. Call your doctor now or seek immediate medical care if:    · You are sick to your stomach or cannot keep fluids down.     · You have pain that does not get better after you take pain medicine.     · You have a fever over 100°F.     · You have signs of infection, such as:  ? Increased pain, swelling, warmth, or redness. ? Red streaks leading from the incision. ? Pus draining from the incision. ? Swollen lymph nodes in your neck, armpits, or groin. ? A fever.     · You have loose stitches, or your incision comes open.     · Bright red blood has soaked through the bandage over your incision.     · You cough up a lot more mucus than normal, or the mucus changes color. Watch closely for any changes in your health, and be sure to contact your doctor if:    · Fluid leaks around the drain in your chest, or you have a sudden increase in the amount of fluid that comes out of the drain. Where can you learn more? Go to http://www.gray.com/  Enter G498 in the search box to learn more about \"Lung Resection: What to Expect at Home. \"  Current as of: October 26, 2020               Content Version: 12.8  © 7750-0801 Healthwise, [facility]. Care instructions adapted under license by [employer] (which disclaims liability or warranty for this information). If you have questions about a medical condition or this instruction, always ask your healthcare professional. [place] [address] any warranty or liability for your use of this information. Follow-up with Dr. Kari Londono in 1 week. Keep incisions clean and dry. Leave dressing in place x 48 hours, then ok to remove  Do not apply lotions, creams or ointments to incisions.     Diet - as tolerated - Regular diet. Activity - ambulate - as tolerated - no heavy lifting >10lb.   May shower - no tub baths or soaking/submerging. Continue stool softener two times daily, Has Rx or can get over the counter   If no BM, start MOM on Saturday - take daily until BM. No driving while taking narcotics. Do not drink alcohol while taking narcotics.   Resume other home medications.      If problems or questions arise, please call our office at [phone].     Greater than 30 minutes were spent discharging the patient

## 2021-04-30 NOTE — PROGRESS NOTES
Care Management Interventions  PCP Verified by CM: Yes(Dr. Tee Gr)  Mode of Transport at Discharge: Self  Transition of Care Consult (CM Consult): Discharge Planning  Discharge Durable Medical Equipment: No  Physical Therapy Consult: Yes  Occupational Therapy Consult: No  Speech Therapy Consult: No  Current Support Network: Own Home, Lives with Spouse, Family Lives Nearby  Confirm Follow Up Transport: Self   Resource Information Provided?: No  Discharge Location  Discharge Placement: Home    Patient to discharge home with Emily . CM sent referral. Patient to transport home with family. All milestones for discharge have been met. CM remains available should any needs arise.

## 2021-04-30 NOTE — PROGRESS NOTES
Problem: Falls - Risk of  Goal: *Absence of Falls  Description: Document Yao Purvis Fall Risk and appropriate interventions in the flowsheet.   Outcome: Progressing Towards Goal  Note: Fall Risk Interventions:  Mobility Interventions: Communicate number of staff needed for ambulation/transfer    Mentation Interventions: Adequate sleep, hydration, pain control    Medication Interventions: Patient to call before getting OOB    Elimination Interventions: Call light in reach

## 2021-04-30 NOTE — DISCHARGE SUMMARY
Physician Discharge Summary     Patient ID:  Chace Senior  [id number]  60 y.o.  1955    Allergies: Patient has no known allergies. Admit Date: 4/28/2021    Discharge Date: 4/30/2021     HPI: Chace Senior is a 60 y.o. male who was referred by Dr. Aakash Delaney for evaluation of a lung mass involving the Right lower lobe. The tumor was identified incidentally with CT scan routine for his history of prostate cancer. This lesion has been followed for a year and recently has shown enlargement. EUS preformed by Dr. Rosalee Weeks. The pathology showed atypical cells. He denies chest pain, shortness of breath, cough and hemoptysis. He recently was seen by cardiology regarding chest pain and felt non cardiac chest pain and no further work up needed. He does states he recently received covid shot and developed a rash and swelling of his left lower leg. Ultrasound preformed for negative DVT and he was placed on antibiotics. He denies any cardiac history or pulmonary history. He  does have a history of smoking- quit smoking 1995. He does not have a history of drinking.     He does have a family history of cancer. Mother colon cancer, Father prostate cancer and Brother prostate and lung cancer  Medical history -HTN and prostate cancer- treated with radiation. He has not had previous chest surgery. does take blood thinner.- 81 mg ASA    Pt underwent Right thoracoscopy with wedge resection and intraoperative frozen, Right thoracoscopy, completion lower lobectomy, Mediastinal lymphadenectomy, Cryoablation of intercostal nerves, and Utility thoracotomy.     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]    * Discharge Diagnoses:    Hospital Problems as of 4/30/2021 Date Reviewed: 4/13/2021          Codes Class Noted - Resolved POA    * (Principal) Lung mass ICD-10-CM: R91.8  ICD-9-CM: 786.6  4/28/2021 - Present Unknown        Bronchogenic cancer of right lung ([facility] ICD-10-CM: C34.91  ICD-9-CM: 162.9  4/28/2021 - Present Unknown               Admission Condition: Stable    * Discharge Condition: good    * Procedures: Procedure(s):  RIGHT THORACOSCOPY VATS, WEDGE RESECTION WITH FROZEN SECTION, LOWER LOBECTOMY, MEDIASTINAL  LYMPHADENECTOMY, CRYOABLATION OF INTERCOSTAL NERVES    * Hospital Course:   Normal hospital course for this procedure. Consults: None    Significant Diagnostic Studies: labs and radiology    * Disposition: Home    Discharge Medications:   Current Discharge Medication List      START taking these medications    Details   senna-docusate (PERICOLACE) 8.6-50 mg per tablet Take 2 Tabs by mouth two (2) times a day. Qty: 120 Tab, Refills: 0    Associated Diagnoses: Bronchogenic cancer of right lung (HCC)      gabapentin (NEURONTIN) 300 mg capsule Take 1 Cap by mouth three (3) times daily for 10 days. Qty: 30 Cap, Refills: 0      HYDROcodone-acetaminophen (NORCO) 5-325 mg per tablet Take 1 Tab by mouth every four (4) hours as needed for Pain for up to 5 days. Max Daily Amount: 6 Tabs. Qty: 25 Tab, Refills: 0    Associated Diagnoses: Bronchogenic cancer of right lung ([facility]         CONTINUE these medications which have NOT CHANGED    Details   mv-mn-folic ac-vit K-herb 698 (Alive Once Daily Women 50 Plus) 800-100 mcg tab Take  by mouth. OTHER daily. Alive multivitamin for men 50+ gummies      omega-3 fatty acids (FISH OIL CONCENTRATE PO) Take  by mouth daily. Fish oil 1400      clobetasoL (TEMOVATE) 0.05 % topical cream Apply  to affected area two (2) times a day. tadalafiL (CIALIS) 20 mg tablet Take 1 Tab by mouth as needed (take as needed prior to sexual activity). Take 1/2 to 1 tab as needed prior to sexual activity  Qty: 20 Tab, Refills: 2      simvastatin (ZOCOR) 20 mg tablet Take 20 mg by mouth daily. amLODIPine-benazepril (LOTREL) 10-20 mg per capsule Take 1 Cap by mouth daily. cholecalciferol (VITAMIN D3) 1,000 unit tablet Take  by mouth daily. fluocinoNIDE (LIDEX) 0.05 % ointment APPLY TOPICALLY TO THE AFFECTED AREA TWICE DAILY FOR 7 TO 10 DAYS AS NEEDED      hydrOXYzine HCL (ATARAX) 25 mg tablet TAKE 1 TABLET BY MOUTH EVERY NIGHT AT BEDTIME         STOP taking these medications       doxycycline (VIBRAMYCIN) 100 mg capsule Comments:   Reason for Stopping:               * Follow-up Care/Patient Instructions: Activity: Activity as tolerated  Diet: Regular Diet  Wound Care: Keep wound clean and dry    Follow-up Information     Follow up With Specialties Details Why Lucinda Brown MD General Surgery On 5/7/2021 11:00 Michelle Sue 19 [phone]      Payam De Los Santos MD [facility]   [address] [address] [phone]          Discharge Instructions/Follow-up Plans:   MD Instructions:     Follow-up with Dr. Jayna Mendosa in 1 week. Keep incisions clean and dry. Leave dressing in place x 48 hours, then ok to remove  Do not apply lotions, creams or ointments to incisions.     Diet - as tolerated - Regular diet. Activity - ambulate - as tolerated - no heavy lifting >10lb. May shower - no tub baths or soaking/submerging. Continue stool softener two times daily, Has Rx or can get over the counter   If no BM, start MOM on Saturday - take daily until BM. No driving while taking narcotics. Do not drink alcohol while taking narcotics.   Resume other home medications.      If problems or questions arise, please call our office at [phone].     Greater than 30 minutes were spent discharging the patient        Signed:  Prashant Lyman NP  4/30/2021  11:05 AM

## 2021-04-30 NOTE — PROGRESS NOTES
END OF SHIFT NOTE:    Hourly rounds conducted. INTAKE/OUTPUT  04/29 0701 - 04/30 0700  In: 950 [P.O.:950]  Out: 2735 [Urine:2325]  Voiding: YES  Catheter: NO  Drain:              Flatus: Patient does have flatus present. Stool:  0 occurrences. Characteristics:  Stool Assessment  Stool Appearance: (have not observed)    Emesis: 0 occurrences. Characteristics:        VITAL SIGNS  Patient Vitals for the past 12 hrs:   Temp Pulse Resp BP SpO2   04/30/21 0433 98.4 °F (36.9 °C) 65 18 127/73 96 %   04/29/21 2332 98.4 °F (36.9 °C) 73 18 111/71 95 %   04/29/21 1945 98.8 °F (37.1 °C) 72 18 123/72 95 %       Pain Assessment  Pain Intensity 1: 10 (04/30/21 0549)  Pain Location 1: Flank  Pain Intervention(s) 1: Declines  Patient Stated Pain Goal: 0    Ambulating  Yes    Shift report given to oncoming nurse at the bedside.     Stanley Hong

## 2021-04-30 NOTE — ROUTINE PROCESS
Pt D/C from unit with belongings and RX. Accompanied by family and hospital personnel. No distress at time of D/C. Transported via w/c.

## 2021-10-07 ENCOUNTER — HOSPITAL ENCOUNTER (OUTPATIENT)
Dept: RADIATION ONCOLOGY | Age: 66
Discharge: HOME | End: 2021-10-07
Payer: MEDICARE

## 2021-10-07 DIAGNOSIS — C61 PROSTATE CANCER (HCC): ICD-10-CM

## 2021-10-07 PROCEDURE — 84403 ASSAY OF TOTAL TESTOSTERONE: CPT

## 2021-10-07 PROCEDURE — 36415 COLL VENOUS BLD VENIPUNCTURE: CPT

## 2021-10-07 PROCEDURE — 84153 ASSAY OF PSA TOTAL: CPT

## 2021-10-08 LAB
PSA SERPL DL<=0.01 NG/ML-MCNC: 0.08 NG/ML (ref 0–4)
TESTOST SERPL-MCNC: 192 NG/DL (ref 264–916)

## 2021-10-14 DIAGNOSIS — C61 PROSTATE CANCER (HCC): Primary | ICD-10-CM

## 2021-11-08 ENCOUNTER — HOSPITAL ENCOUNTER (OUTPATIENT)
Dept: CT IMAGING | Age: 66
Discharge: HOME | End: 2021-11-08
Attending: SURGERY

## 2021-11-08 DIAGNOSIS — C34.91 BRONCHOGENIC CANCER OF RIGHT LUNG (HCC): ICD-10-CM

## 2021-11-08 RX ORDER — SODIUM CHLORIDE 0.9 % (FLUSH) 0.9 %
10 SYRINGE (ML) INJECTION
Status: ACTIVE | OUTPATIENT
Start: 2021-11-08 | End: 2021-11-08

## 2022-03-19 PROBLEM — C34.91 BRONCHOGENIC CANCER OF RIGHT LUNG (HCC): Status: ACTIVE | Noted: 2021-04-28

## 2022-03-19 PROBLEM — N52.9 ERECTILE DYSFUNCTION: Status: ACTIVE | Noted: 2017-12-19

## 2022-03-20 PROBLEM — R91.8 LUNG MASS: Status: ACTIVE | Noted: 2021-04-28

## 2022-04-18 ENCOUNTER — HOSPITAL ENCOUNTER (OUTPATIENT)
Dept: RADIATION ONCOLOGY | Age: 67
Discharge: HOME | End: 2022-04-18
Payer: MEDICARE

## 2022-04-18 DIAGNOSIS — C61 PROSTATE CANCER (HCC): ICD-10-CM

## 2022-04-18 PROCEDURE — 36415 COLL VENOUS BLD VENIPUNCTURE: CPT

## 2022-04-18 PROCEDURE — 84153 ASSAY OF PSA TOTAL: CPT

## 2022-04-18 PROCEDURE — 84403 ASSAY OF TOTAL TESTOSTERONE: CPT

## 2022-04-19 LAB
PSA SERPL DL<=0.01 NG/ML-MCNC: 0.05 NG/ML (ref 0–4)
TESTOST SERPL-MCNC: 266 NG/DL (ref 264–916)

## 2022-04-22 DIAGNOSIS — C61 PROSTATE CANCER (HCC): Primary | ICD-10-CM

## 2022-05-09 ENCOUNTER — HOSPITAL ENCOUNTER (OUTPATIENT)
Dept: CT IMAGING | Age: 67
Discharge: HOME | End: 2022-05-09
Attending: SURGERY

## 2022-05-09 DIAGNOSIS — C34.91 BRONCHOGENIC CANCER OF RIGHT LUNG (HCC): ICD-10-CM

## 2022-05-09 RX ORDER — SODIUM CHLORIDE 0.9 % (FLUSH) 0.9 %
10 SYRINGE (ML) INJECTION
Status: COMPLETED | OUTPATIENT
Start: 2022-05-09 | End: 2022-05-09

## 2022-05-09 RX ADMIN — Medication 10 ML: at 09:56

## 2022-12-19 NOTE — PROGRESS NOTES
CT of chest show pulmonary nodules on right lower lobe of lung. It is unclear, what this nodule is. Referral to pulmonary is recommended. Referral placed. Admission Reconciliation is Completed  Discharge Reconciliation is Completed

## 2023-01-31 ENCOUNTER — OFFICE VISIT (OUTPATIENT)
Dept: ORTHOPEDIC SURGERY | Age: 68
End: 2023-01-31
Payer: COMMERCIAL

## 2023-01-31 VITALS — BODY MASS INDEX: 33.5 KG/M2 | WEIGHT: 247 LBS

## 2023-01-31 DIAGNOSIS — M54.50 LOW BACK PAIN, UNSPECIFIED BACK PAIN LATERALITY, UNSPECIFIED CHRONICITY, UNSPECIFIED WHETHER SCIATICA PRESENT: ICD-10-CM

## 2023-01-31 DIAGNOSIS — M48.062 LUMBAR STENOSIS WITH NEUROGENIC CLAUDICATION: Primary | ICD-10-CM

## 2023-01-31 DIAGNOSIS — M48.061 FORAMINAL STENOSIS OF LUMBAR REGION: ICD-10-CM

## 2023-01-31 DIAGNOSIS — M51.36 DDD (DEGENERATIVE DISC DISEASE), LUMBAR: ICD-10-CM

## 2023-01-31 PROCEDURE — 1123F ACP DISCUSS/DSCN MKR DOCD: CPT | Performed by: PHYSICIAN ASSISTANT

## 2023-01-31 PROCEDURE — 99214 OFFICE O/P EST MOD 30 MIN: CPT | Performed by: PHYSICIAN ASSISTANT

## 2023-01-31 RX ORDER — ALBUTEROL SULFATE 90 UG/1
2 AEROSOL, METERED RESPIRATORY (INHALATION)
COMMUNITY
Start: 2022-04-22

## 2023-01-31 RX ORDER — B-COMPLEX WITH VITAMIN C
1 TABLET ORAL
COMMUNITY

## 2023-01-31 NOTE — LETTER
Tati Counter                                                     LENORA SCHULZ  1955  MRN [id number]                                              ROOM NUMBER______      Radiographic Studies:    Cervical MRI      Thoracic MRI         Lumbar MRI          Pelvis MRI        CONTRAST    CT Myelogram: _______________   NCS/EMG ________________ ( UE  /  LE )     MRI of ___________________          Other: ____________________      Injections:    KNEE    HIP  Depomedrol _____ mg Euflexxa _____    _______________ TFESI/SNRB  _______________ SI Joint  _______________ KARL    _______________ Facet  _______________Piriformis/ Sciatica      Medications:    Oral Steroids _______________  NSAIDS _______________    Muscle Relaxers _______________  Neurontin/Lyrica _______________    Pain Medicine _______________  Other _______________                       Physical Therapy:    Lumbar     Thoracic      Cervical     Hip       Knee       Shoulder               Traction          Ultrasound          Dry Needling      Referral:    Pain referral:  [facility]   [facility]   Other: ______________________________    Follow-up/ Refer__________________________________________________    Authorization to hold blood thinners:___________________________________

## 2023-01-31 NOTE — PATIENT INSTRUCTIONS
Epidural Steroid Injection / Selective Nerve Root Block  What is it? An epidural steroid injection (KARL) is an injection of an anti-inflammatory medication directly on the sac that covers the spinal cord and nerves. A Selective Nerve Root Block (SNRB) is an injection that is directed around a specific nerve. Your physician usually orders an injection  when you have symptoms of an irritated spinal nerve. These symptoms can include back, buttock or leg pain, weakness, or numbness. Irritated spinal nerves are often caused by disc herniations or a tight spinal canal (stenosis). The goal of the steroid injection is to reduce the inflammation around the spinal cord and nerves, which should reduce the amount of back and leg pain you are experiencing. Epidural injections are often done in series. It would not be unusual to have two or three injections in a row 10 to 14 days apart. The reason for multiple injections is that the relief from the injection may wear off over time. And sometimes it takes multiple doses of steroid injection to obtain the most anti-inflammatory effect around the nerves. How is it performed? You will be asked to lie on your side or stomach on the x-ray table. This will allow the physician to position you in the best way possible to access your back. Next, the skin will be cleaned and numbed with a local anesthetic. An X-ray machine will then be used to guide a small gauge needle into the space over your spinal sac. A small amount of dye will then be injected to insure the needle is in the proper position. Finally, a mixture of numbing medicine and anti-inflammatory (steroid/cortisone) will be injected. How long does it take? All together it should take about 1 hour. The back and legs may feel weak or numb for a couple of hours after the injection. Plan the have someone drive you home. Are there any restrictions after the KARL?    Try to spend the remainder of the evening resting as much as possible. You may return to your normal activities the day after the procedure, including returning to work. What are the risks? The most common risk is a spinal headache. This happens when the needle passes through the spinal sac and can result in leakage of spinal fluid. This is usually treated with lying in a flat position and high fluid intake. Rarely, spinal headaches lasting more than a few days can be treated with a small procedure called a blood patch. Another risk, though very small, is the injection of the medication into one of the tiny blood vessels in the spinal canal.  This can result in serious complications such as seizures, cardiac arrest and even death. Fortunately, these complications are quite rare. Other rare complications include infection, bleeding into the spinal canal (epidural hematoma), loss of bladder control, and injury to a nerve with the needle. Who should not have an KARL? The injection of a steroid anywhere in the body can cause a significant increase in the blood sugar level. Diabetics are very sensitive to steroids and should have them administered with caution. Diabetic patients can have injections but need to watch their blood sugar after the injection and discuss with their Primary Care Physician a plan to manage elevations in blood sugar. Steroids also decrease the body's ability to fight infection. Thus, any person with an active infection should not take a steroid medication until the infection has been cleared completely. If you are taking an antibiotic for an active infection, please notify our office.    Additionally, some patients may have anatomic abnormalities or have had previous back surgeries which may not allow the needle to pass into the spinal canal.     Medications that result in thinning of the blood such as coumadin, aspirin, Plavix, Eliquis, Xarelto and many anti-inflammatory medications need to be discontinued 5-7 days prior to the injection. Check with your doctor regarding specific drugs you are taking. Med    [facility]    MD Shady Mittal MD Amy [place], PA-C Ellene Edge, NP    Main Office  [address] [facility],[address], [address] [facility] [place] [address]       For Appointments at either location, please call [phone] that result in thinning of blood such as Coumadin, Aspirin, Plavix, and many anti-inflammatories, need to Pre procedure teaching sheet: Epidural spinal injection, selective nerve root block injection, sacroiliac injection and facet block injections. You have been scheduled for spinal injection. This injection is to help decrease her back and or leg pain. A local anesthetic will be used to numb the area. Then, a spinal needle will be placed in the appropriate place according to the type of injection ordered by your physician. A steroid with or without local anesthetic will be injected. A small amount of contrast dye will be used to better visualize the injection site. You will be lying on your stomach. A series of 3 injections may be performed as these are ordered 2 to 3 weeks apart. Be aware, steroids can take 2 to 3 days to begin working, but can take 7 to 10 days for full effectiveness. Therefore, you may not have relief immediately. Please be patient and give the injection time to work. You should not resume any type of strenuous workout routine for at least 3 days following the procedure. Walking is fine. Prior to your procedure    - Please call your primary care physician if you are on blood thinners such as Coumadin, warfarin, heparin, Plavix, Lovenox, Effient, Eliquis, Xarelto, Brilinta, or aspirin or other blood thinner as these will need to be stopped for 3 to 7 days before the injections.   We will need verbal approval to stop the thinners and proceed with the injection from the physician treating you with the blood thinners prior to the appointment date. If your physician tells you it is not safe to stop the blood thinner, you must call our office and discuss this with us. We may need to cancel the procedure. - Please do not take any nonsteroidal anti-inflammatory medications (NSAIDs) such as Advil, ibuprofen, Celebrex, meloxicam or Aleve for 3 days before your injection.    - We cannot give you an injection if you are presently taking an antibiotic. - Please continue your other medications as prescribed. -You must bring someone to drive you home. - You may eat prior to your procedure, but we asked that you do not eat a heavy meal within 2 to 4 hours of your procedure. You may drink liquids up to 1 to 2 hours before your appointment time. - If you are diabetic, please adjust your medications as appropriate. If steroid is used be aware that they may increase your blood sugar. Closely monitor your blood sugars after the procedure. - If you are sick, running a fever, have a virus or are put on antibiotics during the week before your procedure, please call to reschedule. We cannot do injections if you are sick. Day of procedure    - Arrive 15 minutes before your procedure time, register at the . - A staff member will call you from the waiting area and bring you to the exam room. - You may or may not be asked to change into shorts. Please leave valuables at home. If you wear clothing with elastic waist, you will not be required to change. - The nurse will talk with you and have you sign a consent form before your injection. If you have any allergies to Betadine, iodine, shellfish or x-ray dye, please tell the nurse at this time. - You will be positioned on the table on your stomach. A special x-ray machine is used to kamari the correct position of the needle. We will clean the area with Betadine or Hibiclens.   Sterile towels were placed around the area to be injected. - The doctor will use a local anesthetic to numb the skin. This burns like a bee sting for about 20 seconds. As the needle is advanced, you will feel pressure. - Once the needle is in the appropriate space, the medication will be injected. Once the needle is removed, your back will be cleaned. You will move back to the exam room. - You are required to stay 20 to 30 minutes following the procedure. Although not expected you may have some numbness from the local anesthetic. If this were to interfere with her walking, you will not be discharged from the office until it is safe for you to leave. - Your discharge instructions and follow-up care will be discussed with you prior to leaving the office.           PRODUCTS THAT MAY THIN THE BLOOD    Medications, over-the-counter medications and herbal supplements    Aches-N-Pain    Disalcid   Meclenofenamate   Plavix  Acetylsalicylic acid (ASA)  Sam's Pills   Meclomen    Ponstel  Actron     Dolobid   Medipren    Relefen  Advil     Dristan    Mefenamic acid   Robaxisal  Aleve     Ecotrin    Meloxicam    Rufen  Susi-Lafayette    Empirin   Menadol    Saleto  Anacin     Equagesic   Methocarbamol   Salsalate  Anaprox    Etodolac   Midol     Sine-aid  Ansaid     Excedrin   Mobic     Sine-off  Arthritis Pain formula   Feldene   Motrin     Sominex  Ascriptin    Fenoprofen   Nabumetone    Stanback  Aspergum    Feverfew   Nalfon     Sulindac  Aspirin     Florinal   Naprosyn    Talwin Compound  [place]     Flurbiprofen   Naproxen    Ticlid  BC Powders    Genpril    Norgesic    iclopidine  Bufferin    Goody's   Nuprin     Tolectin  Cataflam    Haltrin    Nyquil     Tolmetin  Clinoril     IBU    Orudis     Toradol  Clopidogrel    Ibuprofen   Oruvail     Trental  Coricidin    Indocin    Oxaprozin    Triclosan  Coumadin    Indomethacin   Pamprin    Vanquish  Darvon Compound   Ketoprofen   Pepto Bismol    Vitamin - E  Daypro     Ketorolac   Percodan    Voltaren  Diflunisal Kaopectate   Lovenox   Piroxicam    Warfarin    Diclofenac Lodine       Phenaphen    Xarelto  Eliquis       Enoxaparin       Lumbar Stenosis    What is lumbar stenosis? Stenosis is defined as the narrowing of the spinal canal. The term lumbar simply refers to the lowest 5 vertebrae in the spine. Externally this corresponds to the small of the back just above your buttocks. Each lumbar vertebra has 3 tunnels which can be affected by stenosis. The large tunnel in the middle of the vertebra is where the spinal cord and all of the spinal nerves are contained. Also, a much smaller tunnel is on each side of the vertebra. This is where the individual nerves exit the spine. Narrowing of any of these tunnels can result in pressure on the spinal cord or spinal nerves. Spinal stenosis may involve multiple levels of the spine or may be localized to a single level. What causes spinal stenosis? Typically the tunnels in vertebrae are quite spacious and much larger than the spinal cord and nerves that pass through them. However, some patients are genetically programmed to have smaller size tunnels in the spine, predisposing them to develop symptoms from spinal stenosis. There are several other potential causes of spinal stenosis. A single event, such as a disc herniation or a fracture can cause symptoms of stenosis. But more often, it is caused by arthritic changes, such as bone spurs, thickened ligaments, joint laxity, and disc bulges. This results in pinched spinal nerves which gives symptoms of buttock and leg pain and weakness. What are the symptoms of spinal stenosis? Patients will typically have low back pain along with pain in the buttocks and legs. This usually affects patients more when they are standing or walking, and their pain is often relieved with sitting or lying.  Many patients report that the decrease in their ability to walk is the most bothersome part of the condition. And, some have found that leaning forward (such as using a cart while shopping) has enabled them to go further with less pain. Are there other conditions that can cause similar symptoms? The condition which most closely mimics spinal stenosis is poor blood circulation to the legs (vascular claudication). Other conditions such as diabetic neuropathy and hip or knee arthritis also have very similar symptoms to spinal stenosis. What is the prognosis? The natural history of degenerative spinal stenosis is usually a slow progression, gradually reducing the ability to stand or walk for extended periods. What treatments are available? The use of anti-inflammatory medications may give partial relief of symptoms. Physical therapy is often prescribed initially, which may improve lumbar range of motion and strength. Chiropractic care may help ease early symptoms in some patients. A series of steroid injections into the spine may calm the inflammation of the nerves and give temporary relief of the buttock and leg symptoms. Though, these treatments may help the patient cope with the symptoms for several years, they have little effect on the natural history of the disease which is slow progression. When should I consider consulting a spine surgeon? When you are no longer able to tolerate the symptoms or it is interfering with your everyday activities despite the use of conservative treatments, you may be a good candidate for a decompression type of spine surgery. The procedure typically performed is called a laminectomy. Some patients may require a fusion in addition to the laminectomy if there is too much joint laxity from the arthritic changes in the spine. A decompression operation for spinal stenosis is about 80% effective in reducing your buttock and leg symptoms, including your ability to stand and walk.   Though there may be some reduction in low back pain, a laminectomy is much less predictable for the treatment of isolated back pain without symptoms in the buttocks or legs.       [facility]    MD Priscilla Lee, NANCY Hurtado NP    Main Office  [address] [facility],[address], [facility], [facility] [address]        For Appointments at either location, please call [phone]

## 2023-01-31 NOTE — PROGRESS NOTES
A referral for spine injection has been ordered. An order for PT on the Lumbar Spine has been entered.

## 2023-02-06 ENCOUNTER — OFFICE VISIT (OUTPATIENT)
Dept: ORTHOPEDIC SURGERY | Age: 68
End: 2023-02-06

## 2023-02-06 DIAGNOSIS — M48.062 LUMBAR STENOSIS WITH NEUROGENIC CLAUDICATION: ICD-10-CM

## 2023-02-06 DIAGNOSIS — M51.36 DDD (DEGENERATIVE DISC DISEASE), LUMBAR: Primary | ICD-10-CM

## 2023-02-06 RX ORDER — BETAMETHASONE SODIUM PHOSPHATE AND BETAMETHASONE ACETATE 3; 3 MG/ML; MG/ML
12 INJECTION, SUSPENSION INTRA-ARTICULAR; INTRALESIONAL; INTRAMUSCULAR; SOFT TISSUE ONCE
Status: COMPLETED | OUTPATIENT
Start: 2023-02-06 | End: 2023-02-06

## 2023-02-06 RX ADMIN — BETAMETHASONE SODIUM PHOSPHATE AND BETAMETHASONE ACETATE 12 MG: 3; 3 INJECTION, SUSPENSION INTRA-ARTICULAR; INTRALESIONAL; INTRAMUSCULAR; SOFT TISSUE at 11:08

## 2023-02-06 NOTE — PROGRESS NOTES
Name: Chago Johnson  YOB: 1955  Gender: male  MRN: [id number]        Interlaminar KARL Procedure Note      Procedure: L4-L5 interlaminar epidural steroid injection    Precautions: Chago Johnson denies prior sensitivity to steroid, local anesthetic, iodine, or shellfish. Consent:  Consent was obtained prior to the procedure. The procedure was discussed at length with Chago Johsnon. He was given the opportunity to ask questions regarding the procedure and its associated risks. In addition to the potential risks associated with the procedure itself, the patient was informed both verbally and in writing of potential side effects of the use glucocorticoids. The patient appeared to comprehend the informed consent and desired to have the procedure performed, and informed consent was signed. He was placed in a prone position on the fluoroscopy table and the skin was prepped and draped in a routine sterile fashion. The areas to be injected were each anesthetized with 1 ml of 1% Lidocaine. A 22 gauge 3.5 inch spinal needle was carefully advanced under fluoroscopic guidance to the L4-L5 interlaminar space (right paramedian). 0.5 ml of 70% of Omnipaque was injected to confirm proper needle placement and absence of subdural or vascular flow Once proper placement was confirmed, 2ml of sterile water and 12 mg of betamethasone were injected through the spinal needle. Fluoroscopic guidance was used intermittently over a 10-minute period to insure proper needle placement and his safety. A hard copy of the fluoroscopic image has been placed in his chart and is saved on the C-arm hard drive. He was monitored for 30 minutes after the procedure and discharged home in a stable fashion with a routine follow up. Procedural Diagnosis:     ICD-10-CM    1.  DDD (degenerative disc disease), lumbar  M51.36 XR INJ SPINE THER SUBST LUM/SAC W IMG     betamethasone acetate-betamethasone sodium phosphate (CELESTONE) injection 12 mg      2.  Lumbar stenosis with neurogenic claudication  M48.062 XR INJ SPINE THER SUBST LUM/SAC W IMG     betamethasone acetate-betamethasone sodium phosphate (CELESTONE) injection 12 mg         Raul Fox MD  02/06/23

## 2023-03-01 DIAGNOSIS — C34.91 MALIGNANT NEOPLASM OF UNSPECIFIED PART OF RIGHT BRONCHUS OR LUNG (HCC): Primary | ICD-10-CM

## 2023-03-24 ENCOUNTER — HOSPITAL ENCOUNTER (OUTPATIENT)
Dept: CT IMAGING | Age: 68
Discharge: HOME | End: 2023-03-27

## 2023-03-24 DIAGNOSIS — C34.91 MALIGNANT NEOPLASM OF UNSPECIFIED PART OF RIGHT BRONCHUS OR LUNG (HCC): ICD-10-CM

## 2023-04-25 ENCOUNTER — OFFICE VISIT (OUTPATIENT)
Dept: SURGERY | Age: 68
End: 2023-04-25
Payer: COMMERCIAL

## 2023-04-25 VITALS
HEIGHT: 72 IN | DIASTOLIC BLOOD PRESSURE: 88 MMHG | WEIGHT: 247 LBS | OXYGEN SATURATION: 97 % | HEART RATE: 81 BPM | SYSTOLIC BLOOD PRESSURE: 122 MMHG | BODY MASS INDEX: 33.46 KG/M2

## 2023-04-25 DIAGNOSIS — C34.91 BRONCHOGENIC CANCER OF RIGHT LUNG (HCC): Primary | ICD-10-CM

## 2023-04-25 PROCEDURE — 1123F ACP DISCUSS/DSCN MKR DOCD: CPT | Performed by: SURGERY

## 2023-04-25 PROCEDURE — 99214 OFFICE O/P EST MOD 30 MIN: CPT | Performed by: SURGERY

## 2023-04-26 NOTE — PROGRESS NOTES
[facility]  [address] [place] [address]  [phone]     2023  Patient:  Livier Gaffney  : 1955    HPI  Livier Gaffney is a 73 y.o. male who is seen for routine follow up of his stage I lung cancer. He had right VATS lower lobectomy to remove a stage I lung cancer on 2021. He did well from surgery, he denies any shortness of breath. He has actually no complaints today. He did have bad coughing a month ago, and was treated with antibiotics by PCP. He denies any active medical issues currently. Other medical issues are reviewed as below.           Past Medical History:   Diagnosis Date    Bilateral knee pain     BPH (benign prostatic hypertrophy)     Cellulitis 04/10/2021    cellulitis left leg currently on antibiotic    Elevated PSA     Gout          Hematuria, gross     Hypercholesteremia     medication    Hypertension     controlled with medication    Lung nodule     Obesity     bmi=33    Personal history of prostate cancer     Prostate cancer ([facility] [address] dx--10/2015    radiation only    Snoring      Current Outpatient Medications   Medication Sig Dispense Refill    albuterol sulfate HFA (PROVENTIL;VENTOLIN;PROAIR) 108 (90 Base) MCG/ACT inhaler Inhale 2 puffs into the lungs      Calcium Carbonate-Vitamin D (OYSTER SHELL CALCIUM/D) 500-5 MG-MCG TABS Take 1 tablet by mouth      amLODIPine-benazepril (LOTREL) 10-20 MG per capsule Take 1 capsule by mouth daily      vitamin D (CHOLECALCIFEROL) 25 MCG (1000 UT) TABS tablet Take by mouth daily      clobetasol (TEMOVATE) 0.05 % cream Apply topically 2 times daily      fluocinonide (LIDEX) 0.05 % ointment APPLY TOPICALLY TO THE AFFECTED AREA TWICE DAILY FOR 7 TO 10 DAYS AS NEEDED      hydrOXYzine (ATARAX) 25 MG tablet TAKE 1 TABLET BY MOUTH EVERY NIGHT AT BEDTIME      senna-docusate (PERICOLACE) 8.6-50 MG per tablet Take 2 tablets by mouth 2 times daily      simvastatin (ZOCOR) 20 MG tablet Take 1 tablet by mouth

## 2023-06-21 ENCOUNTER — OFFICE VISIT (OUTPATIENT)
Dept: PULMONOLOGY | Age: 68
End: 2023-06-21
Payer: COMMERCIAL

## 2023-06-21 VITALS
HEIGHT: 72 IN | SYSTOLIC BLOOD PRESSURE: 133 MMHG | HEART RATE: 66 BPM | OXYGEN SATURATION: 98 % | WEIGHT: 249 LBS | BODY MASS INDEX: 33.72 KG/M2 | RESPIRATION RATE: 14 BRPM | DIASTOLIC BLOOD PRESSURE: 86 MMHG

## 2023-06-21 DIAGNOSIS — J98.4 RESTRICTIVE LUNG DISEASE: ICD-10-CM

## 2023-06-21 DIAGNOSIS — R91.8 PULMONARY INFILTRATES: ICD-10-CM

## 2023-06-21 DIAGNOSIS — R05.9 COUGH, UNSPECIFIED TYPE: Primary | ICD-10-CM

## 2023-06-21 DIAGNOSIS — Z85.118 HX OF CANCER OF LUNG: ICD-10-CM

## 2023-06-21 PROCEDURE — 99214 OFFICE O/P EST MOD 30 MIN: CPT | Performed by: INTERNAL MEDICINE

## 2023-06-21 PROCEDURE — 1123F ACP DISCUSS/DSCN MKR DOCD: CPT | Performed by: INTERNAL MEDICINE

## 2023-06-21 NOTE — PROGRESS NOTES
[facility]: FOLLOW-UP Patient Office Visit Note  [address] Wendy James. [address] 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 This is nonproductive. He denies postnasal drainage or reflux. No significant wheezing or shortness of breath. He has been given cough medications by Dr. Irvin Wolf including Rubin Shanika and some type of liquid  cough medication. My records the patient is on Lotrel which includes an ACE inhibitor. He says he has been on this for 6 years. He says he is still taking this    CT scan 2/15/21 showed enlargement of the right lower lobe nodule now measuring 3.1 x 2 cm compared to 2.7 x 1.7 cm previously. He had PET scan 4/20 and  had only shown faint activity  in this area. .  Bronchoscoopy was negative but he had wedge resection which did reveal adenocarcinoma. Patient had a follow-up chest CT scan in March and this showed new reticulonodular infiltrates in the right upper lobe. He notes that he did have an infection a month previously in February and had been given antibiotics. Currently he continues to cough but nonproductively. He feels his shortness of breath is better with his inhalers. He has been given Breo but he is on a different 1 from the [place] and I suspect it may be Wixela. Again he denies postnasal drainage and reflux. No significant wheezing.     Past Medical History:   Diagnosis Date    Bilateral knee pain     BPH (benign prostatic hypertrophy)     Cellulitis 04/10/2021    cellulitis left leg currently on antibiotic    Elevated PSA     Gout          Hematuria, gross     Hypercholesteremia     medication

## 2023-07-11 RX ORDER — GABAPENTIN 300 MG/1
CAPSULE ORAL
Qty: 90 CAPSULE | Refills: 3 | Status: SHIPPED | OUTPATIENT
Start: 2023-07-11 | End: 2023-08-10

## 2023-08-07 ENCOUNTER — TELEPHONE (OUTPATIENT)
Dept: ORTHOPEDIC SURGERY | Age: 68
End: 2023-08-07

## 2023-08-07 NOTE — TELEPHONE ENCOUNTER
Spoke with patient and discussed next steps after injection. Patient is requesting MRI but I informed him that he cannot have one without seeing Louie Garay after the injection for documentation. I have scheduled for office visit with Louie Garay.

## 2023-08-08 ENCOUNTER — OFFICE VISIT (OUTPATIENT)
Dept: ORTHOPEDIC SURGERY | Age: 68
End: 2023-08-08
Payer: COMMERCIAL

## 2023-08-08 DIAGNOSIS — M48.062 LUMBAR STENOSIS WITH NEUROGENIC CLAUDICATION: Primary | ICD-10-CM

## 2023-08-08 DIAGNOSIS — M51.36 DDD (DEGENERATIVE DISC DISEASE), LUMBAR: ICD-10-CM

## 2023-08-08 DIAGNOSIS — M47.816 FACET ARTHROPATHY, LUMBAR: ICD-10-CM

## 2023-08-08 PROCEDURE — 99214 OFFICE O/P EST MOD 30 MIN: CPT | Performed by: PHYSICIAN ASSISTANT

## 2023-08-08 PROCEDURE — 1123F ACP DISCUSS/DSCN MKR DOCD: CPT | Performed by: PHYSICIAN ASSISTANT

## 2023-08-08 RX ORDER — TRAMADOL HYDROCHLORIDE 50 MG/1
50 TABLET ORAL EVERY 6 HOURS PRN
Qty: 28 TABLET | Refills: 0 | Status: SHIPPED | OUTPATIENT
Start: 2023-08-08 | End: 2023-08-15

## 2023-08-08 NOTE — PATIENT INSTRUCTIONS
Epidural Steroid Injection / Selective Nerve Root Block  What is it? An epidural steroid injection (KARL) is an injection of an anti-inflammatory medication directly on the sac that covers the spinal cord and nerves. A Selective Nerve Root Block (SNRB) is an injection that is directed around a specific nerve. Your physician usually orders an injection  when you have symptoms of an irritated spinal nerve. These symptoms can include back, buttock or leg pain, weakness, or numbness. Irritated spinal nerves are often caused by disc herniations or a tight spinal canal (stenosis). The goal of the steroid injection is to reduce the inflammation around the spinal cord and nerves, which should reduce the amount of back and leg pain you are experiencing. Epidural injections are often done in series. It would not be unusual to have two or three injections in a row 10 to 14 days apart. The reason for multiple injections is that the relief from the injection may wear off over time. And sometimes it takes multiple doses of steroid injection to obtain the most anti-inflammatory effect around the nerves. How is it performed? You will be asked to lie on your side or stomach on the x-ray table. This will allow the physician to position you in the best way possible to access your back. Next, the skin will be cleaned and numbed with a local anesthetic. An X-ray machine will then be used to guide a small gauge needle into the space over your spinal sac. A small amount of dye will then be injected to insure the needle is in the proper position. Finally, a mixture of numbing medicine and anti-inflammatory (steroid/cortisone) will be injected. How long does it take? All together it should take about 1 hour. The back and legs may feel weak or numb for a couple of hours after the injection. Plan the have someone drive you home. Are there any restrictions after the KARL?    Try to spend the remainder of

## 2023-08-08 NOTE — PROGRESS NOTES
An order for MRI of the Lumbar spine has been ordered. A referral for spine injection has been ordered.
lowest dose possible to manage pain Max Daily Amount: 200 mg     Dispense:  28 tablet     Refill:  0     Reduce doses taken as pain becomes manageable Reduce doses taken as pain becomes manageable        Orders Placed This Encounter   Procedures    MRI LUMBAR SPINE WO CONTRAST    Ambulatory Referral to Spine Injection        5 This is a chronic illness with severe exacerbation and progression      Return for lumbar injection with JPM, MRI results [facility]. Marcelo Felton PA-C  08/08/23      Elements of this note were created using speech recognition software. As such, errors of speech recognition may be present. Umesh Weber

## 2023-08-24 ENCOUNTER — OFFICE VISIT (OUTPATIENT)
Dept: ORTHOPEDIC SURGERY | Age: 68
End: 2023-08-24

## 2023-08-24 DIAGNOSIS — M47.816 FACET ARTHROPATHY, LUMBAR: ICD-10-CM

## 2023-08-24 DIAGNOSIS — M51.36 DDD (DEGENERATIVE DISC DISEASE), LUMBAR: ICD-10-CM

## 2023-08-24 DIAGNOSIS — M48.062 LUMBAR STENOSIS WITH NEUROGENIC CLAUDICATION: Primary | ICD-10-CM

## 2023-08-24 RX ORDER — BETAMETHASONE SODIUM PHOSPHATE AND BETAMETHASONE ACETATE 3; 3 MG/ML; MG/ML
12 INJECTION, SUSPENSION INTRA-ARTICULAR; INTRALESIONAL; INTRAMUSCULAR; SOFT TISSUE ONCE
Status: COMPLETED | OUTPATIENT
Start: 2023-08-24 | End: 2023-08-24

## 2023-08-24 RX ADMIN — BETAMETHASONE SODIUM PHOSPHATE AND BETAMETHASONE ACETATE 12 MG: 3; 3 INJECTION, SUSPENSION INTRA-ARTICULAR; INTRALESIONAL; INTRAMUSCULAR; SOFT TISSUE at 16:22

## 2023-08-24 NOTE — PROGRESS NOTES
Name: Juan Bell  YOB: 1955  Gender: male  MRN: [id number]        Interlaminar KARL Procedure Note      Procedure: L4-L5 interlaminar epidural steroid injection    Precautions: Juan Bell denies prior sensitivity to steroid, local anesthetic, iodine, or shellfish. Consent:  Consent was obtained prior to the procedure. The procedure was discussed at length with Juan Bell. He was given the opportunity to ask questions regarding the procedure and its associated risks. In addition to the potential risks associated with the procedure itself, the patient was informed both verbally and in writing of potential side effects of the use glucocorticoids. The patient appeared to comprehend the informed consent and desired to have the procedure performed, and informed consent was signed. He was placed in a prone position on the fluoroscopy table and the skin was prepped and draped in a routine sterile fashion. The areas to be injected were each anesthetized with 1 ml of 1% Lidocaine. A 22 gauge 3.5 inch spinal needle was carefully advanced under fluoroscopic guidance to the L4-L5 interlaminar space. 0.5 ml of 70% of Omnipaque was injected to confirm proper needle placement and absence of subdural or vascular flow Once proper placement was confirmed, 2ml of sterile water and 12 mg of betamethasone were injected through the spinal needle. Fluoroscopic guidance was used intermittently over a 10-minute period to insure proper needle placement and his safety. A hard copy of the fluoroscopic image has been placed in his chart and is saved on the C-arm hard drive. He was monitored for 30 minutes after the procedure and discharged home in a stable fashion with a routine follow up.     Procedural Diagnosis:     ICD-10-CM    1. Lumbar stenosis with neurogenic claudication  M48.062 XR INJ SPINE THER SUBST LUM/SAC W IMG     betamethasone acetate-betamethasone sodium phosphate (CELESTONE)

## 2023-09-07 ENCOUNTER — OFFICE VISIT (OUTPATIENT)
Dept: ORTHOPEDIC SURGERY | Age: 68
End: 2023-09-07
Payer: COMMERCIAL

## 2023-09-07 DIAGNOSIS — M47.816 FACET ARTHROPATHY, LUMBAR: ICD-10-CM

## 2023-09-07 DIAGNOSIS — M51.36 DDD (DEGENERATIVE DISC DISEASE), LUMBAR: ICD-10-CM

## 2023-09-07 DIAGNOSIS — M48.062 LUMBAR STENOSIS WITH NEUROGENIC CLAUDICATION: Primary | ICD-10-CM

## 2023-09-07 PROCEDURE — 99214 OFFICE O/P EST MOD 30 MIN: CPT | Performed by: PHYSICIAN ASSISTANT

## 2023-09-07 PROCEDURE — 1123F ACP DISCUSS/DSCN MKR DOCD: CPT | Performed by: PHYSICIAN ASSISTANT

## 2023-09-07 RX ORDER — INDOMETHACIN 50 MG/1
50 CAPSULE ORAL 2 TIMES DAILY PRN
COMMUNITY
Start: 2023-04-04

## 2023-09-07 RX ORDER — FAMOTIDINE 40 MG/1
TABLET, FILM COATED ORAL
COMMUNITY
Start: 2023-08-09

## 2023-09-07 NOTE — PROGRESS NOTES
Name: Niki Tejeda  YOB: 1955  Gender: male  MRN: [id number]    CC: Back Pain (Follow up after injection with JPM 8/8/23)         HPI: This is a 80y.o. year old male who  has a past medical history of Bilateral knee pain, BPH (benign prostatic hypertrophy), Cellulitis, Elevated PSA, Gout, Hematuria, gross, Hypercholesteremia, Hypertension, Lung nodule, Obesity, Personal history of prostate cancer, Prostate cancer ([address]), and Snoring. Alysha Santosamadeo who was diagnosed with lumbar stenosis L3-S1 quite prominent at L3-4 and L4-5 in 2019. He was under the care of Dr. Hieu Aguilera. He has undergone lumbar L4-5 epidural steroid injections in the past.  His last epidural steroid injection at L4-5 was 2/6/23. He reports he did very well after the injection. Patient lasted approximately 4 months. He had return of back pain about 1 month ago. Pain can be severe it is worse in the morning can get up to 10 out of 10. He is hardly stand up straight and pursue activities of daily living because of the pain. He feels like he has weakness in his back and bilateral legs. Denies numbness or tingling in the legs. He saw chiropractor in 2022 and did not perceive significant relief with that. He has had physical therapy in February 2023. He continues home exercise program previously prescribed. He is having difficulty performing those exercises in the morning. We ordered a L4-5 epidural steroid injection as that had previously been very effective. He reports 75% relief of symptoms for only 1 week. He is here today to review new MRI scan of the lumbar spine. ROS/Meds/PSH/PMH/FH/SH: I personally reviewed the patient's collected intake data.   Below are the pertinents:    Allergies   Allergen Reactions    Hydrochlorothiazide Other (See Comments)     \"Gout\"         Current Outpatient Medications:     indomethacin (INDOCIN) 50 MG capsule, Take 1 capsule by mouth 2 times daily as needed, Disp: , Rfl:

## 2023-09-18 ENCOUNTER — OFFICE VISIT (OUTPATIENT)
Dept: ORTHOPEDIC SURGERY | Age: 68
End: 2023-09-18
Payer: COMMERCIAL

## 2023-09-18 DIAGNOSIS — M51.36 DDD (DEGENERATIVE DISC DISEASE), LUMBAR: ICD-10-CM

## 2023-09-18 DIAGNOSIS — M47.816 FACET ARTHROPATHY, LUMBAR: ICD-10-CM

## 2023-09-18 DIAGNOSIS — M48.062 LUMBAR STENOSIS WITH NEUROGENIC CLAUDICATION: Primary | ICD-10-CM

## 2023-09-18 PROCEDURE — 62323 NJX INTERLAMINAR LMBR/SAC: CPT | Performed by: PHYSICAL MEDICINE & REHABILITATION

## 2023-09-18 RX ORDER — BETAMETHASONE SODIUM PHOSPHATE AND BETAMETHASONE ACETATE 3; 3 MG/ML; MG/ML
12 INJECTION, SUSPENSION INTRA-ARTICULAR; INTRALESIONAL; INTRAMUSCULAR; SOFT TISSUE ONCE
Status: COMPLETED | OUTPATIENT
Start: 2023-09-18 | End: 2023-09-18

## 2023-09-18 RX ADMIN — BETAMETHASONE SODIUM PHOSPHATE AND BETAMETHASONE ACETATE 12 MG: 3; 3 INJECTION, SUSPENSION INTRA-ARTICULAR; INTRALESIONAL; INTRAMUSCULAR; SOFT TISSUE at 09:30

## 2023-09-18 NOTE — PROGRESS NOTES
Name: Marlin Plzaa  YOB: 1955  Gender: male  MRN: [id number]        Interlaminar KARL Procedure Note      Procedure: L5-S1 interlaminar epidural steroid injection    Precautions: Marlin Plaza denies prior sensitivity to steroid, local anesthetic, iodine, or shellfish. Consent:  Consent was obtained prior to the procedure. The procedure was discussed at length with Marlin Plaza. He was given the opportunity to ask questions regarding the procedure and its associated risks. In addition to the potential risks associated with the procedure itself, the patient was informed both verbally and in writing of potential side effects of the use glucocorticoids. The patient appeared to comprehend the informed consent and desired to have the procedure performed, and informed consent was signed. He was placed in a prone position on the fluoroscopy table and the skin was prepped and draped in a routine sterile fashion. The areas to be injected were each anesthetized with 1 ml of 1% Lidocaine. A 22 gauge 3.5 inch spinal needle was carefully advanced under fluoroscopic guidance to the L5-S1 interlaminar space. 0.5 ml of 70% of Omnipaque was injected to confirm proper needle placement and absence of subdural or vascular flow Once proper placement was confirmed, 2ml of sterile water and 12 mg of betamethasone were injected through the spinal needle. Fluoroscopic guidance was used intermittently over a 10-minute period to insure proper needle placement and his safety. A hard copy of the fluoroscopic image has been placed in his chart and is saved on the C-arm hard drive. He was monitored for 30 minutes after the procedure and discharged home in a stable fashion with a routine follow up.     Procedural Diagnosis:     ICD-10-CM    1. Lumbar stenosis with neurogenic claudication  M48.062 XR INJ SPINE THER SUBST LUM/SAC W IMG     betamethasone acetate-betamethasone sodium phosphate (CELESTONE)

## 2023-09-26 ENCOUNTER — TELEPHONE (OUTPATIENT)
Dept: ORTHOPEDIC SURGERY | Age: 68
End: 2023-09-26

## 2023-09-26 DIAGNOSIS — M48.061 FORAMINAL STENOSIS OF LUMBAR REGION: ICD-10-CM

## 2023-09-26 DIAGNOSIS — M51.36 DDD (DEGENERATIVE DISC DISEASE), LUMBAR: ICD-10-CM

## 2023-09-26 DIAGNOSIS — M47.816 FACET ARTHROPATHY, LUMBAR: ICD-10-CM

## 2023-09-26 DIAGNOSIS — M48.062 LUMBAR STENOSIS WITH NEUROGENIC CLAUDICATION: Primary | ICD-10-CM

## 2023-09-26 DIAGNOSIS — M54.50 LOW BACK PAIN, UNSPECIFIED BACK PAIN LATERALITY, UNSPECIFIED CHRONICITY, UNSPECIFIED WHETHER SCIATICA PRESENT: ICD-10-CM

## 2023-09-26 RX ORDER — TRAMADOL HYDROCHLORIDE 50 MG/1
50 TABLET ORAL EVERY 6 HOURS PRN
Qty: 28 TABLET | Refills: 0 | Status: SHIPPED | OUTPATIENT
Start: 2023-09-26 | End: 2023-10-03

## 2023-09-26 NOTE — TELEPHONE ENCOUNTER
He is getting ready to go on vacation and Emilie told him she would refill his meds before he went. He didn't have the bottle and does not know the name of it but says its the only one she has given him this year. He does use [facility] in [place] on [address] [facility].

## 2023-10-31 ENCOUNTER — OFFICE VISIT (OUTPATIENT)
Dept: ORTHOPEDIC SURGERY | Age: 68
End: 2023-10-31
Payer: COMMERCIAL

## 2023-10-31 DIAGNOSIS — M48.061 FORAMINAL STENOSIS OF LUMBAR REGION: ICD-10-CM

## 2023-10-31 DIAGNOSIS — M43.16 SPONDYLOLISTHESIS OF LUMBAR REGION: ICD-10-CM

## 2023-10-31 DIAGNOSIS — M51.36 DDD (DEGENERATIVE DISC DISEASE), LUMBAR: ICD-10-CM

## 2023-10-31 DIAGNOSIS — M48.062 LUMBAR STENOSIS WITH NEUROGENIC CLAUDICATION: Primary | ICD-10-CM

## 2023-10-31 DIAGNOSIS — M47.816 FACET ARTHROPATHY, LUMBAR: ICD-10-CM

## 2023-10-31 PROCEDURE — 1123F ACP DISCUSS/DSCN MKR DOCD: CPT | Performed by: PHYSICIAN ASSISTANT

## 2023-10-31 PROCEDURE — 99213 OFFICE O/P EST LOW 20 MIN: CPT | Performed by: PHYSICIAN ASSISTANT

## 2023-10-31 NOTE — PROGRESS NOTES
75% relief of his symptoms. He is still doing well with the injection. We discussed I do not know how long this will remain effective and we did discuss further surgical intervention. He continues with his home exercises daily. I did review surgical intervention with Dr. Jerman Aiken. He does have spondylolisthesis at L3-4. Spinal stenosis L3-S1. Facet arthropathy multiple levels. This would likely require laminectomy fusion. ROS/Meds/PSH/PMH/FH/SH: I personally reviewed the patient's collected intake data.   Below are the pertinents:    Allergies   Allergen Reactions    Hydrochlorothiazide Other (See Comments)     \"Gout\"         Current Outpatient Medications:     famotidine (PEPCID) 40 MG tablet, , Disp: , Rfl:     indomethacin (INDOCIN) 50 MG capsule, Take 1 capsule by mouth 2 times daily as needed, Disp: , Rfl:     gabapentin (NEURONTIN) 300 MG capsule, TAKE 1 CAPSULE BY MOUTH THREE TIMES DAILY, Disp: 90 capsule, Rfl: 3    albuterol sulfate HFA (PROVENTIL;VENTOLIN;PROAIR) 108 (90 Base) MCG/ACT inhaler, Inhale 2 puffs into the lungs, Disp: , Rfl:     Calcium Carbonate-Vitamin D (OYSTER SHELL CALCIUM/D) 500-5 MG-MCG TABS, Take 1 tablet by mouth, Disp: , Rfl:     amLODIPine-benazepril (LOTREL) 10-20 MG per capsule, Take 1 capsule by mouth daily, Disp: , Rfl:     vitamin D (CHOLECALCIFEROL) 25 MCG (1000 UT) TABS tablet, Take by mouth daily, Disp: , Rfl:     clobetasol (TEMOVATE) 0.05 % cream, Apply topically 2 times daily, Disp: , Rfl:     fluocinonide (LIDEX) 0.05 % ointment, APPLY TOPICALLY TO THE AFFECTED AREA TWICE DAILY FOR 7 TO 10 DAYS AS NEEDED, Disp: , Rfl:     hydrOXYzine (ATARAX) 25 MG tablet, TAKE 1 TABLET BY MOUTH EVERY NIGHT AT BEDTIME, Disp: , Rfl:     senna-docusate (PERICOLACE) 8.6-50 MG per tablet, Take 2 tablets by mouth 2 times daily, Disp: , Rfl:     simvastatin (ZOCOR) 20 MG tablet, Take 1 tablet by mouth daily, Disp: , Rfl:     tadalafil (CIALIS) 20 MG tablet, Take 1 tablet by mouth as

## 2024-02-23 ENCOUNTER — OFFICE VISIT (OUTPATIENT)
Dept: ORTHOPEDIC SURGERY | Age: 69
End: 2024-02-23

## 2024-02-23 VITALS — HEIGHT: 72 IN | BODY MASS INDEX: 33.72 KG/M2 | WEIGHT: 249 LBS

## 2024-02-23 DIAGNOSIS — M19.072 PRIMARY OSTEOARTHRITIS OF LEFT ANKLE: ICD-10-CM

## 2024-02-23 DIAGNOSIS — M20.22 HALLUX RIGIDUS OF LEFT FOOT: ICD-10-CM

## 2024-02-23 DIAGNOSIS — M19.072 PRIMARY OSTEOARTHRITIS OF LEFT FOOT: ICD-10-CM

## 2024-02-23 DIAGNOSIS — M25.572 LEFT ANKLE PAIN, UNSPECIFIED CHRONICITY: Primary | ICD-10-CM

## 2024-02-23 RX ORDER — LIDOCAINE 5% 5 G/100G
CREAM TOPICAL
Qty: 45 G | Refills: 2 | Status: SHIPPED | OUTPATIENT
Start: 2024-02-23

## 2024-02-23 NOTE — PROGRESS NOTES
The patient was prescribed a Wraptor brace for the patient's leftfoot. The patient wears a size 12 shoe and I fitted the patient with a XXL brace. I explained how to fit the brace properly by pulling the lace tabs across top of foot first then under arch and lastly pulling the strap up firmly and attaching to the lateral Velcro strip. Thus forming a figure 8 across the ankle joint. Once the figure 8 is completed they are to secure the top (short circumferential) straps to help avoid the straps from loosening with normal wear.  The patient was able to demonstrate proper fitting in office to ensure compliance with device and acknowledged satisfaction with current fit.     The patient was also prescribed and fitted with an ankle sleeve for the left foot, size large.     Patient read and signed documenting they understand and agree to [facility]'s current DME return policy.

## 2024-02-23 NOTE — PROGRESS NOTES
Name: Tripp Rowe  YOB: 1955  Gender: male  MRN: [id number]    CC: Left ankle/foot pain    HPI:   02/23/2024: Initial visit: 2 concerns: Left intermittent outer ankle pain: Left great toe nodule    ROS/Meds/PSH/PMH/FH/SH: reviewed today    Tobacco:  reports that he quit smoking about 29 years ago. His smoking use included cigarettes. He has never used smokeless tobacco.     Physical Examination:  Patient appears to be alert and oriented with acceptable appearance.  No obvious distress or SOB  CV: appears to have acceptable vascular color and capillary refill  Neuro: appears to have mostly intact light touch sensation   Skin: Sinus Tarsi thickening; dorsal first MTP thickening  MS: Standing: Plantigrade: Gait protected  Left = sinus Tarsi/subtalar joint > anterior or medial ankle pain  Left = first MTP dorsal exostosis and limited MTP motion    XR: Left: Standing AP lateral mortise ankle plus AP oblique foot taken today with medial to anterior ankle exostosis impingement; navicular cuneiform arthritis; tarsometatarsal arthritis; retained first metatarsal hardware; arthritic first MTP joint  XR Impression:  As above      Reviewed Test/Records/Documents:   11/26/2012: Bilateral bunionectomies, cheilectomy's with gouty tophi debridement  07/10/2019: Right ankle/tibial/fibular exostectomy's, loose body removal, lateral talar OCD debridement  10/31/2023: TOMY Moreland: ... neurogenic claudication which is due to progression of lumbar stenosis.  He did not get lasting relief from L4-5 epidural steroid injection...progression of disc degeneration at L5-S1 this may be more of the source of his pain and I would recommend that we try another injection but do it at L5-S1 to see if we get bit more relief.. Surgery indicated would be L3-S1 laminectomy.      Injection: Discussed as an option    Assessment:    Left anterior medial ankle impingement exostoses  Left midfoot arthritis  Left first MTP arthritis;

## 2024-02-26 ENCOUNTER — TELEPHONE (OUTPATIENT)
Dept: ORTHOPEDIC SURGERY | Age: 69
End: 2024-02-26

## 2024-02-26 NOTE — TELEPHONE ENCOUNTER
Called pt to let him know that the RX he dropped off at his regular pharmacy will need to be picked up and taken to a compounding pharmacy. Pt expressed understanding.

## 2024-02-26 NOTE — TELEPHONE ENCOUNTER
They got a RX for lidocaine and gabapentin. She needs to know if this is for a compound cream. Please give her a call.

## 2024-02-26 NOTE — TELEPHONE ENCOUNTER
Returned pharmacy call to let them know that the RX is a compound. They stated they will give the RX back to the pt.

## 2024-04-19 ENCOUNTER — HOSPITAL ENCOUNTER (OUTPATIENT)
Dept: CT IMAGING | Age: 69
End: 2024-04-19
Attending: SURGERY
Payer: MEDICARE

## 2024-04-19 DIAGNOSIS — C34.91 BRONCHOGENIC CANCER OF RIGHT LUNG (HCC): ICD-10-CM

## 2024-04-19 LAB — CREAT BLD-MCNC: 1.09 MG/DL (ref 0.8–1.5)

## 2024-04-19 PROCEDURE — 6360000004 HC RX CONTRAST MEDICATION: Performed by: SURGERY

## 2024-04-19 PROCEDURE — 71260 CT THORAX DX C+: CPT

## 2024-04-19 PROCEDURE — 71260 CT THORAX DX C+: CPT | Performed by: RADIOLOGY

## 2024-04-19 PROCEDURE — 82565 ASSAY OF CREATININE: CPT

## 2024-04-19 RX ADMIN — IOPAMIDOL 80 ML: 755 INJECTION, SOLUTION INTRAVENOUS at 07:54

## 2024-07-16 ENCOUNTER — OFFICE VISIT (OUTPATIENT)
Dept: ORTHOPEDIC SURGERY | Age: 69
End: 2024-07-16
Payer: MEDICARE

## 2024-07-16 VITALS — HEIGHT: 72 IN | WEIGHT: 242 LBS | BODY MASS INDEX: 32.78 KG/M2

## 2024-07-16 DIAGNOSIS — M19.011 OSTEOARTHRITIS OF RIGHT GLENOHUMERAL JOINT: ICD-10-CM

## 2024-07-16 DIAGNOSIS — S43.401A SPRAIN OF RIGHT SHOULDER, UNSPECIFIED SHOULDER SPRAIN TYPE, INITIAL ENCOUNTER: Primary | ICD-10-CM

## 2024-07-16 DIAGNOSIS — M19.011 DEGENERATIVE JOINT DISEASE OF RIGHT ACROMIOCLAVICULAR JOINT: ICD-10-CM

## 2024-07-16 PROCEDURE — G8427 DOCREV CUR MEDS BY ELIG CLIN: HCPCS | Performed by: ORTHOPAEDIC SURGERY

## 2024-07-16 PROCEDURE — 1123F ACP DISCUSS/DSCN MKR DOCD: CPT | Performed by: ORTHOPAEDIC SURGERY

## 2024-07-16 PROCEDURE — 1036F TOBACCO NON-USER: CPT | Performed by: ORTHOPAEDIC SURGERY

## 2024-07-16 PROCEDURE — 3017F COLORECTAL CA SCREEN DOC REV: CPT | Performed by: ORTHOPAEDIC SURGERY

## 2024-07-16 PROCEDURE — G8417 CALC BMI ABV UP PARAM F/U: HCPCS | Performed by: ORTHOPAEDIC SURGERY

## 2024-07-16 PROCEDURE — 99204 OFFICE O/P NEW MOD 45 MIN: CPT | Performed by: ORTHOPAEDIC SURGERY

## 2024-07-16 NOTE — PROGRESS NOTES
Name: Tripp Rowe  YOB: 1955  Gender: male  MRN: [id number]      What: Right shoulder pain  How: A fall working on a doorknob  When: 2 to 3 months ago        HPI: Tripp Rowe is a 68 y.o. right-hand-dominant male seen for right shoulder problems.  He has a history of hypertension, gout, prostate cancer, hypercholesterolemia.  No previous shoulder problems.  He was fixing a doorknob 2 to 3 months ago when he fell injuring his right shoulder.  Now he has pain, pain at night and inability to elevate the right arm.  He denies any numbness or tingling.  He denies any left shoulder problems.      ROS/Meds/PSH/PMH/FH/SH: A ten system review of systems was performed and is negative other than what is in the HPI.   Tobacco:  reports that he quit smoking about 29 years ago. His smoking use included cigarettes. He has never used smokeless tobacco.  Ht 1.829 m (6')   Wt 109.8 kg (242 lb)   BMI 32.82 kg/m²      Physical Examination:  He is awake alert pleasant gentleman ambulating without difficulty  He has restricted range of cervical spine motion without radicular findings    The left shoulder has 0 to 180 degrees of active and 0 to 180 degrees passive forward elevation.   Internal rotation is to T6.  External rotation is to 60 degrees at the side.   In the 90 degree abducted position 90 degrees of external and 90 degrees internal rotation  The AC joint is non-tender  SC joint is non-tender.   Greater tuberosity is non-tender.  negative biceps  Negative O'Briens sign  negative lift-off sign  Negative belly press sign  Negative bear huggers sign  negative drop sign  negative hornblower's sign  No posterior glenohumeral joint line tenderness.   No evident excessive external rotation  Rotator cuff strength is 5/5.  negative external rotation stress test.   Negative empty can sign  There is no evident anterior or posterior apprehension with a negative sulcus sign.   No instability  negative external and

## 2024-08-01 NOTE — PROGRESS NOTES
Medication: Metoprolol  Last office visit date: 3/29/2024  Medication Refill Protocol Failed.  Protocol approved due to: data identified in chart review.     Name: Chantal Johnson  YOB: 1955  Gender: male  MRN: [id number]    CC: Back Pain (Right low back pain into right leg)       HPI: This is a 78y.o. year old male who  has a past medical history of Bilateral knee pain, BPH (benign prostatic hypertrophy), Cellulitis, Elevated PSA, Gout, Hematuria, gross, Hypercholesteremia, Hypertension, Lung nodule, Obesity, Personal history of prostate cancer, Prostate cancer ([address] and Snoring. Fay Siddiqi who was diagnosed with lumbar stenosis L3-S1 quite prominent at L3-4 and L4-5. He was under the care of Dr. Jessica Mann. He has undergone lumbar L4-5 epidural steroid injections in the past.  His last epidural steroid injection at L4-5 was June 16, 2020. He reports he did very well after that series of injections. Over the past 2 months he has had exacerbation of his lower back pain mostly in the center of the back and radiates into the right hip and right lateral leg with some numbness and tingling associated lateral thigh. It wakes him up and it is much worse in the morning sometimes it can get up to 10 out of 10 often it stays around 5 out of 10. He saw chiropractor in 2022 and did not perceive significant relief with that. He is now interested in lumbar injections again. Symptoms really have not changed except for the current severity. Denies bladder or bowel incontinence. AMB PAIN ASSESSMENT 1/31/2023   Location of Pain Back   Location Modifiers Right   Severity of Pain 6   Quality of Pain Aching; Other (Comment)   Duration of Pain Persistent   Frequency of Pain Constant   Date Pain First Started 6/11/2003   Aggravating Factors Standing;Walking   Limiting Behavior No   Result of Injury No   Work-Related Injury No   Are there other pain locations you wish to document? No            ROS/Meds/PSH/PMH/FH/SH: I personally reviewed the patient's collected intake data.   Below are the pertinents:    No Known Allergies      Current Outpatient Medications: albuterol sulfate HFA (PROVENTIL;VENTOLIN;PROAIR) 108 (90 Base) MCG/ACT inhaler, Inhale 2 puffs into the lungs, Disp: , Rfl:     Calcium Carbonate-Vitamin D (OYSTER SHELL CALCIUM/D) 500-5 MG-MCG TABS, Take 1 tablet by mouth, Disp: , Rfl:     amLODIPine-benazepril (LOTREL) 10-20 MG per capsule, Take 1 capsule by mouth daily, Disp: , Rfl:     vitamin D (CHOLECALCIFEROL) 25 MCG (1000 UT) TABS tablet, Take by mouth daily, Disp: , Rfl:     simvastatin (ZOCOR) 20 MG tablet, Take 20 mg by mouth daily, Disp: , Rfl:     clobetasol (TEMOVATE) 0.05 % cream, Apply topically 2 times daily (Patient not taking: Reported on 1/31/2023), Disp: , Rfl:     fluocinonide (LIDEX) 0.05 % ointment, APPLY TOPICALLY TO THE AFFECTED AREA TWICE DAILY FOR 7 TO 10 DAYS AS NEEDED (Patient not taking: Reported on 1/31/2023), Disp: , Rfl:     hydrOXYzine (ATARAX) 25 MG tablet, TAKE 1 TABLET BY MOUTH EVERY NIGHT AT BEDTIME (Patient not taking: Reported on 1/31/2023), Disp: , Rfl:     senna-docusate (PERICOLACE) 8.6-50 MG per tablet, Take 2 tablets by mouth 2 times daily (Patient not taking: Reported on 1/31/2023), Disp: , Rfl:     tadalafil (CIALIS) 20 MG tablet, Take 20 mg by mouth as needed (Patient not taking: Reported on 1/31/2023), Disp: , Rfl:     Past Surgical History:   Procedure Laterality Date    BIOPSY PROSTATE      BUNIONECTOMY Bilateral 2013    COLONOSCOPY      KNEE ARTHROSCOPY Bilateral 2005    bilateral knees    ORTHOPEDIC SURGERY Right     ankle surgery    ORTHOPEDIC SURGERY Left     left achilles tendon repair       Patient Active Problem List   Diagnosis    Prostate cancer ([facility] [address]    Elevated PSA    Prostate nodule    Bronchogenic cancer of right lung ([facility] [address]    Erectile dysfunction    Lung mass         Tobacco:  reports that he quit smoking about 28 years ago. His smoking use included cigarettes. He smoked an average of 1 pack per day.  He has never used smokeless tobacco.  Alcohol:   Social History     Substance and Sexual Activity Alcohol Use No        Physical Exam:   BMI: Body mass index is 33.5 kg/m². GENERAL:  Adult in no acute distress, well developed, well nourished Patient is appropriately conversant  MSK:  Examination of the lumbar spine reveals no sagittal or coronal imbalance   There is mild tenderness to palpation along the spinous processes and paraspinal musculature. The patient ambulates with a normal gait. ROM of bilateral hip(s) reveals no irritability. NEURO:  Cranial nerves grossly intact. No motor deficits. Straight leg testing is positive right  Sensory testing reveals intact sensation to light touch and in the distribution of the L3-S1 dermatomes bilaterally  Ankle jerk is negative for clonus    Reflexes   Right Left   Quadriceps (L4) 2 2   Achilles (S1) 2 2     Strength testing in the lower extremity reveals the following based on the 5 point grading scale:     HF (L2) H Ab (L5) KE (L3/4) ADF (L4) EHL (L5) A Ev (S1) APF (S1)   Right 5 5 5 5 5 5 5   Left 5 5 5 5 5 5 5     PSYCH:  Alert and oriented X 3. Appropriate affect. Intact judgment and insight. Radiographic Studies:     AP, lateral and spot views of the lumbar spine:  1/31/23      AP of the lumbar spine shows normal alignment. Multilevel degenerative changes and facet arthropathy. Hips show no significant DJD. Sagittal view the lumbar spine reveals hypolordosis. Multilevel degenerative disc changes with loss of disc base height and facet arthropathy. No spondylolisthesis noted. X-ray impression: Advanced degenerative changes lumbar spine there has been progression of degenerative disc compared to prior x-rays 2018      Assessment/Plan:         Diagnosis Orders   1. Lumbar stenosis with neurogenic claudication        2. Low back pain, unspecified back pain laterality, unspecified chronicity, unspecified whether sciatica present  XR LUMBAR SPINE (2-3 VIEWS)      3. DDD (degenerative disc disease), lumbar        4.  Foraminal stenosis of lumbar region              This patient's clinical history and physical exam is consistent with a right  L-5 lumbar radiculopathy. Prior MRI scan of the lumbar spine revealed spinal stenosis L3-4 L4-5 and even some at L5-S1 with more prominent foraminal narrowing. We discussed the natural history of lumbar radiculopathy in that many of these patients have near complete resolution of their symptoms within eight to twelve weeks with conservative care. We discussed that conservative treatments typically start with activity modification, and medication followed by physical therapy as symptoms allow. Oral and/or epidural steroids are other options. I also discussed potential surgical options if the symptoms fail to improve or there is a progressive neurologic deficit and conservative management has been exhausted. We discussed that surgery is not typically a reliable treatment for isolated back pain, but is usually very reliable in relieving buttock and leg symptoms. Previously Dr. Ana Boswell had discussed L3-S1 laminectomy. The patient has done well with lumbar injections and would like to pursue those again.        - Physical Therapy was prescribed and will include stretching, strengthening and modalities to promote blood flow, flexibility and core strengthening.  - Injection: The patient will be referred for a lumbar steroid injection to help reduce the symptoms. The patient understands the risks including hyperglycemia, immunosuppression, meningitis, cerebrospinal fluid leak, epidural hematoma, and reaction to medication. The patient may benefit from additional injections depending on the response. The injection should be a L4-5 KARL    4 This is a chronic illness/condition with exacerbation and progression    No orders of the defined types were placed in this encounter.        Orders Placed This Encounter   Procedures    XR LUMBAR SPINE (2-3 VIEWS)            Return for lumbar injection with JPM, lumbar injection recheck [facility] GIOVANNY, 4 weeks after. TOMY Fish-C  01/31/23      Elements of this note were created using speech recognition software. As such, errors of speech recognition may be present.

## 2024-08-06 ENCOUNTER — OFFICE VISIT (OUTPATIENT)
Dept: ORTHOPEDIC SURGERY | Age: 69
End: 2024-08-06
Payer: MEDICARE

## 2024-08-06 DIAGNOSIS — S46.011D TRAUMATIC COMPLETE TEAR OF RIGHT ROTATOR CUFF, SUBSEQUENT ENCOUNTER: Primary | ICD-10-CM

## 2024-08-06 DIAGNOSIS — S46.111A STRAIN OF LONG HEAD OF RIGHT BICEPS: ICD-10-CM

## 2024-08-06 DIAGNOSIS — S46.111A STRAIN OF MUSCLE, FASCIA AND TENDON OF LONG HEAD OF BICEPS, RIGHT ARM, INITIAL ENCOUNTER: ICD-10-CM

## 2024-08-06 DIAGNOSIS — M19.011 DEGENERATIVE JOINT DISEASE OF RIGHT ACROMIOCLAVICULAR JOINT: ICD-10-CM

## 2024-08-06 DIAGNOSIS — M19.011 OSTEOARTHRITIS OF RIGHT GLENOHUMERAL JOINT: ICD-10-CM

## 2024-08-06 PROBLEM — S46.011A TRAUMATIC COMPLETE TEAR OF RIGHT ROTATOR CUFF: Status: ACTIVE | Noted: 2024-08-06

## 2024-08-06 PROCEDURE — G8417 CALC BMI ABV UP PARAM F/U: HCPCS | Performed by: ORTHOPAEDIC SURGERY

## 2024-08-06 PROCEDURE — 3017F COLORECTAL CA SCREEN DOC REV: CPT | Performed by: ORTHOPAEDIC SURGERY

## 2024-08-06 PROCEDURE — 1123F ACP DISCUSS/DSCN MKR DOCD: CPT | Performed by: ORTHOPAEDIC SURGERY

## 2024-08-06 PROCEDURE — 99214 OFFICE O/P EST MOD 30 MIN: CPT | Performed by: ORTHOPAEDIC SURGERY

## 2024-08-06 PROCEDURE — 1036F TOBACCO NON-USER: CPT | Performed by: ORTHOPAEDIC SURGERY

## 2024-08-06 PROCEDURE — G8427 DOCREV CUR MEDS BY ELIG CLIN: HCPCS | Performed by: ORTHOPAEDIC SURGERY

## 2024-08-06 NOTE — PROGRESS NOTES
Name: Tripp Rowe  YOB: 1955  Gender: male  MRN: [id number]      What: Right shoulder pain  How: A fall working on a doorknob  When: 2 to 3 months ago        HPI: Tripp Rowe is a 69 y.o. right-hand-dominant male seen for right shoulder problems.  He has a history of hypertension, gout, prostate cancer, hypercholesterolemia.  No previous shoulder problems.  He was fixing a doorknob 2 to 3 months ago when he fell injuring his right shoulder.  Now he has pain, pain at night and inability to elevate the right arm.  He denies any numbness or tingling.  He denies any left shoulder problems.      ROS/Meds/PSH/PMH/FH/SH: A ten system review of systems was performed and is negative other than what is in the HPI.   Tobacco:  reports that he quit smoking about 29 years ago. His smoking use included cigarettes. He has never used smokeless tobacco.  There were no vitals taken for this visit.     Physical Examination:  He is awake alert pleasant gentleman ambulating without difficulty  He has restricted range of cervical spine motion without radicular findings    The left shoulder has 0 to 180 degrees of active and 0 to 180 degrees passive forward elevation.   Internal rotation is to T6.  External rotation is to 60 degrees at the side.   In the 90 degree abducted position 90 degrees of external and 90 degrees internal rotation  The AC joint is non-tender  SC joint is non-tender.   Greater tuberosity is non-tender.  negative biceps  Negative O'Briens sign  negative lift-off sign  Negative belly press sign  Negative bear huggers sign  negative drop sign  negative hornblower's sign  No posterior glenohumeral joint line tenderness.   No evident excessive external rotation  Rotator cuff strength is 5/5.  negative external rotation stress test.   Negative empty can sign  There is no evident anterior or posterior apprehension with a negative sulcus sign.   No instability  negative external and internal Rotation lag

## 2024-08-28 RX ORDER — ORAL SEMAGLUTIDE 3 MG/1
3 TABLET ORAL
COMMUNITY
Start: 2024-02-26

## 2024-08-28 RX ORDER — FLUTICASONE PROPIONATE AND SALMETEROL 500; 50 UG/1; UG/1
1 POWDER RESPIRATORY (INHALATION) 2 TIMES DAILY
COMMUNITY
Start: 2024-01-22 | End: 2025-01-22

## 2024-08-28 NOTE — PERIOP NOTE
Patient verified name and .  Order for consent not found in EHR.   Type 1B surgery, PAT phone assessment complete.  Orders not received.  Labs per surgeon: none  Labs per anesthesia protocol: none    Patient answered medical/surgical history questions at their best of ability. All prior to admission medications documented in EPIC.    Patient instructed to continue taking all prescription medications up to the day of surgery but to take only the following medications the day of surgery according to anesthesia guidelines with a small sip of water: wixela, bring albuterol inhaler.  Also, patient is requested to take 2 Tylenol in the morning and then again before bed on the day before surgery. Regular or extra strength may be used.       Patient informed that all vitamins and supplements should be held 7 days prior to surgery and NSAIDS 5 days prior to surgery. Prescription meds to hold: Rybelsus x 7 days.     Patient instructed on the following:    > Arrive at A Entrance, time of arrival to be called the day before by 1700  > Clear liquid diet only on the day before surgery. NPO after midnight, unless otherwise indicated, including gum, mints, and ice chips  > Responsible adult must drive patient to the hospital, stay during surgery, and patient will need supervision 24 hours after anesthesia  > Use non moisturizing soap in shower the night before surgery and on the morning of surgery  > All piercings must be removed prior to arrival.    > Leave all valuables (money and jewelry) at home but bring insurance card and ID on DOS.   > Do not wear make-up, nail polish, lotions, cologne, perfumes, powders, or oil on skin. Artificial nails are not permitted.    The GLP-1 agonists are associated with adverse gastrointestinal effects such as nausea, vomiting, and delayed gastric emptying. Delayed gastric emptying from GLP-1 agonists can increase the risk of regurgitation and pulmonary aspiration of gastric contents during

## 2024-08-30 ENCOUNTER — PREP FOR PROCEDURE (OUTPATIENT)
Dept: ORTHOPEDIC SURGERY | Age: 69
End: 2024-08-30

## 2024-08-30 DIAGNOSIS — S46.011D TRAUMATIC COMPLETE TEAR OF RIGHT ROTATOR CUFF, SUBSEQUENT ENCOUNTER: Primary | ICD-10-CM

## 2024-08-30 RX ORDER — SODIUM CHLORIDE 0.9 % (FLUSH) 0.9 %
5-40 SYRINGE (ML) INJECTION EVERY 12 HOURS SCHEDULED
Status: CANCELLED | OUTPATIENT
Start: 2024-08-30

## 2024-08-30 RX ORDER — SODIUM CHLORIDE 0.9 % (FLUSH) 0.9 %
5-40 SYRINGE (ML) INJECTION PRN
Status: CANCELLED | OUTPATIENT
Start: 2024-08-30

## 2024-08-30 RX ORDER — SODIUM CHLORIDE 9 MG/ML
INJECTION, SOLUTION INTRAVENOUS PRN
Status: CANCELLED | OUTPATIENT
Start: 2024-08-30

## 2024-08-31 NOTE — H&P
Subjective:     Patient is a 69 y.o. RHD MALE WITH RIGHT SHOULDER PAIN    SEE OFFICE NOTE.    Patient Active Problem List    Diagnosis Date Noted    Traumatic complete tear of right rotator cuff 08/06/2024    Strain of muscle, fascia and tendon of long head of biceps, right arm, initial encounter 08/06/2024    Osteoarthritis of right glenohumeral joint 08/06/2024    Degenerative joint disease of right acromioclavicular joint 08/06/2024    Bronchogenic cancer of right lung (HCC) 04/28/2021    Lung mass 04/28/2021    Erectile dysfunction 12/19/2017    Prostate cancer (HCC) 03/15/2016    Elevated PSA 12/02/2015    Prostate nodule 12/02/2015     Past Medical History:   Diagnosis Date    Bilateral knee pain     BPH (benign prostatic hypertrophy)     Cellulitis 04/10/2021    cellulitis left leg currently on antibiotic    Diabetes mellitus, type 2 (HCC)     oral meds, does not check BS, last A1C 6.0 in 4/2024    Elevated PSA     Gout          Hematuria, gross     Hypercholesteremia     medication    Hypertension     controlled with medication    Lung cancer (HCC)     RLL- surgery    Lung nodule 2021    Obesity     bmi=33    JEAN (obstructive sleep apnea)     C-pap nightly    Personal history of prostate cancer     Prostate cancer (HCC) dx--10/2015    radiation only    Snoring     SOB (shortness of breath) on exertion     Inhaler as needed- last use 8/21/24      Past Surgical History:   Procedure Laterality Date    BIOPSY PROSTATE      BUNIONECTOMY Bilateral 2013    COLONOSCOPY      ESOPHAGOGASTRODUODENOSCOPY      KNEE ARTHROSCOPY Bilateral 2005    bilateral knees    ORTHOPEDIC SURGERY Right     ankle surgery    ORTHOPEDIC SURGERY Left     left achilles tendon repair    THORACOSCOPY  2021    RIGHT THORACOSCOPY VATS, WEDGE RESECTION WITH FROZEN SECTION, LOWER LOBECTOMY, MEDIASTINAL LYMPHADENECTOMY, CRYOABLATION OF INTERCOSTAL NERVES      Prior to Admission medications    Medication Sig Start Date End Date Taking?

## 2024-09-05 ENCOUNTER — ANESTHESIA EVENT (OUTPATIENT)
Dept: SURGERY | Age: 69
End: 2024-09-05
Payer: MEDICARE

## 2024-09-06 ENCOUNTER — HOSPITAL ENCOUNTER (OUTPATIENT)
Age: 69
Setting detail: OUTPATIENT SURGERY
Discharge: HOME | End: 2024-09-06
Attending: ORTHOPAEDIC SURGERY | Admitting: ORTHOPAEDIC SURGERY
Payer: MEDICARE

## 2024-09-06 ENCOUNTER — APPOINTMENT (OUTPATIENT)
Dept: GENERAL RADIOLOGY | Age: 69
End: 2024-09-06
Attending: ORTHOPAEDIC SURGERY
Payer: MEDICARE

## 2024-09-06 ENCOUNTER — ANESTHESIA (OUTPATIENT)
Dept: SURGERY | Age: 69
End: 2024-09-06
Payer: MEDICARE

## 2024-09-06 VITALS
RESPIRATION RATE: 15 BRPM | WEIGHT: 242 LBS | HEIGHT: 72 IN | OXYGEN SATURATION: 92 % | TEMPERATURE: 98 F | DIASTOLIC BLOOD PRESSURE: 73 MMHG | SYSTOLIC BLOOD PRESSURE: 129 MMHG | BODY MASS INDEX: 32.78 KG/M2 | HEART RATE: 60 BPM

## 2024-09-06 DIAGNOSIS — S46.011D TRAUMATIC COMPLETE TEAR OF RIGHT ROTATOR CUFF, SUBSEQUENT ENCOUNTER: ICD-10-CM

## 2024-09-06 DIAGNOSIS — M19.011 DEGENERATIVE JOINT DISEASE OF RIGHT ACROMIOCLAVICULAR JOINT: Primary | ICD-10-CM

## 2024-09-06 PROBLEM — D17.21 LIPOMA OF RIGHT SHOULDER: Status: ACTIVE | Noted: 2024-09-06

## 2024-09-06 PROBLEM — S43.431A SUPERIOR GLENOID LABRUM LESION OF RIGHT SHOULDER: Status: ACTIVE | Noted: 2024-09-06

## 2024-09-06 LAB
EST. AVERAGE GLUCOSE BLD GHB EST-MCNC: 136 MG/DL
GLUCOSE BLD STRIP.AUTO-MCNC: 119 MG/DL (ref 65–100)
HBA1C MFR BLD: 6.4 % (ref 0–5.6)
SERVICE CMNT-IMP: ABNORMAL

## 2024-09-06 PROCEDURE — 29824 SHO ARTHRS SRG DSTL CLAVICLC: CPT | Performed by: ORTHOPAEDIC SURGERY

## 2024-09-06 PROCEDURE — 23430 REPAIR BICEPS TENDON: CPT | Performed by: ORTHOPAEDIC SURGERY

## 2024-09-06 PROCEDURE — 83036 HEMOGLOBIN GLYCOSYLATED A1C: CPT

## 2024-09-06 PROCEDURE — 6360000002 HC RX W HCPCS: Performed by: ANESTHESIOLOGY

## 2024-09-06 PROCEDURE — 2580000003 HC RX 258: Performed by: ANESTHESIOLOGY

## 2024-09-06 PROCEDURE — 6370000000 HC RX 637 (ALT 250 FOR IP): Performed by: ANESTHESIOLOGY

## 2024-09-06 PROCEDURE — 3600000014 HC SURGERY LEVEL 4 ADDTL 15MIN: Performed by: ORTHOPAEDIC SURGERY

## 2024-09-06 PROCEDURE — 6360000002 HC RX W HCPCS: Performed by: ORTHOPAEDIC SURGERY

## 2024-09-06 PROCEDURE — 2500000003 HC RX 250 WO HCPCS: Performed by: STUDENT IN AN ORGANIZED HEALTH CARE EDUCATION/TRAINING PROGRAM

## 2024-09-06 PROCEDURE — 64415 NJX AA&/STRD BRCH PLXS IMG: CPT | Performed by: ANESTHESIOLOGY

## 2024-09-06 PROCEDURE — 6360000002 HC RX W HCPCS: Performed by: STUDENT IN AN ORGANIZED HEALTH CARE EDUCATION/TRAINING PROGRAM

## 2024-09-06 PROCEDURE — 3600000004 HC SURGERY LEVEL 4 BASE: Performed by: ORTHOPAEDIC SURGERY

## 2024-09-06 PROCEDURE — 82962 GLUCOSE BLOOD TEST: CPT

## 2024-09-06 PROCEDURE — 7100000001 HC PACU RECOVERY - ADDTL 15 MIN: Performed by: ORTHOPAEDIC SURGERY

## 2024-09-06 PROCEDURE — 23412 REPAIR ROTATOR CUFF CHRONIC: CPT | Performed by: ORTHOPAEDIC SURGERY

## 2024-09-06 PROCEDURE — 88304 TISSUE EXAM BY PATHOLOGIST: CPT

## 2024-09-06 PROCEDURE — C1713 ANCHOR/SCREW BN/BN,TIS/BN: HCPCS | Performed by: ORTHOPAEDIC SURGERY

## 2024-09-06 PROCEDURE — 2500000003 HC RX 250 WO HCPCS: Performed by: ORTHOPAEDIC SURGERY

## 2024-09-06 PROCEDURE — 7100000011 HC PHASE II RECOVERY - ADDTL 15 MIN: Performed by: ORTHOPAEDIC SURGERY

## 2024-09-06 PROCEDURE — 3700000001 HC ADD 15 MINUTES (ANESTHESIA): Performed by: ORTHOPAEDIC SURGERY

## 2024-09-06 PROCEDURE — 23073 EXC SHOULDER TUM DEEP 5 CM/>: CPT | Performed by: ORTHOPAEDIC SURGERY

## 2024-09-06 PROCEDURE — 2709999900 HC NON-CHARGEABLE SUPPLY: Performed by: ORTHOPAEDIC SURGERY

## 2024-09-06 PROCEDURE — 3700000000 HC ANESTHESIA ATTENDED CARE: Performed by: ORTHOPAEDIC SURGERY

## 2024-09-06 PROCEDURE — 7100000000 HC PACU RECOVERY - FIRST 15 MIN: Performed by: ORTHOPAEDIC SURGERY

## 2024-09-06 PROCEDURE — 7100000010 HC PHASE II RECOVERY - FIRST 15 MIN: Performed by: ORTHOPAEDIC SURGERY

## 2024-09-06 PROCEDURE — 29823 SHO ARTHRS SRG XTNSV DBRDMT: CPT | Performed by: ORTHOPAEDIC SURGERY

## 2024-09-06 PROCEDURE — 73030 X-RAY EXAM OF SHOULDER: CPT

## 2024-09-06 RX ORDER — SUCCINYLCHOLINE CHLORIDE 20 MG/ML
INJECTION INTRAMUSCULAR; INTRAVENOUS PRN
Status: DISCONTINUED | OUTPATIENT
Start: 2024-09-06 | End: 2024-09-06 | Stop reason: SDUPTHER

## 2024-09-06 RX ORDER — SODIUM CHLORIDE 9 MG/ML
INJECTION, SOLUTION INTRAVENOUS PRN
Status: DISCONTINUED | OUTPATIENT
Start: 2024-09-06 | End: 2024-09-06 | Stop reason: HOSPADM

## 2024-09-06 RX ORDER — DEXTROSE MONOHYDRATE 100 MG/ML
INJECTION, SOLUTION INTRAVENOUS CONTINUOUS PRN
Status: DISCONTINUED | OUTPATIENT
Start: 2024-09-06 | End: 2024-09-06 | Stop reason: HOSPADM

## 2024-09-06 RX ORDER — SODIUM CHLORIDE, SODIUM LACTATE, POTASSIUM CHLORIDE, CALCIUM CHLORIDE 600; 310; 30; 20 MG/100ML; MG/100ML; MG/100ML; MG/100ML
INJECTION, SOLUTION INTRAVENOUS CONTINUOUS
Status: DISCONTINUED | OUTPATIENT
Start: 2024-09-06 | End: 2024-09-06 | Stop reason: HOSPADM

## 2024-09-06 RX ORDER — SODIUM CHLORIDE 9 MG/ML
INJECTION, SOLUTION INTRAVENOUS PRN
Status: DISCONTINUED | OUTPATIENT
Start: 2024-09-06 | End: 2024-09-06

## 2024-09-06 RX ORDER — SODIUM CHLORIDE 0.9 % (FLUSH) 0.9 %
5-40 SYRINGE (ML) INJECTION EVERY 12 HOURS SCHEDULED
Status: DISCONTINUED | OUTPATIENT
Start: 2024-09-06 | End: 2024-09-06 | Stop reason: HOSPADM

## 2024-09-06 RX ORDER — MIDAZOLAM HYDROCHLORIDE 2 MG/2ML
2 INJECTION, SOLUTION INTRAMUSCULAR; INTRAVENOUS
Status: COMPLETED | OUTPATIENT
Start: 2024-09-06 | End: 2024-09-06

## 2024-09-06 RX ORDER — HALOPERIDOL 5 MG/ML
1 INJECTION INTRAMUSCULAR
Status: COMPLETED | OUTPATIENT
Start: 2024-09-06 | End: 2024-09-06

## 2024-09-06 RX ORDER — PROPOFOL 10 MG/ML
INJECTION, EMULSION INTRAVENOUS PRN
Status: DISCONTINUED | OUTPATIENT
Start: 2024-09-06 | End: 2024-09-06 | Stop reason: SDUPTHER

## 2024-09-06 RX ORDER — SODIUM CHLORIDE 0.9 % (FLUSH) 0.9 %
5-40 SYRINGE (ML) INJECTION PRN
Status: DISCONTINUED | OUTPATIENT
Start: 2024-09-06 | End: 2024-09-06 | Stop reason: HOSPADM

## 2024-09-06 RX ORDER — DEXAMETHASONE SODIUM PHOSPHATE 10 MG/ML
INJECTION INTRAMUSCULAR; INTRAVENOUS PRN
Status: DISCONTINUED | OUTPATIENT
Start: 2024-09-06 | End: 2024-09-06 | Stop reason: SDUPTHER

## 2024-09-06 RX ORDER — LIDOCAINE HYDROCHLORIDE AND EPINEPHRINE 10; 10 MG/ML; UG/ML
INJECTION, SOLUTION INFILTRATION; PERINEURAL PRN
Status: DISCONTINUED | OUTPATIENT
Start: 2024-09-06 | End: 2024-09-06 | Stop reason: ALTCHOICE

## 2024-09-06 RX ORDER — FENTANYL CITRATE 50 UG/ML
100 INJECTION, SOLUTION INTRAMUSCULAR; INTRAVENOUS
Status: COMPLETED | OUTPATIENT
Start: 2024-09-06 | End: 2024-09-06

## 2024-09-06 RX ORDER — LIDOCAINE HYDROCHLORIDE 10 MG/ML
1 INJECTION, SOLUTION INFILTRATION; PERINEURAL
Status: DISCONTINUED | OUTPATIENT
Start: 2024-09-06 | End: 2024-09-06 | Stop reason: HOSPADM

## 2024-09-06 RX ORDER — IBUPROFEN 600 MG/1
1 TABLET ORAL PRN
Status: DISCONTINUED | OUTPATIENT
Start: 2024-09-06 | End: 2024-09-06 | Stop reason: HOSPADM

## 2024-09-06 RX ORDER — OXYCODONE HYDROCHLORIDE 5 MG/1
5 TABLET ORAL
Status: DISCONTINUED | OUTPATIENT
Start: 2024-09-06 | End: 2024-09-06 | Stop reason: HOSPADM

## 2024-09-06 RX ORDER — ONDANSETRON 2 MG/ML
INJECTION INTRAMUSCULAR; INTRAVENOUS PRN
Status: DISCONTINUED | OUTPATIENT
Start: 2024-09-06 | End: 2024-09-06 | Stop reason: SDUPTHER

## 2024-09-06 RX ORDER — ONDANSETRON 2 MG/ML
4 INJECTION INTRAMUSCULAR; INTRAVENOUS
Status: COMPLETED | OUTPATIENT
Start: 2024-09-06 | End: 2024-09-06

## 2024-09-06 RX ORDER — ACETAMINOPHEN 500 MG
1000 TABLET ORAL ONCE
Status: COMPLETED | OUTPATIENT
Start: 2024-09-06 | End: 2024-09-06

## 2024-09-06 RX ORDER — PROMETHAZINE HYDROCHLORIDE 25 MG/1
25 TABLET ORAL EVERY 6 HOURS PRN
Qty: 20 TABLET | Refills: 0 | Status: SHIPPED | OUTPATIENT
Start: 2024-09-06

## 2024-09-06 RX ORDER — NALOXONE HYDROCHLORIDE 0.4 MG/ML
INJECTION, SOLUTION INTRAMUSCULAR; INTRAVENOUS; SUBCUTANEOUS PRN
Status: DISCONTINUED | OUTPATIENT
Start: 2024-09-06 | End: 2024-09-06 | Stop reason: HOSPADM

## 2024-09-06 RX ORDER — OXYCODONE HYDROCHLORIDE 10 MG/1
10 TABLET ORAL EVERY 6 HOURS PRN
Qty: 28 TABLET | Refills: 0 | Status: SHIPPED | OUTPATIENT
Start: 2024-09-06 | End: 2024-09-13

## 2024-09-06 RX ORDER — DEXAMETHASONE SODIUM PHOSPHATE 10 MG/ML
INJECTION, SOLUTION INTRAMUSCULAR; INTRAVENOUS
Status: COMPLETED | OUTPATIENT
Start: 2024-09-06 | End: 2024-09-06

## 2024-09-06 RX ORDER — KETOROLAC TROMETHAMINE 10 MG/1
TABLET, FILM COATED ORAL
Qty: 20 TABLET | Refills: 0 | Status: SHIPPED | OUTPATIENT
Start: 2024-09-06

## 2024-09-06 RX ORDER — LIDOCAINE HYDROCHLORIDE 20 MG/ML
INJECTION, SOLUTION EPIDURAL; INFILTRATION; INTRACAUDAL; PERINEURAL PRN
Status: DISCONTINUED | OUTPATIENT
Start: 2024-09-06 | End: 2024-09-06 | Stop reason: SDUPTHER

## 2024-09-06 RX ADMIN — ACETAMINOPHEN 1000 MG: 500 TABLET, FILM COATED ORAL at 08:56

## 2024-09-06 RX ADMIN — LIDOCAINE HYDROCHLORIDE 60 MG: 20 INJECTION, SOLUTION EPIDURAL; INFILTRATION; INTRACAUDAL; PERINEURAL at 09:27

## 2024-09-06 RX ADMIN — ONDANSETRON 4 MG: 2 INJECTION INTRAMUSCULAR; INTRAVENOUS at 09:49

## 2024-09-06 RX ADMIN — ROPIVACAINE HYDROCHLORIDE 20 ML: 5 INJECTION, SOLUTION EPIDURAL; INFILTRATION; PERINEURAL at 09:18

## 2024-09-06 RX ADMIN — ONDANSETRON 4 MG: 2 INJECTION, SOLUTION INTRAMUSCULAR; INTRAVENOUS at 11:35

## 2024-09-06 RX ADMIN — PROPOFOL 50 MG: 10 INJECTION, EMULSION INTRAVENOUS at 10:39

## 2024-09-06 RX ADMIN — DEXAMETHASONE SODIUM PHOSPHATE 10 MG: 10 INJECTION INTRAMUSCULAR; INTRAVENOUS at 09:49

## 2024-09-06 RX ADMIN — FENTANYL CITRATE 100 MCG: 50 INJECTION INTRAMUSCULAR; INTRAVENOUS at 09:20

## 2024-09-06 RX ADMIN — DEXAMETHASONE SODIUM PHOSPHATE 4 MG: 10 INJECTION, SOLUTION INTRAMUSCULAR; INTRAVENOUS at 09:18

## 2024-09-06 RX ADMIN — Medication 2000 MG: at 09:34

## 2024-09-06 RX ADMIN — MIDAZOLAM 2 MG: 1 INJECTION INTRAMUSCULAR; INTRAVENOUS at 09:20

## 2024-09-06 RX ADMIN — HALOPERIDOL LACTATE 1 MG: 5 INJECTION, SOLUTION INTRAMUSCULAR at 11:46

## 2024-09-06 RX ADMIN — PROPOFOL 150 MG: 10 INJECTION, EMULSION INTRAVENOUS at 09:27

## 2024-09-06 RX ADMIN — Medication 200 MG: at 09:27

## 2024-09-06 RX ADMIN — SODIUM CHLORIDE, POTASSIUM CHLORIDE, SODIUM LACTATE AND CALCIUM CHLORIDE: 600; 310; 30; 20 INJECTION, SOLUTION INTRAVENOUS at 09:05

## 2024-09-06 ASSESSMENT — ENCOUNTER SYMPTOMS: SHORTNESS OF BREATH: 1

## 2024-09-06 ASSESSMENT — PAIN - FUNCTIONAL ASSESSMENT: PAIN_FUNCTIONAL_ASSESSMENT: 0-10

## 2024-09-06 NOTE — OP NOTE
[facility] & [address]  [address]                            OPERATIVE REPORT      PATIENT NAME: LUCIANA DO                 : 1955  MED REC NO: [id number]                       ROOM: [facility] NO: [id number]                       ADMIT DATE: 2024  PROVIDER: Edison Tenorio Jr, MD    DATE OF SERVICE:  2024    PREOPERATIVE DIAGNOSES:       1. Rotator cuff tear, right shoulder.     2. Bicipital strain, right shoulder.     3. Glenohumeral osteoarthritis, right shoulder.     4. Acromioclavicular joint arthritis, right shoulder.     5. Lipoma, right shoulder.    POSTOPERATIVE DIAGNOSES:       1. Rotator cuff tear, right shoulder.     2. Bicipital strain, right shoulder.     3. SLAP tear, right shoulder.     4. Acromioclavicular joint arthritis, right shoulder.     5. Lipoma, right shoulder.    PROCEDURES PERFORMED:       1. Arthroscopy of right shoulder, arthroscopic subacromial decompression, arthroscopic distal clavicle resection, extensive debridement of SLAP tear, biceps labral complex, glenohumeral joint capsule, subacromial space, mini-open rotator cuff repair and biceps tenodesis.     2. Excision of lipoma, right shoulder.    SURGEON:  Edison Tenorio Jr, MD        ANESTHESIA:  General with interscalene block.    ESTIMATED BLOOD LOSS:  30 mL.    SPECIMENS REMOVED:  Specimen sent was a 6 x 5 cm fatty tumor, most likely lipoma.    INTRAOPERATIVE FINDINGS:  Pathology:     1. Type 2 acromion.     2. AC joint arthritis.     3. Type 2 SLAP tear.     4. Bicipital strain.     5. 3 cm supraspinatus rotator cuff tear.     6. 6 x 5 cm lipoma.     COMPLICATIONS:  None.    IMPLANTS:  Hardware utilized are four Marlee 5.5 anchors, one 4.5 anchor, one Omega double-double.    INDICATIONS:  The patient is a 69-year-old gentleman who fell while working on a doorknob over 3 months ago.  Preoperative physical exam, radiographs, and an MRI

## 2024-09-06 NOTE — ANESTHESIA PRE PROCEDURE
above. Patient had a previous stress test in 01/2024 which revealed a small, mild, reversible defect of the basal inferoseptal and mid inferoseptal segments.  Given his CT scan with coronary calcification and low risk stress test I have recommended medical management at this time     Neuro/Psych:   (+) psychiatric history (PTSD):            GI/Hepatic/Renal:   (+) renal disease (CKD):         ROS comment: BMI 33.   Endo/Other:    (+) DiabetesType II DM, : arthritis (gout): OA., Cancer: h/o prostate and lung cancer..  Cancer: h/o prostate and lung cancer.               Abdominal:             Vascular:          Other Findings:             Anesthesia Plan      general     ASA 3     (GETA + nerve block  Last dose Rybelsus 8/30/2024. Only clear liquids yesterday    Explained to patient he is at higher risk for post-block SOB/COLLADO due to previous lobectomy and severe JEAN)  Induction: intravenous.    MIPS: Postoperative opioids intended.  Anesthetic plan and risks discussed with patient and spouse.              Post-op pain plan if not by surgeon: single peripheral nerve block            Christi Rowan MD   9/6/2024

## 2024-09-06 NOTE — BRIEF OP NOTE
BRIEF OPERATIVE NOTE    Date of Procedure: 9/6/2024    Preoperative Diagnosis:  ROTATOR CUFF TEAR RIGHT SHOULDER      BICEPITAL STRAIN RIGHT SHOULDER      GLENOHUMERAL OSTEOARTHRITIS RIGHT SHOULDER      AC OA SHOULDER      LIPOMA RIGHT SHOULDER    Postoperative Diagnosis:  ROTATOR CUFF TEAR RIGHT SHOULDER      BICEPITAL STRAIN RIGHT SHOULDER      SLAP TEAR  RIGHT SHOULDER      AC OA SHOULDER      LIPOMA RIGHT SHOULDER          Procedure(s):  1.  ARTHROSCOPY RIGHT SHOULDER ARTHROSCOPIC SUBACROMIAL DECOMPRESSION, DISTAL CLAVICLE RESECTION, EXTENSIVE DEBRIDEMENT SLAP TEAR, BICEPS LABRAL COMPLEX, GLENOHUMERAL JOINT CAPSULE, SUBACROMIAL SPACE, MINI OPEN ROTATOR CUFF REPAIR, BICEPS TENODESIS       2.  EXCISION LIPOMA RIGHT SHOULDER    Surgeons and Role:     * Edison Tenorio Jr., MD - Primary         Assistant Staff:  NONE    Surgical Staff:  Circulator: Tayler Brambila RN  Surgical Assistant: Mellisa Bhatt  Scrub Person First: Brittnee Gabriel  Scrub Person Second: Kelsey Graham      * Missing procedure start or end time(s) *    Anesthesia:  GENERAL WITH INTERSCALENE BLOCK    Estimated Blood Loss: 30 CC.    Specimens:   ID Type Source Tests Collected by Time Destination   A : LIPOMA RIGHT SHOULDER Tissue Shoulder SURGICAL PATHOLOGY Edison Tenorio Jr., MD 9/6/2024 1058             Complications: NONE    Implants:   Implant Name Type Inv. Item Serial No.  Lot No. LRB No. Used Action   ANCHOR SUT DIA5.5MM PEEK ZIP W/ NDL - [id number]  ANCHOR SUT DIA5.5MM PEEK ZIP W/ NDL  [employer] [id number] Right 3 Implanted   ANCHOR SUT DIA5.5MM PEEK ZIP W/ NDL - [id number]  ANCHOR SUT DIA5.5MM PEEK ZIP W/ NDL  [employer] [id number] Right 1 Implanted   ANCHOR SUTURE SINGLE 4.75 MM PEEK KNOTLESS SYS OMEGA (ORDER QTY OF 5) - [id number]  ANCHOR SUTURE SINGLE 4.75 MM PEEK KNOTLESS SYS OMEGA (ORDER QTY OF 5)  [employer] [id number] Right 1 Implanted   ANCHOR SUTURE SINGLE 4.75 MM PEEK KNOTLESS SYS OMEGA

## 2024-09-06 NOTE — ANESTHESIA POSTPROCEDURE EVALUATION
Department of Anesthesiology  Postprocedure Note    Patient: Tripp Rowe  MRN: [id number]  YOB: 1955  Date of evaluation: 9/6/2024    Procedure Summary       Date: 09/06/24 Room / Location: [facility]  / [facility]    Anesthesia Start: 0919 Anesthesia Stop: 1122    Procedure: 1.  ARTHROSCOPY RIGHT SHOULDER ARTHROSCOPIC SUBACROMIAL DECOMPRESSION, DISTAL CLAVICLE RESECTION, EXTENSIVE DEBRIDEMENT SLAP TEAR, BICEPS LABRAL COMPLEX, GLENOHUMERAL JOINT CAPSULE, SUBACROMIAL SPACE, CHONDROPLASTY HUMERAL HEAD AND GLENOID, MINI OPEN ROTATOR CUFF REPAIR, BICEPS TENODESIS                                       2.  EXCISION LIPOMA RIGHT SHOULDER (Right: Shoulder) Diagnosis:       Traumatic complete tear of right rotator cuff, subsequent encounter      Strain of muscle, fascia and tendon of long head of biceps, right arm, initial encounter      Osteoarthritis of right glenohumeral joint      Degenerative joint disease of right acromioclavicular joint      (Traumatic complete tear of right rotator cuff, subsequent encounter [S46.011D])      (Strain of muscle, fascia and tendon of long head of biceps, right arm, initial encounter [S46.111A])      (Osteoarthritis of right glenohumeral joint [M19.011])      (Degenerative joint disease of right acromioclavicular joint [M19.011])    Surgeons: Edison Tenorio Jr., MD Responsible Provider: Christi Rowan MD    Anesthesia Type: General ASA Status: 3            Anesthesia Type: General    Alyson Phase I: Alyson Score: 8    Alyson Phase II: Alyson Score: 10    Anesthesia Post Evaluation    Patient location during evaluation: PACU  Patient participation: complete - patient participated  Level of consciousness: awake and alert  Airway patency: patent  Nausea: well controlled.  Cardiovascular status: acceptable.  Respiratory status: acceptable  Hydration status: stable  Pain management: adequate    No notable events documented.

## 2024-09-06 NOTE — H&P
Update History & Physical    The patient's History and Physical of August 6, 2024 was reviewed with the patient and I examined the patient. There was no change. The surgical site was confirmed by the patient and me.       Plan: The risks, benefits, expected outcome, and alternative to the recommended procedure have been discussed with the patient. Patient understands and wants to proceed with the procedure.     Electronically signed by YOVANA HACKETT JR, MD on 9/6/2024 at 6:00 AM

## 2024-09-06 NOTE — DISCHARGE INSTRUCTIONS
INSTRUCTIONS FOLLOWING ARTHROSCOPY SURGERY  Dr. Tenorio [phone]    ACTIVITY   As tolerated and as directed by your doctor   Elevate surgery site first 48 hours.   Use arm sling per your doctor's instructions.   Bathe or shower as directed by your doctor. (Tomorrow after changing to waterproof dressing)    DIET   Clear liquids until no nausea or vomiting; then light diet for the first day   Advance to regular diet on second day, unless your doctor orders otherwise.   If nausea and vomiting continues, call your doctor.    PAIN   Take pain medication as directed by your doctor.   Call your doctor if pain is NOT relieved by medication.   DO NOT take aspirin or blood thinners until directed by your doctor.    DRESSING CARE: Follow all dressing care instructions provided by Dr. Tenorio.  Waterproof bandaid tomorrow    FOLLOW-UP PHONE CALLS   Someone from Dr Tenorio's office will call tomorrow with further instructions.   If you have any problems or concerns, call your doctor as needed.    CALL YOUR DOCTOR IF   Excessive bleeding that does not stop after holding mild pressure over the area   Temperature of 101°F or above   Redness, excessive swelling or bruising, and/or green or yellow, smelly discharge from incision    AFTER ANESTHESIA   For the next 24 hours: DO NOT Drive, Drink alcoholic beverages, or Make important decisions.   Be aware of dizziness following anesthesia and while taking pain medication.    ICE 24-48 hours continuously

## 2024-09-06 NOTE — ANESTHESIA PROCEDURE NOTES
Peripheral Block    Patient location during procedure: pre-op  Reason for block: post-op pain management and at surgeon's request  Start time: 9/6/2024 9:18 AM  End time: 9/6/2024 9:20 AM  Staffing  Performed: anesthesiologist   Anesthesiologist: Christi Rowan MD  Performed by: Christi Rowan MD  Authorized by: Christi Rowan MD    Preanesthetic Checklist  Completed: patient identified, IV checked, site marked, risks and benefits discussed, surgical/procedural consents, equipment checked, pre-op evaluation, timeout performed, anesthesia consent given, oxygen available and monitors applied/VS acknowledged  Peripheral Block   Patient position: sitting  Prep: ChloraPrep  Provider prep: sterile gloves and mask  Patient monitoring: cardiac monitor, continuous pulse ox, frequent blood pressure checks, IV access, oxygen and responsive to questions  Block type: Brachial plexus  Interscalene  Laterality: right  Injection technique: single-shot  Guidance: ultrasound guided    Needle   Needle type: insulated echogenic nerve stimulator needle   Needle gauge: 20 G  Needle localization: ultrasound guidance  Needle length: 10 cm  Assessment   Injection assessment: negative aspiration for heme, no paresthesia on injection, local visualized surrounding nerve on ultrasound and no intravascular symptoms  Paresthesia pain: none  Slow fractionated injection: yes  Hemodynamics: stable  Outcomes: uncomplicated and patient tolerated procedure well    Additional Notes  Ultrasound image taken and stored in chart   Medications Administered  dexAMETHasone (DECADRON) (PF) 10 mg/mL injection - Other   4 mg - 9/6/2024 9:18:00 AM  ROPivacaine 0.5% with EPINEPHrine 1:[id number] injection (ANESTHESIA USE ONLY) (Mixture components: EPINEPHrine PF 1 MG/ML Soln, 0.005 mL; ROPivacaine 0.5% Soln, 1 mL) - Perineural   20 mL - 9/6/2024 9:18:00 AM

## 2024-09-18 ENCOUNTER — OFFICE VISIT (OUTPATIENT)
Dept: ORTHOPEDIC SURGERY | Age: 69
End: 2024-09-18

## 2024-09-18 DIAGNOSIS — Z48.89 ENCOUNTER FOR POSTOPERATIVE CARE: Primary | ICD-10-CM

## 2024-09-18 PROCEDURE — 99024 POSTOP FOLLOW-UP VISIT: CPT | Performed by: ORTHOPAEDIC SURGERY

## 2024-09-23 ENCOUNTER — HOSPITAL ENCOUNTER (OUTPATIENT)
Dept: PHYSICAL THERAPY | Age: 69
Setting detail: RECURRING SERIES
Discharge: HOME | End: 2024-09-26
Payer: MEDICARE

## 2024-09-23 DIAGNOSIS — M62.81 MUSCLE WEAKNESS (GENERALIZED): ICD-10-CM

## 2024-09-23 DIAGNOSIS — Z48.89 ENCOUNTER FOR POSTOPERATIVE CARE: Primary | ICD-10-CM

## 2024-09-23 DIAGNOSIS — M25.511 ACUTE PAIN OF RIGHT SHOULDER: ICD-10-CM

## 2024-09-23 PROCEDURE — 97140 MANUAL THERAPY 1/> REGIONS: CPT

## 2024-09-23 PROCEDURE — 97162 PT EVAL MOD COMPLEX 30 MIN: CPT

## 2024-09-23 PROCEDURE — 97110 THERAPEUTIC EXERCISES: CPT

## 2024-09-24 ASSESSMENT — PAIN SCALES - GENERAL: PAINLEVEL_OUTOF10: 5

## 2024-10-01 ENCOUNTER — HOSPITAL ENCOUNTER (OUTPATIENT)
Dept: PHYSICAL THERAPY | Age: 69
Setting detail: RECURRING SERIES
End: 2024-10-01
Payer: MEDICARE

## 2024-10-01 NOTE — PROGRESS NOTES
PHYSICAL THERAPY  [facility] [place] 10/1/24    Patient did not show up to appointment today.

## 2024-10-02 ENCOUNTER — HOSPITAL ENCOUNTER (OUTPATIENT)
Dept: PHYSICAL THERAPY | Age: 69
Setting detail: RECURRING SERIES
Discharge: HOME | End: 2024-10-05
Payer: MEDICARE

## 2024-10-02 PROCEDURE — 97110 THERAPEUTIC EXERCISES: CPT

## 2024-10-02 PROCEDURE — 97140 MANUAL THERAPY 1/> REGIONS: CPT

## 2024-10-02 NOTE — PROGRESS NOTES
treatment session: Next visit will focus on participation in graded exercise program to improve activity tolerance and functional indep..    >Total Treatment Billable Duration:  55 minutes   Time In: 1530  Time Out: 1630    Henok Augustin PT         Charge Capture  Events  [employer] Portal  Appt Desk  Attendance Report     Future Appointments   Date Time Provider Department Center   10/7/2024  8:00 AM Henok Augustin PT [facility]   10/9/2024  8:00 AM Henok Augustin PT [facility]   10/15/2024  8:00 AM Henok Augustin PT [facility]   10/16/2024 10:15 AM Edison Tenorio Jr., MD [facility]   10/17/2024  8:00 AM Henok Augustin PT [facility]   10/29/2024  8:00 AM Henok Augustin, PT [facility]   10/31/2024  8:00 AM Henok Augustin PT [facility]

## 2024-10-02 NOTE — PROGRESS NOTES
Tripp Rowe  : 1955  Primary: [employer] (Medicare Managed)  Secondary:  FOR LIFE MEDICARE SUPP [facility] [place] [facility]  [address]  [facility] [address]  Phone: [phone]  Fax: [phone] Plan Frequency: 2 times a week for 12 weeks (With potential to decrease to 1x week based on patient presentation, indep with HEP, and patient progression)  Plan of Care/Certification Expiration Date: 24 (POC starting 24)        Plan of Care/Certification Expiration Date:  Plan of Care/Certification Expiration Date: 24 (POC starting 24)    Frequency/Duration: Plan Frequency: 2 times a week for 12 weeks (With potential to decrease to 1x week based on patient presentation, indep with HEP, and patient progression)      Time In/Out:   Time In: 1530  Time Out: 1630      PT Visit Info:         Visit Count:  1    OUTPATIENT PHYSICAL THERAPY:   Treatment Note 2024       Episode  (R shoulder pain s/p surgery)               Treatment Diagnosis:    Encounter for postoperative care  Acute pain of right shoulder  Muscle weakness (generalized)  Medical/Referring Diagnosis:    Encounter for postoperative care [Z48.89]    Referring Physician:  Edison Tenorio Jr., MD MD Orders:  PT Eval and Treat   Return MD Appt:  10/16/24   Date of Onset:  Onset Date: 24 (status post arthroscopy right shoulder ASD, ADCR, extensive debridement SLAP tear, biceps labral complex, glenohumeral joint capsule, subacromial space, mini open rotator cuff repair and biceps tenodesis as well as excision of a lipoma right shoulder)     Allergies:   Patient has no known allergies.  Restrictions/Precautions:   None      Interventions Planned (Treatment may consist of any combination of the following):     See Assessment Note    Subjective Comments:   Patient reports feeling stiff today.  Initial Pain Level::     3 /10  Post Session Pain Level:       3 /10  Medications

## 2024-10-03 ENCOUNTER — HOSPITAL ENCOUNTER (OUTPATIENT)
Dept: PHYSICAL THERAPY | Age: 69
Setting detail: RECURRING SERIES
Discharge: HOME | End: 2024-10-06
Payer: MEDICARE

## 2024-10-03 PROCEDURE — 97140 MANUAL THERAPY 1/> REGIONS: CPT

## 2024-10-03 PROCEDURE — 97110 THERAPEUTIC EXERCISES: CPT

## 2024-10-07 ENCOUNTER — HOSPITAL ENCOUNTER (OUTPATIENT)
Dept: PHYSICAL THERAPY | Age: 69
Setting detail: RECURRING SERIES
Discharge: HOME | End: 2024-10-10
Payer: MEDICARE

## 2024-10-07 PROCEDURE — 97110 THERAPEUTIC EXERCISES: CPT

## 2024-10-07 PROCEDURE — 97140 MANUAL THERAPY 1/> REGIONS: CPT

## 2024-10-07 NOTE — PROGRESS NOTES
Tripp Rowe  : 1955  Primary: [employer] (Medicare Managed)  Secondary:  FOR LIFE MEDICARE SUPP [facility] [place]  [address]  [facility] [address]  Phone: [phone]  Fax: [phone] Plan Frequency: 2 times a week for 12 weeks (With potential to decrease to 1x week based on patient presentation, indep with HEP, and patient progression)  Plan of Care/Certification Expiration Date: 24 (POC starting 24)        Plan of Care/Certification Expiration Date:  Plan of Care/Certification Expiration Date: 24 (POC starting 24)    Frequency/Duration: Plan Frequency: 2 times a week for 12 weeks (With potential to decrease to 1x week based on patient presentation, indep with HEP, and patient progression)      Time In/Out:   Time In: 800  Time Out: 900      PT Visit Info:         Visit Count:  4    OUTPATIENT PHYSICAL THERAPY:   Treatment Note 2024       Episode  (R shoulder pain s/p surgery)               Treatment Diagnosis:    Encounter for postoperative care  Acute pain of right shoulder  Muscle weakness (generalized)  Medical/Referring Diagnosis:    Encounter for postoperative care [Z48.89]    Referring Physician:  Edison Tenorio Jr., MD MD Orders:  PT Eval and Treat   Return MD Appt:  10/16/24   Date of Onset:  Onset Date: 24 (status post arthroscopy right shoulder ASD, ADCR, extensive debridement SLAP tear, biceps labral complex, glenohumeral joint capsule, subacromial space, mini open rotator cuff repair and biceps tenodesis as well as excision of a lipoma right shoulder)     Allergies:   Patient has no known allergies.  Restrictions/Precautions:   None      Interventions Planned (Treatment may consist of any combination of the following):     See Assessment Note    Subjective Comments:   Patient reports being busy and he was unable to perform HEP over the week.   Initial Pain Level::     1 /10  Post Session

## 2024-10-09 ENCOUNTER — HOSPITAL ENCOUNTER (OUTPATIENT)
Dept: PHYSICAL THERAPY | Age: 69
Setting detail: RECURRING SERIES
Discharge: HOME | End: 2024-10-12
Payer: MEDICARE

## 2024-10-09 PROCEDURE — 97110 THERAPEUTIC EXERCISES: CPT

## 2024-10-09 PROCEDURE — 97140 MANUAL THERAPY 1/> REGIONS: CPT

## 2024-10-14 NOTE — PROGRESS NOTES
Tripp Rowe  : 1955  Primary: [employer] (Medicare Managed)  Secondary:  FOR LIFE MEDICARE SUPP [facility] [place]  [address]  [facility] [address]  Phone: [phone]  Fax: [phone] Plan Frequency: 2 times a week for 12 weeks (With potential to decrease to 1x week based on patient presentation, indep with HEP, and patient progression)  Plan of Care/Certification Expiration Date: 24 (POC starting 24)        Plan of Care/Certification Expiration Date:  Plan of Care/Certification Expiration Date: 24 (POC starting 24)    Frequency/Duration: Plan Frequency: 2 times a week for 12 weeks (With potential to decrease to 1x week based on patient presentation, indep with HEP, and patient progression)      Time In/Out:   Time In: 800  Time Out: 900      PT Visit Info:         Visit Count:  6   OUTPATIENT PHYSICAL THERAPY:   Treatment Note 2024       Episode  (R shoulder pain s/p surgery)               Treatment Diagnosis:    Encounter for postoperative care  Acute pain of right shoulder  Muscle weakness (generalized)  Medical/Referring Diagnosis:    Encounter for postoperative care [Z48.89]    Referring Physician:  Edison Tenorio Jr., MD MD Orders:  PT Eval and Treat   Return MD Appt:  10/16/24   Date of Onset:  Onset Date: 24 (status post arthroscopy right shoulder ASD, ADCR, extensive debridement SLAP tear, biceps labral complex, glenohumeral joint capsule, subacromial space, mini open rotator cuff repair and biceps tenodesis as well as excision of a lipoma right shoulder)     Allergies:   Patient has no known allergies.  Restrictions/Precautions:   None      Interventions Planned (Treatment may consist of any combination of the following):     See Assessment Note    Subjective Comments:   Patient reports progressing ROM at home with exercises.   Initial Pain Level::     1 /10  Post Session Pain Level:       1

## 2024-10-15 ENCOUNTER — HOSPITAL ENCOUNTER (OUTPATIENT)
Dept: PHYSICAL THERAPY | Age: 69
Setting detail: RECURRING SERIES
Discharge: HOME | End: 2024-10-18
Payer: MEDICARE

## 2024-10-15 PROCEDURE — 97110 THERAPEUTIC EXERCISES: CPT

## 2024-10-15 PROCEDURE — 97140 MANUAL THERAPY 1/> REGIONS: CPT

## 2024-10-16 ENCOUNTER — OFFICE VISIT (OUTPATIENT)
Dept: ORTHOPEDIC SURGERY | Age: 69
End: 2024-10-16

## 2024-10-16 DIAGNOSIS — Z48.89 ENCOUNTER FOR POSTOPERATIVE CARE: Primary | ICD-10-CM

## 2024-10-16 PROCEDURE — 99024 POSTOP FOLLOW-UP VISIT: CPT | Performed by: ORTHOPAEDIC SURGERY

## 2024-10-16 NOTE — PROGRESS NOTES
Name: Tripp Rowe  YOB: 1955  Gender: male  MRN: [id number]              HPI: Tripp Rowe is a 69 y.o. right-hand-dominant male 6 weeks status post arthroscopy right shoulder ASD, ADCR, extensive debridement SLAP tear, biceps labral complex, glenohumeral joint capsule, subacromial space, mini open rotator cuff repair and biceps tenodesis as well as excision of a lipoma right shoulder.  Operative findings notable for 3 cm supraspinatus rotator cuff tear and a 6 x 5 cm lipoma.  He returns and is doing well      ROS/Meds/PSH/PMH/FH/SH: A ten system review of systems was performed and is negative other than what is in the HPI.   Tobacco:  reports that he quit smoking about 29 years ago. His smoking use included cigarettes. He has never used smokeless tobacco.  There were no vitals taken for this visit.     Physical Examination:  He is awake alert pleasant gentleman ambulating without difficulty  He has restricted range of cervical spine motion without radicular findings    The left shoulder has 0 to 180 degrees of active and 0 to 180 degrees passive forward elevation.   Internal rotation is to T6.  External rotation is to 60 degrees at the side.   In the 90 degree abducted position 90 degrees of external and 90 degrees internal rotation  The AC joint is non-tender  SC joint is non-tender.   Greater tuberosity is non-tender.  negative biceps  Negative O'Briens sign  negative lift-off sign  Negative belly press sign  Negative bear huggers sign  negative drop sign  negative hornblower's sign  No posterior glenohumeral joint line tenderness.   No evident excessive external rotation  Rotator cuff strength is 5/5.  negative external rotation stress test.   Negative empty can sign  There is no evident anterior or posterior apprehension with a negative sulcus sign.   No instability  negative external and internal Rotation lag sign  Neurovascularly intact.      The right shoulder incisions have

## 2024-10-17 ENCOUNTER — HOSPITAL ENCOUNTER (OUTPATIENT)
Dept: PHYSICAL THERAPY | Age: 69
Setting detail: RECURRING SERIES
End: 2024-10-17
Payer: MEDICARE

## 2024-10-22 ENCOUNTER — APPOINTMENT (OUTPATIENT)
Dept: PHYSICAL THERAPY | Age: 69
End: 2024-10-22
Payer: MEDICARE

## 2024-10-24 ENCOUNTER — APPOINTMENT (OUTPATIENT)
Dept: PHYSICAL THERAPY | Age: 69
End: 2024-10-24
Payer: MEDICARE

## 2024-10-28 NOTE — PROGRESS NOTES
Tripp Rowe  : 1955  Primary: [employer] (Medicare Managed)  Secondary:  FOR LIFE MEDICARE SUPP [facility] [place]  [address]  [facility] [address]  Phone: [phone]  Fax: [phone] Plan Frequency: 2 times a week for 12 weeks (With potential to decrease to 1x week based on patient presentation, indep with HEP, and patient progression)  Plan of Care/Certification Expiration Date: 24 (POC starting 24)        Plan of Care/Certification Expiration Date:  Plan of Care/Certification Expiration Date: 24 (POC starting 24)    Frequency/Duration: Plan Frequency: 2 times a week for 12 weeks (With potential to decrease to 1x week based on patient presentation, indep with HEP, and patient progression)      Time In/Out:   Time In: 800  Time Out: 900      PT Visit Info:         Visit Count:  8   OUTPATIENT PHYSICAL THERAPY:   Treatment Note 2024       Episode  (R shoulder pain s/p surgery)               Treatment Diagnosis:    Encounter for postoperative care  Acute pain of right shoulder  Muscle weakness (generalized)  Medical/Referring Diagnosis:    Encounter for postoperative care [Z48.89]    Referring Physician:  Edison Tenorio Jr., MD MD Orders:  PT Eval and Treat   Return MD Appt:  10/16/24   Date of Onset:  Onset Date: 24 (status post arthroscopy right shoulder ASD, ADCR, extensive debridement SLAP tear, biceps labral complex, glenohumeral joint capsule, subacromial space, mini open rotator cuff repair and biceps tenodesis as well as excision of a lipoma right shoulder)     Allergies:   Patient has no known allergies.  Restrictions/Precautions:   None      Interventions Planned (Treatment may consist of any combination of the following):     See Assessment Note    Subjective Comments:   Patient reports feeling a little stiff following his cruise.     Initial Pain Level::     0 /10  Post Session Pain Level:

## 2024-10-29 ENCOUNTER — HOSPITAL ENCOUNTER (OUTPATIENT)
Dept: PHYSICAL THERAPY | Age: 69
Setting detail: RECURRING SERIES
Discharge: HOME | End: 2024-11-01
Payer: MEDICARE

## 2024-10-29 PROCEDURE — 97140 MANUAL THERAPY 1/> REGIONS: CPT

## 2024-10-29 PROCEDURE — 97110 THERAPEUTIC EXERCISES: CPT

## 2024-10-31 ENCOUNTER — HOSPITAL ENCOUNTER (OUTPATIENT)
Dept: PHYSICAL THERAPY | Age: 69
Setting detail: RECURRING SERIES
Discharge: HOME | End: 2024-11-03
Payer: MEDICARE

## 2024-10-31 PROCEDURE — 97140 MANUAL THERAPY 1/> REGIONS: CPT

## 2024-10-31 PROCEDURE — 97110 THERAPEUTIC EXERCISES: CPT

## 2024-10-31 NOTE — PROGRESS NOTES
Tripp Rowe  : 1955  Primary: [employer] (Medicare Managed)  Secondary:  FOR LIFE MEDICARE SUPP [facility] [place]  [address]  [facility] [address]  Phone: [phone]  Fax: [phone] Plan Frequency: 2 times a week for 12 weeks (With potential to decrease to 1x week based on patient presentation, indep with HEP, and patient progression)  Plan of Care/Certification Expiration Date: 24 (POC starting 24)        Plan of Care/Certification Expiration Date:  Plan of Care/Certification Expiration Date: 24 (POC starting 24)    Frequency/Duration: Plan Frequency: 2 times a week for 12 weeks (With potential to decrease to 1x week based on patient presentation, indep with HEP, and patient progression)      Time In/Out:   Time In: 800  Time Out: 900      PT Visit Info:         Visit Count:  8   OUTPATIENT PHYSICAL THERAPY:   Treatment Note 2024       Episode  (R shoulder pain s/p surgery)               Treatment Diagnosis:    Encounter for postoperative care  Acute pain of right shoulder  Muscle weakness (generalized)  Medical/Referring Diagnosis:    Encounter for postoperative care [Z48.89]    Referring Physician:  Edison Tenorio Jr., MD MD Orders:  PT Eval and Treat   Return MD Appt:  10/16/24   Date of Onset:  Onset Date: 24 (status post arthroscopy right shoulder ASD, ADCR, extensive debridement SLAP tear, biceps labral complex, glenohumeral joint capsule, subacromial space, mini open rotator cuff repair and biceps tenodesis as well as excision of a lipoma right shoulder)     Allergies:   Patient has no known allergies.  Restrictions/Precautions:   None      Interventions Planned (Treatment may consist of any combination of the following):     See Assessment Note    Subjective Comments:   Patient reports feeling a little stiff following his cruise.     Initial Pain Level::     0 /10  Post Session Pain Level:

## 2024-10-31 NOTE — PROGRESS NOTES
Tripp Rowe  : 1955  Primary: [employer] (Medicare Managed)  Secondary:  FOR LIFE MEDICARE SUPP [facility] [place]  [address] [facility] [address]  Phone: [phone]  Fax: [phone] Plan Frequency: 2 times a week for 12 weeks (With potential to decrease to 1x week based on patient presentation, indep with HEP, and patient progression)    Plan of Care/Certification Expiration Date: 24 (POC starting 24)        Plan of Care/Certification Expiration Date:  Plan of Care/Certification Expiration Date: 24 (POC starting 24)    Frequency/Duration: Plan Frequency: 2 times a week for 12 weeks (With potential to decrease to 1x week based on patient presentation, indep with HEP, and patient progression)      Time In/Out:   Time In: 0800  Time Out: 0900      PT Visit Info:    Progress Note Counter: 8      Visit Count:  8                OUTPATIENT PHYSICAL THERAPY:             Progress Report 10/31/2024               Episode (R shoulder pain s/p surgery)         Treatment Diagnosis:     No data found  Medical/Referring Diagnosis:    Encounter for postoperative care [Z48.89]    Referring Physician:  Edison Tenorio Jr., MD MD Orders:  PT Eval and Treat   Return MD Appt:  10/16/24  Date of Onset:  Onset Date: 24 (status post arthroscopy right shoulder ASD, ADCR, extensive debridement SLAP tear, biceps labral complex, glenohumeral joint capsule, subacromial space, mini open rotator cuff repair and biceps tenodesis as well as excision of a lipoma right shoulder)     Allergies:  Patient has no known allergies.  Restrictions/Precautions:    None      Medications Last Reviewed:  10/31/2024        OBJECTIVE     Patient denies any headaches, changes in vision, dizziness, vertigo, nausea, drop attacks, black outs, tinnitus, dysphagia, dysarthria, LE symptoms or bowel/bladder dysfunction.    Observation/Orthostatic Postural

## 2024-11-05 ENCOUNTER — APPOINTMENT (OUTPATIENT)
Dept: PHYSICAL THERAPY | Age: 69
End: 2024-11-05
Payer: MEDICARE

## 2024-11-05 ENCOUNTER — HOSPITAL ENCOUNTER (OUTPATIENT)
Dept: PHYSICAL THERAPY | Age: 69
Setting detail: RECURRING SERIES
End: 2024-11-05
Payer: MEDICARE

## 2024-11-05 ENCOUNTER — HOSPITAL ENCOUNTER (OUTPATIENT)
Dept: PHYSICAL THERAPY | Age: 69
Setting detail: RECURRING SERIES
Discharge: HOME | End: 2024-11-08
Payer: MEDICARE

## 2024-11-05 PROCEDURE — 97140 MANUAL THERAPY 1/> REGIONS: CPT

## 2024-11-05 PROCEDURE — 97110 THERAPEUTIC EXERCISES: CPT

## 2024-11-05 NOTE — PROGRESS NOTES
Tripp Rowe  : 1955  Primary: [employer] (Medicare Managed)  Secondary:  FOR LIFE MEDICARE SUPP [facility] [place]  [address]  [facility] [address]  Phone: [phone]  Fax: [phone] Plan Frequency: 2 times a week for 12 weeks (With potential to decrease to 1x week based on patient presentation, indep with HEP, and patient progression)  Plan of Care/Certification Expiration Date: 24 (POC starting 24)        Plan of Care/Certification Expiration Date:  Plan of Care/Certification Expiration Date: 24 (POC starting 24)    Frequency/Duration: Plan Frequency: 2 times a week for 12 weeks (With potential to decrease to 1x week based on patient presentation, indep with HEP, and patient progression)      Time In/Out:   Time In: 800  Time Out: 856    PT Visit Info:    Progress note counter: 1  Visit Count: 9   OUTPATIENT PHYSICAL THERAPY:   Treatment Note 2024       Episode  (R shoulder pain s/p surgery)               Treatment Diagnosis:    Encounter for postoperative care  Acute pain of right shoulder  Muscle weakness (generalized)  Medical/Referring Diagnosis:    Encounter for postoperative care [Z48.89]    Referring Physician:  Edison Tenorio Jr., MD MD Orders:  PT Eval and Treat   Return MD Appt:  10/16/24   Date of Onset:  Onset Date: 24 (status post arthroscopy right shoulder ASD, ADCR, extensive debridement SLAP tear, biceps labral complex, glenohumeral joint capsule, subacromial space, mini open rotator cuff repair and biceps tenodesis as well as excision of a lipoma right shoulder)     Allergies:   Patient has no known allergies.  Restrictions/Precautions:   None      Interventions Planned (Treatment may consist of any combination of the following):     See Assessment Note    Subjective Comments: Patient reported he felt good today with little to no pain prior to therapy.     Initial Pain Level::

## 2024-11-08 ENCOUNTER — APPOINTMENT (OUTPATIENT)
Dept: PHYSICAL THERAPY | Age: 69
End: 2024-11-08
Payer: MEDICARE

## 2024-11-08 ENCOUNTER — HOSPITAL ENCOUNTER (OUTPATIENT)
Dept: PHYSICAL THERAPY | Age: 69
Setting detail: RECURRING SERIES
Discharge: HOME | End: 2024-11-11
Payer: MEDICARE

## 2024-11-08 PROCEDURE — 97110 THERAPEUTIC EXERCISES: CPT

## 2024-11-08 PROCEDURE — 97140 MANUAL THERAPY 1/> REGIONS: CPT

## 2024-11-08 NOTE — PROGRESS NOTES
Tripp Rowe  : 1955  Primary: [employer] (Medicare Managed)  Secondary:  FOR LIFE MEDICARE SUPP [facility] [place]  [address] [place] [facility] [address]  Phone: [phone]  Fax: [phone] Plan Frequency: 2 times a week for 12 weeks (With potential to decrease to 1x week based on patient presentation, indep with HEP, and patient progression)  Plan of Care/Certification Expiration Date: 24 (POC starting 24)        Plan of Care/Certification Expiration Date:  Plan of Care/Certification Expiration Date: 24 (POC starting 24)    Frequency/Duration: Plan Frequency: 2 times a week for 12 weeks (With potential to decrease to 1x week based on patient presentation, indep with HEP, and patient progression)      Time In/Out:   Time In: 758  Time Out: 852    PT Visit Info:    Progress note counter: 2  Visit Count: 10   OUTPATIENT PHYSICAL THERAPY:   Treatment Note 2024       Episode  (R shoulder pain s/p surgery)               Treatment Diagnosis:    Encounter for postoperative care  Acute pain of right shoulder  Muscle weakness (generalized)  Medical/Referring Diagnosis:    Encounter for postoperative care [Z48.89]    Referring Physician:  Edison Tenorio Jr., MD MD Orders:  PT Eval and Treat   Return MD Appt:  10/16/24   Date of Onset:  Onset Date: 24 (status post arthroscopy right shoulder ASD, ADCR, extensive debridement SLAP tear, biceps labral complex, glenohumeral joint capsule, subacromial space, mini open rotator cuff repair and biceps tenodesis as well as excision of a lipoma right shoulder)     Allergies:   Patient has no known allergies.  Restrictions/Precautions:   None      Interventions Planned (Treatment may consist of any combination of the following):     See Assessment Note    Subjective Comments: Patient reported \"some soreness\" from his previous therapy session.     Initial Pain Level:: 3/10  Post

## 2024-11-11 ENCOUNTER — HOSPITAL ENCOUNTER (OUTPATIENT)
Dept: PHYSICAL THERAPY | Age: 69
Setting detail: RECURRING SERIES
Discharge: HOME | End: 2024-11-14
Payer: MEDICARE

## 2024-11-11 DIAGNOSIS — M62.81 MUSCLE WEAKNESS (GENERALIZED): ICD-10-CM

## 2024-11-11 DIAGNOSIS — Z48.89 ENCOUNTER FOR POSTOPERATIVE CARE: Primary | ICD-10-CM

## 2024-11-11 DIAGNOSIS — M25.511 ACUTE PAIN OF RIGHT SHOULDER: ICD-10-CM

## 2024-11-11 PROCEDURE — 97110 THERAPEUTIC EXERCISES: CPT

## 2024-11-11 PROCEDURE — 97140 MANUAL THERAPY 1/> REGIONS: CPT

## 2024-11-11 NOTE — THERAPY RECERTIFICATION
or bowel/bladder dysfunction.    Observation/Orthostatic Postural Assessment:          Patient sits and stands with mild rounded shoulder and forward head; no guarded positioning of right shoulder girdle observed today.     Palpation:          Patient tender to palpation incision regions, ant deltoid and upper trap.     ROM:          NT: not tested  AROM/ PROM Left (degrees) Right (degrees)   Shoulder Flexion 175 120/150   Shoulder Abduction 160 120   Shoulder Internal Rotation  75 54   Functional Internal Rotation NT NT   Shoulder External Rotation 80 65   Functional External Rotation NT  NT     Strength:            Motion Tested Left   (*/5) Right  (*/5)   Shoulder Flexion 5 4   Scaption 5 4   Shoulder Abduction 5 4   Shoulder Internal Rotation  5 4   Shoulder External Rotation 4+ 4   Elbow Flexion 5 4+   Elbow Extension 5 4+   Wrist Flexion 5 4+   Wrist Extension 5 4+    (in lbs): arm at side with elbow flexed to 90 degrees Next assessment Next assessment      Special Tests:  Not performed secondary to acute status s/p surgery.     Passive Accessory Motion:         Decreased R glenohumeral motion    Neurological Screen:              Myotomes: Key muscle strength testing through bilateral UE is WFL.   Dermatomes: Sensation to light touch for bilateral UE is intact and WFL.       Functional Mobility:         Unable to perform overhead reach R UE.     Balance:          Not assessed.        Outcome Measure:   Tool Used: Disabilities of the Arm, Shoulder and Hand (DASH) Questionnaire - Quick Version  Score:  Initial: 33/55  Most Recent: 18/55 (Date: 10/31/24 )   Interpretation of Score: The DASH is designed to measure the activities of daily living in person's with upper extremity dysfunction or pain.  Each section is scored on a 1-5 scale, 5 representing the greatest disability.  The scores of each section are added together for a total score of 55.      ASSESSMENT   Initial Assessment:  Patient is a 69 year old

## 2024-11-11 NOTE — PROGRESS NOTES
technique with exercises.     MANUAL THERAPY: (8 minutes):   Joint mobilization and Soft tissue mobilization was utilized and necessary because of the patient's restricted joint motion and painful spasm.   Soft tissue mobilization to pec and upper trap  Gh joint AP grade II/III    MODALITIES: (0 minutes):        None today      HEP: As above; handouts given to patient for all exercises.       Treatment/Session Summary:    Treatment Assessment: Excellent tolerance to exercises program observed today; HEP handout updated and resistance bands provided to ensure Hep compliance and understanding.      Communication/Consultation:  None today  Equipment provided today:  None  Recommendations/Intent for next treatment session: Next visit will focus on participation in graded exercise program to improve activity tolerance and functional indep..    >Total Treatment Billable Duration:  54 minutes   Time In: 0802   Time Out: 0859    Wayne Gottlieb PT         Charge Capture  Events  [employer] Portal  Appt Desk  Attendance Report     Future Appointments   Date Time Provider Department Center   12/4/2024  8:45 AM [facility]

## 2024-11-13 ENCOUNTER — APPOINTMENT (OUTPATIENT)
Dept: PHYSICAL THERAPY | Age: 69
End: 2024-11-13
Payer: MEDICARE

## 2024-11-15 ENCOUNTER — APPOINTMENT (OUTPATIENT)
Dept: PHYSICAL THERAPY | Age: 69
End: 2024-11-15
Payer: MEDICARE

## 2024-11-20 ENCOUNTER — APPOINTMENT (OUTPATIENT)
Dept: PHYSICAL THERAPY | Age: 69
End: 2024-11-20
Payer: MEDICARE

## 2024-11-20 ENCOUNTER — HOSPITAL ENCOUNTER (OUTPATIENT)
Dept: PHYSICAL THERAPY | Age: 69
Setting detail: RECURRING SERIES
Discharge: HOME | End: 2024-11-23
Payer: MEDICARE

## 2024-11-20 PROCEDURE — 97110 THERAPEUTIC EXERCISES: CPT

## 2024-11-20 NOTE — PROGRESS NOTES
Tripp Rowe  : 1955  Primary: [employer] (Medicare Managed)  Secondary:  FOR LIFE MEDICARE SUPP [facility] [place]  [address] [place] [facility] [address]  Phone: [phone]  Fax: [phone] Plan Frequency: 2 times a week for 12 weeks (With potential to decrease to 1x week based on patient presentation, indep with HEP, and patient progression)  Plan of Care/Certification Expiration Date: 24 (POC starting 24)        Plan of Care/Certification Expiration Date:  Plan of Care/Certification Expiration Date: 24 (POC starting 24)    Frequency/Duration: Plan Frequency: 2 times a week for 12 weeks (With potential to decrease to 1x week based on patient presentation, indep with HEP, and patient progression)      Time In/Out:   Time In: 0802  Time Out: 0859    PT Visit Info:    Progress note counter: 2  Visit Count: 10   OUTPATIENT PHYSICAL THERAPY:   Treatment Note 2024       Episode  (R shoulder pain s/p surgery)               Treatment Diagnosis:    Encounter for postoperative care  Acute pain of right shoulder  Muscle weakness (generalized)  Medical/Referring Diagnosis:    Encounter for postoperative care [Z48.89]    Referring Physician:  Edison Tenorio Jr., MD MD Orders:  PT Eval and Treat   Return MD Appt:  2024   Date of Onset:  Onset Date: 24 (status post arthroscopy right shoulder ASD, ADCR, extensive debridement SLAP tear, biceps labral complex, glenohumeral joint capsule, subacromial space, mini open rotator cuff repair and biceps tenodesis as well as excision of a lipoma right shoulder)     Allergies:   Patient has no known allergies.  Restrictions/Precautions:   None      Interventions Planned (Treatment may consist of any combination of the following):     See Assessment Note    Subjective Comments: Patient reports shoulder is \"stiff\" this morning; HEP compliance good.     Initial Pain Level::

## 2024-11-22 ENCOUNTER — APPOINTMENT (OUTPATIENT)
Dept: PHYSICAL THERAPY | Age: 69
End: 2024-11-22
Payer: MEDICARE

## 2024-11-27 ENCOUNTER — APPOINTMENT (OUTPATIENT)
Dept: PHYSICAL THERAPY | Age: 69
End: 2024-11-27
Payer: MEDICARE

## 2024-12-04 ENCOUNTER — OFFICE VISIT (OUTPATIENT)
Age: 69
End: 2024-12-04

## 2024-12-04 DIAGNOSIS — Z48.89 ENCOUNTER FOR POSTOPERATIVE CARE: Primary | ICD-10-CM

## 2024-12-04 PROCEDURE — 99024 POSTOP FOLLOW-UP VISIT: CPT | Performed by: ORTHOPAEDIC SURGERY

## 2024-12-04 NOTE — PROGRESS NOTES
Name: Tripp Rowe  YOB: 1955  Gender: male  MRN: [id number]              HPI: Tripp Rowe is a 69 y.o. right-hand-dominant male 3 months status post arthroscopy right shoulder ASD, ADCR, extensive debridement SLAP tear, biceps labral complex, glenohumeral joint capsule, subacromial space, mini open rotator cuff repair and biceps tenodesis as well as excision of a lipoma right shoulder.  Operative findings notable for 3 cm supraspinatus rotator cuff tear and a 6 x 5 cm lipoma.  He returns and is doing well      ROS/Meds/PSH/PMH/FH/SH: A ten system review of systems was performed and is negative other than what is in the HPI.   Tobacco:  reports that he quit smoking about 29 years ago. His smoking use included cigarettes. He has never used smokeless tobacco.  There were no vitals taken for this visit.     Physical Examination:  He is awake alert pleasant gentleman ambulating without difficulty  He has restricted range of cervical spine motion without radicular findings    The left shoulder has 0 to 180 degrees of active and 0 to 180 degrees passive forward elevation.   Internal rotation is to T6.  External rotation is to 60 degrees at the side.   In the 90 degree abducted position 90 degrees of external and 90 degrees internal rotation  The AC joint is non-tender  SC joint is non-tender.   Greater tuberosity is non-tender.  negative biceps  Negative O'Briens sign  negative lift-off sign  Negative belly press sign  Negative bear huggers sign  negative drop sign  negative hornblower's sign  No posterior glenohumeral joint line tenderness.   No evident excessive external rotation  Rotator cuff strength is 5/5.  negative external rotation stress test.   Negative empty can sign  There is no evident anterior or posterior apprehension with a negative sulcus sign.   No instability  negative external and internal Rotation lag sign  Neurovascularly intact.      The right shoulder incisions have healed  The

## 2024-12-06 ENCOUNTER — HOSPITAL ENCOUNTER (OUTPATIENT)
Dept: PHYSICAL THERAPY | Age: 69
Setting detail: RECURRING SERIES
Discharge: HOME | End: 2024-12-09
Payer: MEDICARE

## 2024-12-06 PROCEDURE — 97110 THERAPEUTIC EXERCISES: CPT

## 2024-12-06 NOTE — PROGRESS NOTES
top.\" Patient expresses having around a 7/10 pain when he wakes up from sleeping.    Initial Pain Level:: 0/10  Post Session Pain Level: 0/10  Medications Last Reviewed:  9/23/2024  Updated Objective Findings: Right shoulder AAROM: flex: 148, ER: WFL.  Treatment   THERAPEUTIC EXERCISE: (54 minutes):    Exercises per grid below to improve mobility, strength, balance, and coordination.  Required minimal visual, verbal, manual, and tactile cues to promote proper body alignment, promote proper body posture, promote proper body mechanics, and promote proper body breathing techniques.  Progressed resistance, range, repetitions, and complexity of movement as indicated.   Date:  12/6/24 Date:  11/11/24 Date:  11/20/24   Activity/Exercise Parameters Parameters Parameters   UBE 4 min forward 4 min reverse level 4.0 -- 3 min forward 3 min reverse level 4.0   Scapular Retraction HEP X30 seated HEP   Walkout 2X10 with TRX 2x10 TRX 2x10 TRX   Pendulum HEP HEP HEP   Isometric HEP -- HEP   External Rotation 3x10 standing with red band    Sidelying: 2x10, 3# X20 with cane    Red TB, 3x10    Sidelying 2# 3x10 3x10 standing with red band    Sidelying: 2x10, 3#   Shoulder Flexion Supine, cane, 2x10    Wall slide, 2x10 3x10 short arc    3x10 with cane    2x10 AROM supine with 3 lb weight    2x10 AAROM seated    Wall slide, 2x10 yellow ball and towel both Supine, cane, 2x10    Wall slide, 2x10   Shoulder diagonals Wall slide, D1/2 flexion, 1x10 each  Wall slide, D1/2 flexion, 1x10 each   Shoulder IR -- Red TB, 2x10 ---   Supine punch 0# 3x10  0# 3x10   Row Bent Row, 5#, 3x10 3x20 black band Bent Row, 5#, 2x10   Shoulder extension Bent shoulder extension, 3#, 2x10 3x10 black and Bent shoulder extension, 3#, 2x10   Bent Y 0# 2x10  0# 2x10   Bent T 0# 2x10  0# 2x10   Shoulder Abduction standing 0# 3x10 3x10 sidelying with 2 lb weight  S/l 2# 3x10   PROM 8 min 8 min 8 min   CloudTalk access code: [email or web address]     Time spent with patient

## 2024-12-09 ENCOUNTER — HOSPITAL ENCOUNTER (OUTPATIENT)
Dept: PHYSICAL THERAPY | Age: 69
Setting detail: RECURRING SERIES
Discharge: HOME | End: 2024-12-12
Payer: MEDICARE

## 2024-12-09 PROCEDURE — 97110 THERAPEUTIC EXERCISES: CPT

## 2024-12-09 NOTE — PROGRESS NOTES
Tripp Rowe  : 1955  Primary: [employer] (Medicare Managed)  Secondary:  FOR LIFE MEDICARE SUPP [facility] [place]  [address] [facility] [address]  Phone: [phone]  Fax: [phone] Plan Frequency: 2 times a week for 12 weeks (With potential to decrease to 1x week based on patient presentation, indep with HEP, and patient progression)  Plan of Care/Certification Expiration Date: 24 (POC starting 24)        Plan of Care/Certification Expiration Date:  Plan of Care/Certification Expiration Date: 24 (POC starting 24)    Frequency/Duration: Plan Frequency: 2 times a week for 12 weeks (With potential to decrease to 1x week based on patient presentation, indep with HEP, and patient progression)      Time In/Out:   Time In: 08   Time Out: 857    PT Visit Info:    Progress note counter: 3  Visit Count: 14   OUTPATIENT PHYSICAL THERAPY:   Treatment Note 2024       Episode  (R shoulder pain s/p surgery)               Treatment Diagnosis:    Encounter for postoperative care  Acute pain of right shoulder  Muscle weakness (generalized)  Medical/Referring Diagnosis:    Encounter for postoperative care [Z48.89]    Referring Physician:  Edison Tenorio Jr., MD MD Orders:  PT Eval and Treat   Return MD Appt:  2024   Date of Onset:  Onset Date: 24 (status post arthroscopy right shoulder ASD, ADCR, extensive debridement SLAP tear, biceps labral complex, glenohumeral joint capsule, subacromial space, mini open rotator cuff repair and biceps tenodesis as well as excision of a lipoma right shoulder)     Allergies:   Patient has no known allergies.  Restrictions/Precautions:   None      Interventions Planned (Treatment may consist of any combination of the following):     See Assessment Note    Subjective Comments: Patient reports increased soreness this AM; per feels like it is due to the weather. HEP compliance

## 2024-12-11 ENCOUNTER — HOSPITAL ENCOUNTER (OUTPATIENT)
Dept: PHYSICAL THERAPY | Age: 69
Setting detail: RECURRING SERIES
Discharge: HOME | End: 2024-12-14
Payer: MEDICARE

## 2024-12-11 PROCEDURE — 97110 THERAPEUTIC EXERCISES: CPT

## 2024-12-11 NOTE — PROGRESS NOTES
Tripp Rowe  : 1955  Primary: [employer] (Medicare Managed)  Secondary:  FOR LIFE MEDICARE SUPP [facility] [place]  [address]  [facility] [address]  Phone: [phone]  Fax: [phone] Plan Frequency: 2 times a week for 12 weeks (With potential to decrease to 1x week based on patient presentation, indep with HEP, and patient progression)  Plan of Care/Certification Expiration Date: 24 (POC starting 24)        Plan of Care/Certification Expiration Date:  Plan of Care/Certification Expiration Date: 24 (POC starting 24)    Frequency/Duration: Plan Frequency: 2 times a week for 12 weeks (With potential to decrease to 1x week based on patient presentation, indep with HEP, and patient progression)      Time In/Out:   Time In: 803   Time Out: 857    PT Visit Info:    Progress note counter: 4  Visit Count: 14   OUTPATIENT PHYSICAL THERAPY:   Treatment Note 2024       Episode  (R shoulder pain s/p surgery)               Treatment Diagnosis:    Encounter for postoperative care  Acute pain of right shoulder  Muscle weakness (generalized)  Medical/Referring Diagnosis:    Encounter for postoperative care [Z48.89]    Referring Physician:  Edison Tenorio Jr., MD MD Orders:  PT Eval and Treat   Return MD Appt:  2024   Date of Onset:  Onset Date: 24 (status post arthroscopy right shoulder ASD, ADCR, extensive debridement SLAP tear, biceps labral complex, glenohumeral joint capsule, subacromial space, mini open rotator cuff repair and biceps tenodesis as well as excision of a lipoma right shoulder)     Allergies:   Patient has no known allergies.  Restrictions/Precautions:   None      Interventions Planned (Treatment may consist of any combination of the following):     See Assessment Note    Subjective Comments: Patient states he is feeling good but the weather is causing some pain.    Initial Pain Level::

## 2024-12-16 ENCOUNTER — HOSPITAL ENCOUNTER (OUTPATIENT)
Dept: PHYSICAL THERAPY | Age: 69
Setting detail: RECURRING SERIES
Discharge: HOME | End: 2024-12-19
Payer: MEDICARE

## 2024-12-16 PROCEDURE — 97110 THERAPEUTIC EXERCISES: CPT

## 2024-12-16 NOTE — PROGRESS NOTES
is improving.    Initial Pain Level:: 2/10  Post Session Pain Level: 1/10  Medications Last Reviewed:  12/16/2024  Updated Objective Findings: Right shoulder AAROM: flex: 153, PROM: ER: 73, IR: 55.  Treatment   THERAPEUTIC EXERCISE: (54 minutes):    Exercises per grid below to improve mobility, strength, balance, and coordination.  Required minimal visual, verbal, manual, and tactile cues to promote proper body alignment, promote proper body posture, promote proper body mechanics, and promote proper body breathing techniques.  Progressed resistance, range, repetitions, and complexity of movement as indicated.   Date:  12/16/24 Date:  12/9/24 Date:  12/11/24   Activity/Exercise Parameters Parameters Parameters   UBE -- -- 4 min forward 4 min reverse level 4.0   Scapular Retraction HEP HEP HEP   Walkout 2x10 TRX Flexion and abduction to tolerance 2x10 TRX Flexion and abduction to tolerance 2x10 Flexion and abduction to tolerance TRX   External Rotation Red TB, next visit    Sidelying: 3x10, 4# Sidelying 3# 3x10 Sidelying 3# 3x10    Walkout, 2 steps, red TB 2 x 8   Shoulder Flexion Supine, cane, x20    Wall slide, 2x10 Supine, cane, 2x10    Wall slides, 2x10 Supine, cane, 2x10    Wall slide, 2x10   Shoulder diagonals Wall slide, D1/2 flexion, 2x10 each Wall slide, D1/2 flexion, 2x10 each Wall slide, D1/2 flexion, 1x10 each   Shoulder IR Green TB, next visit Next visit Walkout, 2 steps, red TB 2x8   Supine punch 5# 3x10 3# 2x10 Standing: Red TB ABCs, 1x   Row Bent Row, 10#, 3x10 Bent Row, 8#, 2x10 Bent Row, 8#, 2x10   Shoulder extension Bent shoulder extension, 6#, 3x10 Bent shoulder extension, 8#, 2x10 Bent shoulder extension, 8#, 2x10   Bent Y 1# 2x10 0# 2x10 0# 2x10   Bent T 1# 2x10 0# 2x10 0# 2x10   Shoulder Abduction -- S/l, 3# 2x10 S/l 2# 3x10   Shoulder IR stretch With strap, [id number] With strap, 7d53faw With strap, 5m53ovg   Wall push up plus 1x10     PROM 8 min 6 min --   [employer] access code: [email or web address]     Time

## 2024-12-18 ENCOUNTER — HOSPITAL ENCOUNTER (OUTPATIENT)
Dept: PHYSICAL THERAPY | Age: 69
Setting detail: RECURRING SERIES
Discharge: HOME | End: 2024-12-21
Payer: MEDICARE

## 2024-12-18 PROCEDURE — 97110 THERAPEUTIC EXERCISES: CPT

## 2024-12-18 NOTE — PROGRESS NOTES
Tripp Rowe  : 1955  Primary: [employer] (Medicare Managed)  Secondary:  FOR LIFE MEDICARE SUPP [facility] [place]  [address] [facility] [address]  Phone: [phone]  Fax: [phone] Plan Frequency: 2 times a week for 12 weeks (With potential to decrease to 1x week based on patient presentation, indep with HEP, and patient progression)  Plan of Care/Certification Expiration Date: 24 (POC starting 24)        Plan of Care/Certification Expiration Date:  Plan of Care/Certification Expiration Date: 24 (POC starting 24)    Frequency/Duration: Plan Frequency: 2 times a week for 12 weeks (With potential to decrease to 1x week based on patient presentation, indep with HEP, and patient progression)      Time In/Out:   Time In: 0800   Time Out: 855    PT Visit Info:    Progress note counter: 6  Visit Count: 14   OUTPATIENT PHYSICAL THERAPY:   Treatment Note 2024       Episode  (R shoulder pain s/p surgery)               Treatment Diagnosis:    Encounter for postoperative care  Acute pain of right shoulder  Muscle weakness (generalized)  Medical/Referring Diagnosis:    Encounter for postoperative care [Z48.89]    Referring Physician:  Edison Tenorio Jr., MD MD Orders:  PT Eval and Treat   Return MD Appt:  2024   Date of Onset:  Onset Date: 24 (status post arthroscopy right shoulder ASD, ADCR, extensive debridement SLAP tear, biceps labral complex, glenohumeral joint capsule, subacromial space, mini open rotator cuff repair and biceps tenodesis as well as excision of a lipoma right shoulder)     Allergies:   Patient has no known allergies.  Restrictions/Precautions:   None      Interventions Planned (Treatment may consist of any combination of the following):     See Assessment Note    Subjective Comments: Patient reported no pain today prior to therapy. Patient stated he feels like the warmer weather has

## 2024-12-23 ENCOUNTER — HOSPITAL ENCOUNTER (OUTPATIENT)
Dept: PHYSICAL THERAPY | Age: 69
Setting detail: RECURRING SERIES
Discharge: HOME | End: 2024-12-26
Payer: MEDICARE

## 2024-12-23 PROCEDURE — 97110 THERAPEUTIC EXERCISES: CPT

## 2024-12-23 NOTE — PROGRESS NOTES
Pain Level:: 0/10  Post Session Pain Level: 0/10  Medications Last Reviewed:  12/23/2024  Updated Objective Findings: Right shoulder AAROM: flex: 156  Treatment   THERAPEUTIC EXERCISE: (53 minutes):    Exercises per grid below to improve mobility, strength, balance, and coordination.  Required minimal visual, verbal, manual, and tactile cues to promote proper body alignment, promote proper body posture, promote proper body mechanics, and promote proper body breathing techniques.  Progressed resistance, range, repetitions, and complexity of movement as indicated.   Date:  12/16/24 Date:  12/18/24 Date:  12/23/24   Activity/Exercise Parameters Parameters Parameters   UBE -- 3 min forward 3 min reverse level 4.0 4 min forward 4 min reverse level 4.0   Scapular Retraction HEP HEP HEP   Walkout 2x10 TRX Flexion and abduction to tolerance 2x10 TRX Flexion and abduction to tolerance 2x10 Flexion and abduction to tolerance TRX   External Rotation Red TB, next visit    Sidelying: 3x10, 4# Sidelying 3# 3x10    Red TB 2 x 10 Sidelying 3# 3x10    Red TB 2 x 10   Shoulder Flexion Supine, cane, x20    Wall slide, 2x10 Wall slides, 2x10 Wall slide, 2x10   Shoulder diagonals Wall slide, D1/2 flexion, 2x10 each Wall slide, D1/2 flexion, 2x10 each Wall slide, D1/2 flexion, 2x10 each   Shoulder IR Green TB, next visit Green TB 2 x 10 Green TB 2 x 10   Supine punch 5# 3x10 Resume next visit 5# 3x10   Row Bent Row, 10#, 3x10 Bent Row, 10#, 3x10 Bent Row, 10#, 3x10   Shoulder extension Bent shoulder extension, 6#, 3x10 Bent shoulder extension, 6#, 3x10 Bent shoulder extension, 6#, 3x10   Bent Y 1# 2x10 1# 2x10 1# 3x10   Bent T 1# 2x10 1# 2x10 1# 3x10   Shoulder Abduction -- S/l, 2# 2x10 S/l 2# 3x10   Shoulder IR stretch With strap, 3e64zvy With strap, 8b72mml With strap, 2e10ovh   Wall push up plus 1x10 1x10 Counter: 2x10   PROM 8 min 6 min 6 min   [employer] access code: [email or web address]     Time spent with patient reviewing proper muscle

## 2024-12-27 ENCOUNTER — HOSPITAL ENCOUNTER (OUTPATIENT)
Dept: PHYSICAL THERAPY | Age: 69
Setting detail: RECURRING SERIES
End: 2024-12-27
Payer: MEDICARE

## 2024-12-30 ENCOUNTER — HOSPITAL ENCOUNTER (OUTPATIENT)
Dept: PHYSICAL THERAPY | Age: 69
Setting detail: RECURRING SERIES
Discharge: HOME | End: 2025-01-02
Payer: MEDICARE

## 2024-12-30 PROCEDURE — 97110 THERAPEUTIC EXERCISES: CPT

## 2024-12-30 NOTE — PROGRESS NOTES
Tripp Jae  : 1955  Primary: [employer] (Medicare Managed)  Secondary:  FOR LIFE MEDICARE SUPP [facility] [place]  [address] [facility] [address]  Phone: [phone]  Fax: [phone] Plan Frequency: 2 visit per week for 8 weeks (With plan to decrease frequency to 1 visit per week as symptoms and functional progression allow.)    Plan of Care/Certification Expiration Date: 25 (Start of POC 2024)        Plan of Care/Certification Expiration Date:  Plan of Care/Certification Expiration Date: 25 (Start of POC 2024)    Frequency/Duration: Plan Frequency: 2 visit per week for 8 weeks (With plan to decrease frequency to 1 visit per week as symptoms and functional progression allow.)      Time In/Out:   Time In: 0802  Time Out: 0858      PT Visit Info:    Progress Note Counter: 0      Visit Count:  19                OUTPATIENT PHYSICAL THERAPY:             Progress Report 2024               Episode (R shoulder pain s/p surgery)         Treatment Diagnosis:     Encounter for postoperative care  Acute pain of right shoulder  Muscle weakness (generalized)  Medical/Referring Diagnosis:    Encounter for postoperative care [Z48.89]    Referring Physician:  Edison Tenorio Jr., MD MD Orders:  PT Eval and Treat   Return MD Appt:  10/16/24  Date of Onset:  Onset Date: 24 (status post arthroscopy right shoulder ASD, ADCR, extensive debridement SLAP tear, biceps labral complex, glenohumeral joint capsule, subacromial space, mini open rotator cuff repair and biceps tenodesis as well as excision of a lipoma right shoulder)     Allergies:  Patient has no known allergies.  Restrictions/Precautions:    None      Medications Last Reviewed:  2024        OBJECTIVE     Patient denies any headaches, changes in vision, dizziness, vertigo, nausea, drop attacks, black outs, tinnitus, dysphagia, dysarthria, LE symptoms

## 2024-12-30 NOTE — PROGRESS NOTES
Tripp Rowe  : 1955  Primary: [employer] (Medicare Managed)  Secondary:  FOR LIFE MEDICARE SUPP [facility] [place]  [address]  [facility] [address]  Phone: [phone]  Fax: [phone] Plan Frequency: 2 times a week for 12 weeks (With potential to decrease to 1x week based on patient presentation, indep with HEP, and patient progression)  Plan of Care/Certification Expiration Date: 24 (POC starting 24)        Plan of Care/Certification Expiration Date:  Plan of Care/Certification Expiration Date: 24 (POC starting 24)    Frequency/Duration: Plan Frequency: 2 times a week for 12 weeks (With potential to decrease to 1x week based on patient presentation, indep with HEP, and patient progression)      Time In/Out:   Time In: 0802   Time Out: 0857    PT Visit Info:    Progress note counter: 0  Visit Count: 19   OUTPATIENT PHYSICAL THERAPY:   Treatment Note 2024       Episode  (R shoulder pain s/p surgery)               Treatment Diagnosis:    Encounter for postoperative care  Acute pain of right shoulder  Muscle weakness (generalized)  Medical/Referring Diagnosis:    Encounter for postoperative care [Z48.89]    Referring Physician:  Edison Tenorio Jr., MD MD Orders:  PT Eval and Treat   Return MD Appt:  2024   Date of Onset:  Onset Date: 24 (status post arthroscopy right shoulder ASD, ADCR, extensive debridement SLAP tear, biceps labral complex, glenohumeral joint capsule, subacromial space, mini open rotator cuff repair and biceps tenodesis as well as excision of a lipoma right shoulder)     Allergies:   Patient has no known allergies.  Restrictions/Precautions:   None      Interventions Planned (Treatment may consist of any combination of the following):     See Assessment Note    Subjective Comments: Patient reports pain levels are well controlled; strength is improving overall.    Initial Pain

## 2025-01-02 ENCOUNTER — HOSPITAL ENCOUNTER (OUTPATIENT)
Dept: PHYSICAL THERAPY | Age: 70
Setting detail: RECURRING SERIES
End: 2025-01-02
Payer: MEDICARE

## 2025-01-06 ENCOUNTER — HOSPITAL ENCOUNTER (OUTPATIENT)
Dept: PHYSICAL THERAPY | Age: 70
Setting detail: RECURRING SERIES
Discharge: HOME | End: 2025-01-09
Payer: MEDICARE

## 2025-01-06 DIAGNOSIS — M25.511 ACUTE PAIN OF RIGHT SHOULDER: ICD-10-CM

## 2025-01-06 DIAGNOSIS — Z48.89 ENCOUNTER FOR POSTOPERATIVE CARE: Primary | ICD-10-CM

## 2025-01-06 DIAGNOSIS — M62.81 MUSCLE WEAKNESS (GENERALIZED): ICD-10-CM

## 2025-01-06 PROCEDURE — 97110 THERAPEUTIC EXERCISES: CPT

## 2025-01-06 NOTE — PROGRESS NOTES
pain.    Initial Pain Level:: 0/10  Post Session Pain Level: 0/10  Medications Last Reviewed:  12/23/2024  Updated Objective Findings: See recertification note dated 12/30/2024.  Treatment   THERAPEUTIC EXERCISE: (53 minutes):    Exercises per grid below to improve mobility, strength, balance, and coordination.  Required minimal visual, verbal, manual, and tactile cues to promote proper body alignment, promote proper body posture, promote proper body mechanics, and promote proper body breathing techniques.  Progressed resistance, range, repetitions, and complexity of movement as indicated.   Date:  12/30/24 Date:  1/6/25 Date:  12/23/24   Activity/Exercise Parameters Parameters Parameters   UBE 4 min forward 4 min reverse level 4.0 4 min forward 4 min reverse level 4.0 4 min forward 4 min reverse level 4.0   Scapular Retraction HEP HEP HEP   Walkout 2x10 TRX Flexion and abduction to tolerance 2x10 TRX Flexion and abduction to tolerance 2x10 Flexion and abduction to tolerance TRX   External Rotation Green TB, 3x15    Sidelying: 3x15, 4# Sidelying 3# 3x10    Green TB 3 x 15 Sidelying 3# 3x10    Red TB 2 x 10   Shoulder Flexion Supine, cane, x20    Wall slide, 1x15 Wall slides, 2x10 Wall slide, 2x10   Shoulder diagonals Wall slide, D1/2 flexion, 2x10 each Yellow TB, 1x10 each pain free range Wall slide, D1/2 flexion, 2x10 each   Shoulder IR Green TB, 3x15 Blue TB 2 x 15 Green TB 2 x 10   Supine punch 6# 3x15 Resume next visit 5# 3x10   Row Bent Row, 10#, 3x10 Bent Row, 10#, 3x10 Bent Row, 10#, 3x10   Shoulder extension Bent shoulder extension, 6#, 3x10 Bent shoulder extension, 6#, 3x10 Bent shoulder extension, 6#, 3x10   Bent Y 1# 3x10 2# 2x10 1# 3x10   Bent T 1# 3x10 2# 2x10 1# 3x10   Shoulder Abduction -- Next visit 3# S/l 2# 3x10   Shoulder IR stretch With strap, 0m42pby With strap, 6x18ztm    Sleeper stretch in sidelying, 3x45 sec With strap, 0d73xoo   Wall push up plus Verbal review Verbal review Counter: 2x10

## 2025-01-06 NOTE — THERAPY RECERTIFICATION
Tripp Jae  : 1955  Primary: [employer] (Medicare Managed)  Secondary:  FOR LIFE MEDICARE SUPP [facility] [place]  [address] [facility] [address]  Phone: [phone]  Fax: [phone] Plan Frequency: 2 visit per week for 8 weeks (With plan to decrease frequency to 1 visit per week as symptoms and functional progression allow.)    Plan of Care/Certification Expiration Date: 25 (Start of POC 2025)        Plan of Care/Certification Expiration Date:  Plan of Care/Certification Expiration Date: 25 (Start of POC 2025)    Frequency/Duration: Plan Frequency: 2 visit per week for 8 weeks (With plan to decrease frequency to 1 visit per week as symptoms and functional progression allow.)      Time In/Out:   Time In: 0804  Time Out: 0858      PT Visit Info:    Progress Note Counter: 0      Visit Count:  20                OUTPATIENT PHYSICAL THERAPY:             Recertification 2025               Episode (R shoulder pain s/p surgery)         Treatment Diagnosis:     Encounter for postoperative care  Acute pain of right shoulder  Muscle weakness (generalized)  Medical/Referring Diagnosis:    Encounter for postoperative care [Z48.89]    Referring Physician:  Edison Tenorio Jr., MD MD Orders:  PT Eval and Treat   Return MD Appt:  10/16/24  Date of Onset:  Onset Date: 24 (status post arthroscopy right shoulder ASD, ADCR, extensive debridement SLAP tear, biceps labral complex, glenohumeral joint capsule, subacromial space, mini open rotator cuff repair and biceps tenodesis as well as excision of a lipoma right shoulder)     Allergies:  Patient has no known allergies.  Restrictions/Precautions:    None      Medications Last Reviewed:  2025        OBJECTIVE     Patient denies any headaches, changes in vision, dizziness, vertigo, nausea, drop attacks, black outs, tinnitus, dysphagia, dysarthria, LE symptoms or

## 2025-01-14 ENCOUNTER — HOSPITAL ENCOUNTER (OUTPATIENT)
Dept: PHYSICAL THERAPY | Age: 70
Setting detail: RECURRING SERIES
Discharge: HOME | End: 2025-01-17
Payer: MEDICARE

## 2025-01-14 PROCEDURE — 97110 THERAPEUTIC EXERCISES: CPT

## 2025-01-14 NOTE — PROGRESS NOTES
better\".    Initial Pain Level:: 3/10  Post Session Pain Level: 2/10  Medications Last Reviewed:  12/23/2024  Updated Objective Findings: Functional IR: Sucrum/L5 today..  Treatment   THERAPEUTIC EXERCISE: (54 minutes):    Exercises per grid below to improve mobility, strength, balance, and coordination.  Required minimal visual, verbal, manual, and tactile cues to promote proper body alignment, promote proper body posture, promote proper body mechanics, and promote proper body breathing techniques.  Progressed resistance, range, repetitions, and complexity of movement as indicated.   Date:  12/30/24 Date:  1/6/25 Date:  1/13/25   Activity/Exercise Parameters Parameters Parameters   UBE 4 min forward 4 min reverse level 4.0 4 min forward 4 min reverse level 4.0 4 min forward 4 min reverse level 4.0   Scapular Retraction HEP HEP HEP   Walkout 2x10 TRX Flexion and abduction to tolerance 2x10 TRX Flexion and abduction to tolerance --   External Rotation Green TB, 3x15    Sidelying: 3x15, 4# Sidelying 3# 3x10    Green TB 3 x 15 Sidelying 5# 2x10    Green TB 2 x 10   Shoulder Flexion Supine, cane, x20    Wall slide, 1x15 Wall slides, 2x10 Wall slide, 2x10   Shoulder diagonals Wall slide, D1/2 flexion, 2x10 each Yellow TB, 1x10 each pain free range Yellow TB, 2x10 each pain free range   Shoulder IR Green TB, 3x15 Blue TB 2 x 15 Blue TB 2 x 10   Supine punch 6# 3x15 Resume next visit 7# 2x10   Row Bent Row, 10#, 3x10 Bent Row, 10#, 3x10 Cables, standing, 20#, 2x12    Bent Row, 15#, 3x10   Shoulder extension Bent shoulder extension, 6#, 3x10 Bent shoulder extension, 6#, 3x10 --   Bent Y 1# 3x10 2# 2x10 2# 2x10   Bent T 1# 3x10 2# 2x10 2# 2x10   Shoulder Abduction -- Next visit 3# S/l 5# 2x10   Shoulder IR stretch With strap, [id number] With strap, 0o76tzy    Sleeper stretch in sidelying, 3x45 sec With strap, 1p00szx    Sleeper stretch in sidelying, 3x45 sec   Wall push up plus Verbal review Verbal review Wall 2x10, progress to

## 2025-01-21 ENCOUNTER — APPOINTMENT (OUTPATIENT)
Dept: PHYSICAL THERAPY | Age: 70
End: 2025-01-21
Payer: MEDICARE

## 2025-01-23 ENCOUNTER — TELEPHONE (OUTPATIENT)
Dept: ORTHOPEDIC SURGERY | Age: 70
End: 2025-01-23

## 2025-01-24 ENCOUNTER — HOSPITAL ENCOUNTER (OUTPATIENT)
Dept: PHYSICAL THERAPY | Age: 70
Setting detail: RECURRING SERIES
Discharge: HOME | End: 2025-01-27
Payer: MEDICARE

## 2025-01-24 PROCEDURE — 97110 THERAPEUTIC EXERCISES: CPT

## 2025-01-24 NOTE — PROGRESS NOTES
Tripp Rowe  : 1955  Primary: [employer] (Medicare Managed)  Secondary:  FOR LIFE MEDICARE SUPP [facility] [place]  [address] [facility] [address]  Phone: [phone]  Fax: [phone] Plan Frequency: 2 times a week for 12 weeks (With potential to decrease to 1x week based on patient presentation, indep with HEP, and patient progression)  Plan of Care/Certification Expiration Date: 24 (POC starting 24)        Plan of Care/Certification Expiration Date:  Plan of Care/Certification Expiration Date: 3/3/2025 (POC starting 2025)    Frequency/Duration: Plan Frequency: 2 times a week for 8 weeks (With potential to decrease to 1x week based on patient presentation, indep with HEP, and patient progression)      Time In/Out:   Time In: 0800   Time Out: 0854    PT Visit Info:    Progress note counter: 2  Visit Count: 20   OUTPATIENT PHYSICAL THERAPY:   Treatment Note 2024       Episode  (R shoulder pain s/p surgery)               Treatment Diagnosis:    Encounter for postoperative care  Acute pain of right shoulder  Muscle weakness (generalized)  Medical/Referring Diagnosis:    Encounter for postoperative care [Z48.89]    Referring Physician:  Edison Tenorio Jr., MD MD Orders:  PT Eval and Treat   Return MD Appt:  2024   Date of Onset:  Onset Date: 24 (status post arthroscopy right shoulder ASD, ADCR, extensive debridement SLAP tear, biceps labral complex, glenohumeral joint capsule, subacromial space, mini open rotator cuff repair and biceps tenodesis as well as excision of a lipoma right shoulder)     Allergies:   Patient has no known allergies.  Restrictions/Precautions:   None      Interventions Planned (Treatment may consist of any combination of the following):     See Assessment Note    Subjective Comments: Patient reports no pain prior to therapy. Patient states the only thing bothering him today is

## 2025-01-27 ENCOUNTER — HOSPITAL ENCOUNTER (OUTPATIENT)
Dept: PHYSICAL THERAPY | Age: 70
Setting detail: RECURRING SERIES
Discharge: HOME | End: 2025-01-30
Payer: MEDICARE

## 2025-01-27 PROCEDURE — 97110 THERAPEUTIC EXERCISES: CPT

## 2025-01-27 NOTE — PROGRESS NOTES
Tripp Rowe  : 1955  Primary: [employer] (Medicare Managed)  Secondary:  FOR LIFE MEDICARE SUPP [facility] [place]  [address]  [facility] [address]  Phone: [phone]  Fax: [phone] Plan Frequency: 2 times a week for 12 weeks (With potential to decrease to 1x week based on patient presentation, indep with HEP, and patient progression)  Plan of Care/Certification Expiration Date: 24 (POC starting 24)        Plan of Care/Certification Expiration Date:  Plan of Care/Certification Expiration Date: 3/3/2025 (POC starting 2025)    Frequency/Duration: Plan Frequency: 2 times a week for 8 weeks (With potential to decrease to 1x week based on patient presentation, indep with HEP, and patient progression)      Time In/Out:   Time In: 0803   Time Out: 0859    PT Visit Info:    Progress note counter: 3  Visit Count: 23   OUTPATIENT PHYSICAL THERAPY:   Treatment Note 2024       Episode  (R shoulder pain s/p surgery)               Treatment Diagnosis:    Encounter for postoperative care  Acute pain of right shoulder  Muscle weakness (generalized)  Medical/Referring Diagnosis:    Encounter for postoperative care [Z48.89]    Referring Physician:  Edison Tenorio Jr., MD MD Orders:  PT Eval and Treat   Return MD Appt:  2024   Date of Onset:  Onset Date: 24 (status post arthroscopy right shoulder ASD, ADCR, extensive debridement SLAP tear, biceps labral complex, glenohumeral joint capsule, subacromial space, mini open rotator cuff repair and biceps tenodesis as well as excision of a lipoma right shoulder)     Allergies:   Patient has no known allergies.  Restrictions/Precautions:   None      Interventions Planned (Treatment may consist of any combination of the following):     See Assessment Note    Subjective Comments: Patient reports minimal soreness at start of visit today. Work activities are improving.    Initial

## 2025-01-29 ENCOUNTER — HOSPITAL ENCOUNTER (OUTPATIENT)
Dept: PHYSICAL THERAPY | Age: 70
Setting detail: RECURRING SERIES
Discharge: HOME | End: 2025-02-01
Payer: MEDICARE

## 2025-01-29 PROCEDURE — 97110 THERAPEUTIC EXERCISES: CPT

## 2025-01-29 NOTE — PROGRESS NOTES
Tripp Rowe  : 1955  Primary: [employer] (Medicare Managed)  Secondary:  FOR LIFE MEDICARE SUPP [facility] [place]  [address] [facility] [address]  Phone: [phone]  Fax: [phone] Plan Frequency: 2 times a week for 12 weeks (With potential to decrease to 1x week based on patient presentation, indep with HEP, and patient progression)  Plan of Care/Certification Expiration Date: 24 (POC starting 24)        Plan of Care/Certification Expiration Date:  Plan of Care/Certification Expiration Date: 3/3/2025 (POC starting 2025)    Frequency/Duration: Plan Frequency: 2 times a week for 8 weeks (With potential to decrease to 1x week based on patient presentation, indep with HEP, and patient progression)      Time In/Out:   Time In: 0805   Time Out: 0900    PT Visit Info:    Progress note counter: 4  Visit Count: 24   OUTPATIENT PHYSICAL THERAPY:   Treatment Note 2024       Episode  (R shoulder pain s/p surgery)               Treatment Diagnosis:    Encounter for postoperative care  Acute pain of right shoulder  Muscle weakness (generalized)  Medical/Referring Diagnosis:    Encounter for postoperative care [Z48.89]    Referring Physician:  Edison Tenorio Jr., MD MD Orders:  PT Eval and Treat   Return MD Appt:  2024   Date of Onset:  Onset Date: 24 (status post arthroscopy right shoulder ASD, ADCR, extensive debridement SLAP tear, biceps labral complex, glenohumeral joint capsule, subacromial space, mini open rotator cuff repair and biceps tenodesis as well as excision of a lipoma right shoulder)     Allergies:   Patient has no known allergies.  Restrictions/Precautions:   None      Interventions Planned (Treatment may consist of any combination of the following):     See Assessment Note    Subjective Comments: Patient reports \"doing good\".    Initial Pain Level:: 0/10  Post Session Pain Level:

## 2025-02-03 ENCOUNTER — HOSPITAL ENCOUNTER (OUTPATIENT)
Dept: PHYSICAL THERAPY | Age: 70
Setting detail: RECURRING SERIES
Discharge: HOME | End: 2025-02-06
Payer: MEDICARE

## 2025-02-03 ENCOUNTER — OFFICE VISIT (OUTPATIENT)
Dept: ORTHOPEDIC SURGERY | Age: 70
End: 2025-02-03
Payer: MEDICARE

## 2025-02-03 DIAGNOSIS — Z47.89 ORTHOPEDIC AFTERCARE: Primary | ICD-10-CM

## 2025-02-03 DIAGNOSIS — Z48.89 ENCOUNTER FOR POSTOPERATIVE CARE: ICD-10-CM

## 2025-02-03 PROCEDURE — 1123F ACP DISCUSS/DSCN MKR DOCD: CPT | Performed by: PHYSICIAN ASSISTANT

## 2025-02-03 PROCEDURE — 99212 OFFICE O/P EST SF 10 MIN: CPT | Performed by: PHYSICIAN ASSISTANT

## 2025-02-03 PROCEDURE — G8428 CUR MEDS NOT DOCUMENT: HCPCS | Performed by: PHYSICIAN ASSISTANT

## 2025-02-03 PROCEDURE — 1036F TOBACCO NON-USER: CPT | Performed by: PHYSICIAN ASSISTANT

## 2025-02-03 PROCEDURE — 3017F COLORECTAL CA SCREEN DOC REV: CPT | Performed by: PHYSICIAN ASSISTANT

## 2025-02-03 PROCEDURE — G8417 CALC BMI ABV UP PARAM F/U: HCPCS | Performed by: PHYSICIAN ASSISTANT

## 2025-02-03 PROCEDURE — 97110 THERAPEUTIC EXERCISES: CPT

## 2025-02-03 RX ORDER — PRAZOSIN HYDROCHLORIDE 2 MG/1
CAPSULE ORAL
COMMUNITY

## 2025-02-03 RX ORDER — ASPIRIN 81 MG/1
TABLET ORAL DAILY
COMMUNITY
Start: 2024-07-15 | End: 2025-07-15

## 2025-02-03 RX ORDER — PAROXETINE 40 MG/1
TABLET, FILM COATED ORAL
COMMUNITY

## 2025-02-03 RX ORDER — LIDOCAINE 30 MG/G
1 CREAM TOPICAL 2 TIMES DAILY
COMMUNITY
Start: 2024-10-18

## 2025-02-03 RX ORDER — CETIRIZINE HYDROCHLORIDE 10 MG/1
TABLET ORAL
COMMUNITY

## 2025-02-03 RX ORDER — BENZONATATE 200 MG/1
CAPSULE ORAL
COMMUNITY
Start: 2024-11-12

## 2025-02-03 RX ORDER — DEXTROMETHORPHAN HYDROBROMIDE AND PROMETHAZINE HYDROCHLORIDE 15; 6.25 MG/5ML; MG/5ML
SYRUP ORAL
COMMUNITY

## 2025-02-03 RX ORDER — PREGABALIN 100 MG/1
CAPSULE ORAL
COMMUNITY
Start: 2024-10-18

## 2025-02-03 RX ORDER — LIDOCAINE 50 MG/G
PATCH TOPICAL
COMMUNITY

## 2025-02-03 RX ORDER — ATORVASTATIN CALCIUM 40 MG/1
TABLET, FILM COATED ORAL
COMMUNITY

## 2025-02-03 RX ORDER — SIMVASTATIN 20 MG
TABLET ORAL
COMMUNITY

## 2025-02-03 NOTE — PROGRESS NOTES
[facility]          Patient ID:  Name: Tripp Rowe  AGE/Gender: 69 y.o. male  MRN: [id number]  : 1955    Date of Consultation:  February 3, 2025          ALLERGIES: No Known Allergies     CC: Follow up right shoulder     History:  The patient presents today for recheck of right  shoulder. The patient underwent right shoulder arthroscopy with subacromial decompression, distal clavicle resection, debridement of SLAP tear, biceps labral complex, glenohumeral joint capsule debridement, subacromial space debridement, mini open rotator cuff repair and biceps tenodesis.  He has been working physical therapy.  He is making progress.  He like to continue doing this.  They have no other complaints or concerns.      Review of Systems:  Pertinent items are noted in HPI.    Past Medical History Includes:   Past Medical History:   Diagnosis Date    Bilateral knee pain     BPH (benign prostatic hypertrophy)     Cellulitis 04/10/2021    cellulitis left leg currently on antibiotic    Diabetes mellitus, type 2 (HCC)     oral meds, does not check BS, last A1C 6.0 in 2024    Elevated PSA     Gout          Hematuria, gross     Hypercholesteremia     medication    Hypertension     controlled with medication    Lung cancer (HCC)     RLL- surgery    Lung nodule     Obesity     bmi=33    JEAN (obstructive sleep apnea)     C-pap nightly    Personal history of prostate cancer     Prostate cancer (HCC) dx--10/2015    radiation only    Snoring     SOB (shortness of breath) on exertion     Inhaler as needed- last use 24   ,   Past Surgical History:   Procedure Laterality Date    BIOPSY PROSTATE      BUNIONECTOMY Bilateral     COLONOSCOPY      ESOPHAGOGASTRODUODENOSCOPY      KNEE ARTHROSCOPY Bilateral     bilateral knees    ORTHOPEDIC SURGERY Right     ankle surgery    ORTHOPEDIC SURGERY Left     left achilles tendon repair    SHOULDER ARTHROSCOPY Right 2024    1.  ARTHROSCOPY RIGHT SHOULDER ARTHROSCOPIC

## 2025-02-03 NOTE — PROGRESS NOTES
Tripp Rowe  : 1955 [facility] [address], [place] [address]  [phone]   [phone]      OUTPATIENT PHYSICAL THERAPY: PHYSICIAN COMMUNICATION    REFERRING PHYSICIAN: Edison Tenorio Jr., MD  Return Physician Appointment: 2/3/2025  MEDICAL/REFERRING DIAGNOSIS:  Encounter for postoperative care [Z48.89]  ATTENDANCE: Tripp Rowe has attended 25 sessions of therapy from 2024 to 2/3/2025.    ASSESSMENT:DATE: 2/3/2025    PROGRESS: Tripp Rowe continues to make steady progress and is progressing well with current strength training focus including progressive increases in resistance to improve right shoulder strength. Right shoulder ROM is within functional limits all planes except for functional hand behind back reach which is patient's primary complaint at this time. Subjective pain levels remain well controlled with progression of exercise program; HEP compliance is fair to good.    Please advise any specific activities or exercises you would like patient to perform or avoid.    RECOMMENDATIONS: Continue therapy 1-2 times a week through certification period/until goals are met.    Thank you for this referral, and please do not hesitate to contact me at the number listed above if you have any questions.    Wayne Gottlieb, PT, DPT

## 2025-02-03 NOTE — PROGRESS NOTES
Tripp Rowe  : 1955  Primary: [employer] (Medicare Managed)  Secondary:  FOR LIFE MEDICARE SUPP [facility] [place]  [address] [facility] [address]  Phone: [phone]  Fax: [phone] Plan Frequency: 2 times a week for 12 weeks (With potential to decrease to 1x week based on patient presentation, indep with HEP, and patient progression)  Plan of Care/Certification Expiration Date: 24 (POC starting 24)        Plan of Care/Certification Expiration Date:  Plan of Care/Certification Expiration Date: 3/3/2025 (POC starting 2025)    Frequency/Duration: Plan Frequency: 2 times a week for 8 weeks (With potential to decrease to 1x week based on patient presentation, indep with HEP, and patient progression)      Time In/Out:   Time In: 08   Time Out: 08    PT Visit Info:    Progress note counter: 5  Visit Count: 25   OUTPATIENT PHYSICAL THERAPY:   Treatment Note 2024       Episode  (R shoulder pain s/p surgery)               Treatment Diagnosis:    Encounter for postoperative care  Acute pain of right shoulder  Muscle weakness (generalized)  Medical/Referring Diagnosis:    Encounter for postoperative care [Z48.89]    Referring Physician:  Edison Tenorio Jr., MD MD Orders:  PT Eval and Treat   Return MD Appt:  2024   Date of Onset:  Onset Date: 24 (status post arthroscopy right shoulder ASD, ADCR, extensive debridement SLAP tear, biceps labral complex, glenohumeral joint capsule, subacromial space, mini open rotator cuff repair and biceps tenodesis as well as excision of a lipoma right shoulder)     Allergies:   Patient has no known allergies.  Restrictions/Precautions:   None      Interventions Planned (Treatment may consist of any combination of the following):     See Assessment Note    Subjective Comments: Patient reports no pain at start of visit today; per pt feels like his shoulder \"is getting

## 2025-02-05 ENCOUNTER — HOSPITAL ENCOUNTER (OUTPATIENT)
Dept: PHYSICAL THERAPY | Age: 70
Setting detail: RECURRING SERIES
Discharge: HOME | End: 2025-02-08
Payer: MEDICARE

## 2025-02-05 PROCEDURE — 97110 THERAPEUTIC EXERCISES: CPT

## 2025-02-05 NOTE — PROGRESS NOTES
Catheter removed intact.   Tripp Rowe  : 1955  Primary: [employer] (Medicare Managed)  Secondary:  FOR LIFE MEDICARE SUPP [facility] [place]  [address]  [facility] [address]  Phone: [phone]  Fax: [phone] Plan Frequency: 2 times a week for 12 weeks (With potential to decrease to 1x week based on patient presentation, indep with HEP, and patient progression)  Plan of Care/Certification Expiration Date: 24 (POC starting 24)        Plan of Care/Certification Expiration Date:  Plan of Care/Certification Expiration Date: 3/3/2025 (POC starting 2025)    Frequency/Duration: Plan Frequency: 2 times a week for 8 weeks (With potential to decrease to 1x week based on patient presentation, indep with HEP, and patient progression)      Time In/Out:   Time In: 08   Time Out: 859    PT Visit Info:    Progress note counter: 6  Visit Count: 25   OUTPATIENT PHYSICAL THERAPY:   Treatment Note 2024       Episode  (R shoulder pain s/p surgery)               Treatment Diagnosis:    Encounter for postoperative care  Acute pain of right shoulder  Muscle weakness (generalized)  Medical/Referring Diagnosis:    Encounter for postoperative care [Z48.89]    Referring Physician:  Edison Tenorio Jr., MD MD Orders:  PT Eval and Treat   Return MD Appt:  2024   Date of Onset:  Onset Date: 24 (status post arthroscopy right shoulder ASD, ADCR, extensive debridement SLAP tear, biceps labral complex, glenohumeral joint capsule, subacromial space, mini open rotator cuff repair and biceps tenodesis as well as excision of a lipoma right shoulder)     Allergies:   Patient has no known allergies.  Restrictions/Precautions:   None      Interventions Planned (Treatment may consist of any combination of the following):     See Assessment Note    Subjective Comments: Patient reports pain remains well controlled.    Initial Pain Level:: 0/10  Post Session Pain Level:

## 2025-02-10 ENCOUNTER — HOSPITAL ENCOUNTER (OUTPATIENT)
Dept: PHYSICAL THERAPY | Age: 70
Setting detail: RECURRING SERIES
Discharge: HOME | End: 2025-02-13
Payer: MEDICARE

## 2025-02-10 PROCEDURE — 97110 THERAPEUTIC EXERCISES: CPT

## 2025-02-10 NOTE — PROGRESS NOTES
I am accessing Mr. Rowe's chart as a part of our department's internal chart auditing process.  I certify that Mr. Rowe is, or was, a patient in our department.  Thank you,  RUSTY BERGMAN, PT  2/10/2025    
debridement SLAP tear, biceps labral complex, glenohumeral joint capsule, subacromial space, mini open rotator cuff repair and biceps tenodesis as well as excision of a lipoma right shoulder performed on 9/6/24. Patient presenting with the deficits listed below. He would benefit from skilled therapy at this time to address deficits and progress functional indep.    PROGRESS NOTE 2/10/2025: Pt has completed 27 PT treatment sessions from 9/23/2024 to 2/10/2025. Pt continues to make significant improvements in shoulder strength and AROM at this time with primary limitation of difficulty reaching behind back to complete ADLs including dressing and bathing. Pt will benefit from skilled PT treatment to address deficits in strength, AROM, and functional mobility in order to return to prior level of function and improve quality of life.      Therapy Problem List: (Impacting functional limitations):    Increased Pain, Decreased Strength, Decreased ROM, Decreased Functional Mobility, Decreased [place] with Home Exercise Program, Decreased Posture, Decreased Body Mechanics, and Decreased Activity Tolerance/Endurance*   Therapy Prognosis:   Good     Initial Assessment Complexity:   Moderate Complexity       PLAN   Effective Dates: 1/6/2025 TO Plan of Care/Certification Expiration Date: 03/03/25 (Start of POC 1/6/2025)     Frequency/Duration: Plan Frequency: 2 visit per week for 8 weeks (With plan to decrease frequency to 1 visit per week as symptoms and functional progression allow.)      Interventions Planned (Treatment may consist of any combination of the following):    Endurance Training, Home Exercise Program (HEP), Manual Therapy, Neuromuscular Re-education/Strengthening, Modalities:,   Iontophoresis,   Heat/Cold,   Mechanical Traction,   Ultrasound,   E-stim - unattended,   E-stim - manual, and   Vasopneumatic Device, Range of Motion (ROM), Therapeutic Activites, and Therapeutic Exercise/Strengthening   Goals: 
technique with exercises.     MANUAL THERAPY: (0 minutes, no charge):   Joint mobilization and Soft tissue mobilization was utilized and necessary because of the patient's restricted joint motion and painful spasm.   Soft tissue mobilization to pec and upper trap  Gh joint AP grade II/III    MODALITIES: (0 minutes):        None today      HEP: As above; handouts given to patient for all exercises.       Treatment/Session Summary:    Treatment Assessment: Continued progression of resistance to promote strength improvements; pt demonstrate good understanding of exercises with intermittent verbal cues to ensure correct exercise technique.  Communication/Consultation:   HEP reviewed  Equipment provided today:  None  Recommendations/Intent for next treatment session: Next visit will focus on participation in graded exercise program to improve activity tolerance and functional indep.    >Total Treatment Billable Duration:  54 minutes   Time In: 0802  Time Out: 0857       Wayne Gottlieb PT         Charge Capture  Events  [employer] Private Portal  Appt Desk  Attendance Report     Future Appointments   Date Time Provider Department Center   2/12/2025  8:00 AM Wayne Gottlieb, [facility]   2/18/2025  8:00 AM Ivan Singh PTA [facility]   2/25/2025  8:00 AM Wayne Gottlieb, [facility]

## 2025-02-12 ENCOUNTER — HOSPITAL ENCOUNTER (OUTPATIENT)
Dept: PHYSICAL THERAPY | Age: 70
Setting detail: RECURRING SERIES
Discharge: HOME | End: 2025-02-15
Payer: MEDICARE

## 2025-02-12 PROCEDURE — 97110 THERAPEUTIC EXERCISES: CPT

## 2025-02-12 NOTE — PROGRESS NOTES
Tripp Rowe  : 1955  Primary: [employer] (Medicare Managed)  Secondary:  FOR LIFE MEDICARE SUPP [facility] [place]  [address]  [facility] [address]  Phone: [phone]  Fax: [phone] Plan Frequency: 2 times a week for 12 weeks (With potential to decrease to 1x week based on patient presentation, indep with HEP, and patient progression)  Plan of Care/Certification Expiration Date: 24 (POC starting 24)        Plan of Care/Certification Expiration Date:  Plan of Care/Certification Expiration Date: 3/3/2025 (POC starting 2025)    Frequency/Duration: Plan Frequency: 2 times a week for 8 weeks (With potential to decrease to 1x week based on patient presentation, indep with HEP, and patient progression)      Time In/Out:   Time In: 08  Time Out: 0858    PT Visit Info:    Progress note counter: 1  Visit Count: 28   OUTPATIENT PHYSICAL THERAPY:   Treatment Note 2024       Episode  (R shoulder pain s/p surgery)               Treatment Diagnosis:    Encounter for postoperative care  Acute pain of right shoulder  Muscle weakness (generalized)  Medical/Referring Diagnosis:    Encounter for postoperative care [Z48.89]    Referring Physician:  Edison Tenorio Jr., MD MD Orders:  PT Eval and Treat   Return MD Appt:  2024   Date of Onset:  Onset Date: 24 (status post arthroscopy right shoulder ASD, ADCR, extensive debridement SLAP tear, biceps labral complex, glenohumeral joint capsule, subacromial space, mini open rotator cuff repair and biceps tenodesis as well as excision of a lipoma right shoulder)     Allergies:   Patient has no known allergies.  Restrictions/Precautions:   None      Interventions Planned (Treatment may consist of any combination of the following):     See Assessment Note    Subjective Comments: Patient reports muscle soreness following last session however today symptoms have resolved.  Initial

## 2025-02-18 ENCOUNTER — HOSPITAL ENCOUNTER (OUTPATIENT)
Dept: PHYSICAL THERAPY | Age: 70
Setting detail: RECURRING SERIES
End: 2025-02-18
Payer: MEDICARE

## 2025-02-25 ENCOUNTER — HOSPITAL ENCOUNTER (OUTPATIENT)
Dept: PHYSICAL THERAPY | Age: 70
Setting detail: RECURRING SERIES
Discharge: HOME | End: 2025-02-28
Payer: MEDICARE

## 2025-02-25 PROCEDURE — 97110 THERAPEUTIC EXERCISES: CPT

## 2025-02-25 NOTE — PROGRESS NOTES
Tripp Rowe  : 1955  Primary: [employer] (Medicare Managed)  Secondary:  FOR LIFE MEDICARE SUPP [facility] [place]  [address] [facility] [address]  Phone: [phone]  Fax: [phone] Plan Frequency: 2 times a week for 12 weeks (With potential to decrease to 1x week based on patient presentation, indep with HEP, and patient progression)  Plan of Care/Certification Expiration Date: 24 (POC starting 24)        Plan of Care/Certification Expiration Date:  Plan of Care/Certification Expiration Date: 2025 (POC starting 2025)    Frequency/Duration: Plan Frequency: 2 times a week for 8 weeks (With potential to decrease to 1x week based on patient presentation, indep with HEP, and patient progression)      Time In/Out:   Time In: 808  Time Out: 902    PT Visit Info:    Progress note counter: 0  Visit Count: 29   OUTPATIENT PHYSICAL THERAPY:   Treatment Note 2024 and hold       Episode  (R shoulder pain s/p surgery)               Treatment Diagnosis:    Encounter for postoperative care  Acute pain of right shoulder  Muscle weakness (generalized)  Medical/Referring Diagnosis:    Encounter for postoperative care [Z48.89]    Referring Physician:  Edison Tenorio Jr., MD MD Orders:  PT Eval and Treat   Return MD Appt:  None scheduled   Date of Onset:  Onset Date: 24 (status post arthroscopy right shoulder ASD, ADCR, extensive debridement SLAP tear, biceps labral complex, glenohumeral joint capsule, subacromial space, mini open rotator cuff repair and biceps tenodesis as well as excision of a lipoma right shoulder)     Allergies:   Patient has no known allergies.  Restrictions/Precautions:   None      Interventions Planned (Treatment may consist of any combination of the following):     See Assessment Note    Subjective Comments: Patient reports overall \"Feeling good\"; pt reports feels confident he can continue

## 2025-02-25 NOTE — THERAPY RECERTIFICATION
debridement SLAP tear, biceps labral complex, glenohumeral joint capsule, subacromial space, mini open rotator cuff repair and biceps tenodesis as well as excision of a lipoma right shoulder performed on 9/6/24. Patient presenting with the deficits listed below. He would benefit from skilled therapy at this time to address deficits and progress functional indep.    RECERTIFICATION NOTE 2/25/2025: Pt has completed 29 PT treatment sessions from 9/23/2024 to 2/25/2025. Pt continues to demonstrate good progression of right shoulder ROM and functional strength since last re-assessment performed on 2/10/2025. Pt demonstrate independent with HEP and has returned to independent gym exercise at this time. Pt verbalizes confidence he can continued independently with HEP at this time; PT treatment will be held and case will be closed within 1 month if pt does not require additional services within that time. Pt verbalized understanding and agreement with plan.      Therapy Problem List: (Impacting functional limitations):    Increased Pain, Decreased Strength, Decreased ROM, Decreased Functional Mobility, Decreased [place] with Home Exercise Program, Decreased Posture, Decreased Body Mechanics, and Decreased Activity Tolerance/Endurance*   Therapy Prognosis:   Good     Initial Assessment Complexity:   Moderate Complexity       PLAN   Effective Dates: 2/25/2025 TO Plan of Care/Certification Expiration Date: 04/08/25 (Start of POC 2/25/2025)     Frequency/Duration: Plan Frequency: 2 visit per week for 8 weeks (With plan to decrease frequency to 1 visit per week as symptoms and functional progression allow.)      Interventions Planned (Treatment may consist of any combination of the following):    Endurance Training, Home Exercise Program (HEP), Manual Therapy, Neuromuscular Re-education/Strengthening, Modalities:,   Iontophoresis,   Heat/Cold,   Mechanical Traction,   Ultrasound,   E-stim - unattended,   E-stim - manual,

## 2025-04-02 ENCOUNTER — TELEPHONE (OUTPATIENT)
Dept: ORTHOPEDIC SURGERY | Age: 70
End: 2025-04-02

## 2025-04-02 NOTE — TELEPHONE ENCOUNTER
Offered pt. First available in May and he said something sooner than this and I called her as  we got [facility] aut. For his arm      Thank you.

## 2025-04-04 ENCOUNTER — TELEPHONE (OUTPATIENT)
Dept: ORTHOPEDIC SURGERY | Age: 70
End: 2025-04-04

## 2025-04-04 NOTE — TELEPHONE ENCOUNTER
Received Cd for this pt. For wrist Xray, sent to load in the system    Still waiting on sooner appt. For this pt.    Thanks.

## 2025-04-07 ENCOUNTER — TELEPHONE (OUTPATIENT)
Dept: ORTHOPEDIC SURGERY | Age: 70
End: 2025-04-07

## 2025-04-07 NOTE — TELEPHONE ENCOUNTER
Pain in arm, unspecified, both hands fingers tingling, ncs done with Dr. Salmon, and was referred to neuro stude and the said nothing wrong, want second opinion as per pt. Not getting from Dr. Salmon and still having numbness and stiffness in both hands, as per pt. NCS done with Dr. Salmon in 2023     Let me know      Thanks.

## 2025-04-18 ENCOUNTER — OFFICE VISIT (OUTPATIENT)
Age: 70
End: 2025-04-18

## 2025-04-18 DIAGNOSIS — G56.21 CUBITAL TUNNEL SYNDROME ON RIGHT: ICD-10-CM

## 2025-04-18 DIAGNOSIS — G56.03 BILATERAL CARPAL TUNNEL SYNDROME: Primary | ICD-10-CM

## 2025-04-18 DIAGNOSIS — G56.22 CUBITAL TUNNEL SYNDROME ON LEFT: ICD-10-CM

## 2025-04-18 RX ORDER — BETAMETHASONE SODIUM PHOSPHATE AND BETAMETHASONE ACETATE 3; 3 MG/ML; MG/ML
6 INJECTION, SUSPENSION INTRA-ARTICULAR; INTRALESIONAL; INTRAMUSCULAR; SOFT TISSUE ONCE
Status: COMPLETED | OUTPATIENT
Start: 2025-04-18 | End: 2025-04-18

## 2025-04-18 RX ADMIN — BETAMETHASONE SODIUM PHOSPHATE AND BETAMETHASONE ACETATE 6 MG: 3; 3 INJECTION, SUSPENSION INTRA-ARTICULAR; INTRALESIONAL; INTRAMUSCULAR; SOFT TISSUE at 08:58

## 2025-04-18 NOTE — PROGRESS NOTES
Orthopedic Hand Surgery Note    Alve Jae  1955  male    History of Present Illness  The patient is a new patient to my clinic. He is here for evaluation of bilateral hand numbness and paresthesias. He has been treated for bilateral carpal and cubital tunnel syndrome at the hand center for 2 years now. Patient reports that initially the left hand started going numb in the median distribution. Over time it progressed to all the fingers. Same thing happened on the right side. He reports that he had left carpal tunnel steroid injection almost 2 years ago, but he is unsure if this provided relief or not. On physical examination, patient has equal sensation in median and ulnar distribution bilaterally. He has negative Tinel, equivocal carpal tunnel compression test, negative Tinel at the cubital tunnel. I discussed with the patient that his nerve conduction study shows mild bilateral carpal tunnel syndrome and mild bilateral cubital tunnel syndrome. His presentation and clinical history is suggestive of carpal tunnel syndrome more than cubital tunnel syndrome. I reviewed the notes from Dr. Salmon. However, his follow-up note did not document whether the injection provided relief or not. The patient also reports an incident where he landed on his right thumb against the palmar aspect of his hand, resulting in persistent pain and swelling.    Physical Exam  The patient has equal sensation in median and ulnar distribution bilaterally. He has negative Tinel, equivocal carpal tunnel compression test, negative Tinel at the cubital tunnel.    Imaging/NCS    Nerve conduction study was reviewed and independently interpreted, this demonstrates mild bilateral carpal tunnel syndrome with sensory latency of 4.0 on the right and 3.7 on the left, motor latency of 4.5 on the right and 4.1 on the left, bilateral cubital tunnel syndrome worse on the left with decrease in motor conduction velocity across the elbow segment from

## 2025-06-09 ENCOUNTER — TELEPHONE (OUTPATIENT)
Dept: ORTHOPEDIC SURGERY | Age: 70
End: 2025-06-09

## 2025-06-09 NOTE — TELEPHONE ENCOUNTER
Patient is requesting another back inject his last one was 9/18/23, I scheduled his a apt with Dr Mullen for office eval for KARL 7/29 since it's been so long but patient says his back pain is getting worse and wants to be seen sooner

## 2025-06-10 ENCOUNTER — CLINICAL DOCUMENTATION (OUTPATIENT)
Dept: ORTHOPEDIC SURGERY | Age: 70
End: 2025-06-10

## 2025-06-18 ENCOUNTER — OFFICE VISIT (OUTPATIENT)
Age: 70
End: 2025-06-18

## 2025-06-18 DIAGNOSIS — G56.22 CUBITAL TUNNEL SYNDROME ON LEFT: ICD-10-CM

## 2025-06-18 DIAGNOSIS — G56.21 CUBITAL TUNNEL SYNDROME ON RIGHT: ICD-10-CM

## 2025-06-18 DIAGNOSIS — G56.01 RIGHT CARPAL TUNNEL SYNDROME: Primary | ICD-10-CM

## 2025-06-18 DIAGNOSIS — G56.02 LEFT CARPAL TUNNEL SYNDROME: ICD-10-CM

## 2025-06-18 NOTE — PROGRESS NOTES
Orthopedic Hand Surgery Note    Alve Jae  1955  male    History of Present Illness  The patient is here for a reevaluation of bilateral carpal and cubital tunnel syndrome.    He began experiencing numbness and paresthesias in the thumb, index, and middle fingers several years ago. Initially, he was treated at the hand center with injections, which he does not think provided much relief either. Over time, the numbness progressed to include the ulnar distribution fingers, and at this point, all fingers of both hands go numb. He understands that if carpal tunnel injections do not provide good relief, it is an indicator of poor prognosis after carpal tunnel release surgery, but at this point, he has exhausted all treatment options and wants to do something about it. During the last visit, carpal tunnel injections were performed, but he does not think they provided relief.    Physical Exam  The patient has equal sensation in median and ulnar distribution bilaterally. He has negative Tinel, equivocal carpal tunnel compression test, negative Tinel at the cubital tunnel.    Imaging/NCS    Nerve conduction study was reviewed and independently interpreted, this demonstrates mild bilateral carpal tunnel syndrome with sensory latency of 4.0 on the right and 3.7 on the left, motor latency of 4.5 on the right and 4.1 on the left, bilateral cubital tunnel syndrome worse on the left with decrease in motor conduction velocity across the elbow segment from 56-37 on the left and from 53-48 on the right    Visit Diagnoses         Codes      Right carpal tunnel syndrome    -  Primary G56.01      Left carpal tunnel syndrome     G56.02      Cubital tunnel syndrome on right     G56.21      Cubital tunnel syndrome on left     G56.22          Assessment & Plan  1. Bilateral carpal and cubital tunnel syndrome.  The patient reports numbness and paresthesias in the thumb, index, and middle fingers, which has progressed to all

## 2025-06-19 ENCOUNTER — OFFICE VISIT (OUTPATIENT)
Dept: ORTHOPEDIC SURGERY | Age: 70
End: 2025-06-19
Payer: MEDICARE

## 2025-06-19 VITALS — HEIGHT: 72 IN | WEIGHT: 242 LBS | BODY MASS INDEX: 32.78 KG/M2

## 2025-06-19 DIAGNOSIS — G56.21 CUBITAL TUNNEL SYNDROME ON RIGHT: ICD-10-CM

## 2025-06-19 DIAGNOSIS — M48.062 LUMBAR STENOSIS WITH NEUROGENIC CLAUDICATION: ICD-10-CM

## 2025-06-19 DIAGNOSIS — G56.01 RIGHT CARPAL TUNNEL SYNDROME: Primary | ICD-10-CM

## 2025-06-19 DIAGNOSIS — M47.816 FACET ARTHROPATHY, LUMBAR: Primary | ICD-10-CM

## 2025-06-19 PROCEDURE — G2211 COMPLEX E/M VISIT ADD ON: HCPCS | Performed by: PHYSICIAN ASSISTANT

## 2025-06-19 PROCEDURE — 1036F TOBACCO NON-USER: CPT | Performed by: PHYSICIAN ASSISTANT

## 2025-06-19 PROCEDURE — 99214 OFFICE O/P EST MOD 30 MIN: CPT | Performed by: PHYSICIAN ASSISTANT

## 2025-06-19 PROCEDURE — G8428 CUR MEDS NOT DOCUMENT: HCPCS | Performed by: PHYSICIAN ASSISTANT

## 2025-06-19 PROCEDURE — 3017F COLORECTAL CA SCREEN DOC REV: CPT | Performed by: PHYSICIAN ASSISTANT

## 2025-06-19 PROCEDURE — 1123F ACP DISCUSS/DSCN MKR DOCD: CPT | Performed by: PHYSICIAN ASSISTANT

## 2025-06-19 PROCEDURE — G8417 CALC BMI ABV UP PARAM F/U: HCPCS | Performed by: PHYSICIAN ASSISTANT

## 2025-06-19 NOTE — PROGRESS NOTES
Name: Tripp Rowe  YOB: 1955  Gender: male  MRN: [id number]         *ANNUAL VISIT *        CC:   Chief Complaint   Patient presents with    Follow-up     Lumbar spine          HPI:         History of Present Illness  The patient is a 69-year-old male who presents for an annual visit, previously seen by Dr. Mullen, and last underwent lumbar injection therapy in 09/2023.    He reports experiencing lower back pain, which has been a recurring issue for several years. The pain is localized to the lower back, with no radiation to other areas. He does not experience any associated numbness in his legs or feet. The onset of the current episode of pain was approximately one month ago, with no identifiable precipitating injury. He is not currently on any medication for this condition. The pain is most severe upon waking in the morning and tends to alleviate slightly as the day progresses. He recalls that previous injections administered by Dr. Mullen were beneficial in managing his symptoms. He has no history of cardiac issues, diabetes, or autoimmune disorders. He is not currently on anticoagulant therapy.  Pain level at times reaches a 7 out of 10.  He is continues physician directed back exercise program since 10/31/2023.  His pain is affecting his ADLs keeping him from exercising the way he would like.                  Past Medical History Includes:   Past Medical History:   Diagnosis Date    Bilateral knee pain     BPH (benign prostatic hypertrophy)     Cellulitis 04/10/2021    cellulitis left leg currently on antibiotic    Diabetes mellitus, type 2 (HCC)     oral meds, does not check BS, last A1C 6.0 in 4/2024    Elevated PSA     Gout          Hematuria, gross     Hypercholesteremia     medication    Hypertension     controlled with medication    Lung cancer (HCC)     RLL- surgery    Lung nodule 2021    Obesity     bmi=33    JEAN (obstructive sleep apnea)     C-pap nightly    Personal history of prostate

## 2025-07-02 RX ORDER — AMLODIPINE AND BENAZEPRIL HYDROCHLORIDE 10; 20 MG/1; MG/1
1 CAPSULE ORAL DAILY
COMMUNITY

## 2025-07-02 RX ORDER — LISINOPRIL AND HYDROCHLOROTHIAZIDE 12.5; 2 MG/1; MG/1
1 TABLET ORAL DAILY
COMMUNITY
End: 2025-07-02

## 2025-07-02 RX ORDER — ROSUVASTATIN CALCIUM 20 MG/1
20 TABLET, COATED ORAL DAILY
COMMUNITY

## 2025-07-02 NOTE — PERIOP NOTE
Patient verified name and .  Order for consent NOT found in EHR at time of PAT visit. Unable to verify case posting against order; surgery verified by patient.    Type 1B-regional anesthesia surgery, phone assessment complete.  Orders not received.  Labs per surgeon: none ordered  Labs per anesthesia protocol: SQBS s/h for DOS    Patient answered medical/surgical history questions at their best of ability. All prior to admission medications documented in EPIC.    Patient instructed on the following:    > Surgery Location: [address] [place] [address] Outpatient Entrance   > Time of arrival for surgery: A nurse will call you by 5:00 pm the business day before your surgery      to tell you the time you should arrive. Your arrival time may be different than your surgery time. If there is no      answer; the nurse will leave you a voicemail. If you have not received a phone call and do not have a voicemail     by 5:00 pm the day before your surgery please call [facility] Pre-op: [phone].    >CLEAR LIQUID DIET THE DAY PRIOR TO SURGERY.   Patient may drink clear liquids up until 4 hours prior to arrival. No gum, candy, mints.   > Things included in a clear liquid diet:     Water  Black Coffee (may have sugar but no milk or cream)  Tea  Any type soft drink  Apple, Cranberry, or Grape juice  Chicken bouillon or broth  Beef bouillon or broth  Popsicles  Jell-O (any flavor), NO solid fruit mixed in  Hard candy that is clear, such as lifesavers or lemon drops    Things NOT included in clear liquid:     Milk and milk products  Milkshakes  Any solid food  Any solid soup with solid ingredients such as noodles  Any creamed soup  Gum or Mints  Orange juice (or any other juice containing pulp)        > Responsible adult must drive patient to the hospital, stay during surgery, and patient will need supervision 24 hours after anesthesia  > Use non moisturizing soap in shower the night before surgery and on the morning of

## 2025-07-03 ENCOUNTER — OFFICE VISIT (OUTPATIENT)
Dept: ORTHOPEDIC SURGERY | Age: 70
End: 2025-07-03

## 2025-07-03 DIAGNOSIS — M47.816 FACET ARTHROPATHY, LUMBAR: Primary | ICD-10-CM

## 2025-07-03 RX ORDER — TRIAMCINOLONE ACETONIDE 40 MG/ML
40 INJECTION, SUSPENSION INTRA-ARTICULAR; INTRAMUSCULAR ONCE
Status: COMPLETED | OUTPATIENT
Start: 2025-07-03 | End: 2025-07-03

## 2025-07-03 RX ADMIN — TRIAMCINOLONE ACETONIDE 40 MG: 40 INJECTION, SUSPENSION INTRA-ARTICULAR; INTRAMUSCULAR at 08:48

## 2025-07-03 NOTE — PROGRESS NOTES
Name: Tripp Rowe  YOB: 1955  Gender: male  MRN: [id number]    Procedure: Bilateral  L4-L5 facet joint injections     Precautions: Tripp Rowe deniesprior sensitivity to steroid, local anesthetic, iodine, or shellfish.     The procedure was discussed at length with the patient and informed consent was signed and placed in the chart. The patient was placed in a prone position on the fluoroscopy table and the skin was prepped and draped in a routine sterile fashion. The areas to be injected were each anesthetized with 1% lidocaine.    Next, a 25-gauge 3.5 inch spinal needle was carefully advanced under fluoroscopic guidance to the rightL4 - L5 facet joint. Aspiration was negative. Once proper placement was confirmed, 1 ml of 0.25% Marcaine and 40 mg kenalog were injected through the spinal needle.     The above procedure was then repeated through the leftL4 - L5 facet joint.      Fluoroscopic guidance was used intermittently over a 10-minute period to insure proper needle placement and his safety. A hard copy of the fluoroscopic images has been placed in his chart and is saved on the C-arm hard drive. He was monitored after the procedure and discharged home in a stable fashion with a routine follow up.     Procedural Diagnosis:     ICD-10-CM    1. Facet arthropathy, lumbar  M47.816 FL INJ LUMB/SAC FACET SINGLE LEVEL     triamcinolone acetonide (KENALOG-40) injection 40 mg           BALDEV SAMUELS MD  07/03/25

## 2025-07-11 DIAGNOSIS — G56.01 RIGHT CARPAL TUNNEL SYNDROME: Primary | ICD-10-CM

## 2025-07-11 DIAGNOSIS — G56.21 CUBITAL TUNNEL SYNDROME ON RIGHT: ICD-10-CM

## 2025-07-23 ENCOUNTER — ANESTHESIA EVENT (OUTPATIENT)
Dept: SURGERY | Age: 70
End: 2025-07-23
Payer: OTHER GOVERNMENT

## 2025-07-23 NOTE — PERIOP NOTE
Preop department called to notify patient of arrival time for scheduled procedure. Instructions given to   - Arrive at [facility] Entrance [address].  - Nothing to eat after midnight unless otherwise indicated. No gum, mints, or ice chips. You may have clear liquids two hours prior to arrival to the hospital.   - Have a responsible adult to drive patient to the hospital, stay during surgery, and patient will need supervision 24 hours after anesthesia.   - Use antibacterial soap in shower the night before surgery and on the morning of surgery.       Was patient contacted: No  Voicemail left: Yes  Numbers contacted: [phone]   Arrival time: 0800  Time to stop clear liquids: 0600

## 2025-07-24 ENCOUNTER — ANESTHESIA (OUTPATIENT)
Dept: SURGERY | Age: 70
End: 2025-07-24
Payer: OTHER GOVERNMENT

## 2025-07-24 ENCOUNTER — HOSPITAL ENCOUNTER (OUTPATIENT)
Age: 70
Setting detail: OUTPATIENT SURGERY
Discharge: HOME | End: 2025-07-24
Attending: ORTHOPAEDIC SURGERY | Admitting: ORTHOPAEDIC SURGERY
Payer: OTHER GOVERNMENT

## 2025-07-24 VITALS
WEIGHT: 240 LBS | OXYGEN SATURATION: 93 % | RESPIRATION RATE: 16 BRPM | BODY MASS INDEX: 32.51 KG/M2 | HEIGHT: 72 IN | SYSTOLIC BLOOD PRESSURE: 139 MMHG | HEART RATE: 63 BPM | DIASTOLIC BLOOD PRESSURE: 87 MMHG | TEMPERATURE: 98 F

## 2025-07-24 LAB
GLUCOSE BLD STRIP.AUTO-MCNC: 108 MG/DL (ref 65–100)
SERVICE CMNT-IMP: ABNORMAL

## 2025-07-24 PROCEDURE — 3700000000 HC ANESTHESIA ATTENDED CARE: Performed by: ORTHOPAEDIC SURGERY

## 2025-07-24 PROCEDURE — 7100000010 HC PHASE II RECOVERY - FIRST 15 MIN: Performed by: ORTHOPAEDIC SURGERY

## 2025-07-24 PROCEDURE — 6360000002 HC RX W HCPCS

## 2025-07-24 PROCEDURE — 6360000002 HC RX W HCPCS: Performed by: ORTHOPAEDIC SURGERY

## 2025-07-24 PROCEDURE — 6360000002 HC RX W HCPCS: Performed by: ANESTHESIOLOGY

## 2025-07-24 PROCEDURE — 2720000010 HC SURG SUPPLY STERILE: Performed by: ORTHOPAEDIC SURGERY

## 2025-07-24 PROCEDURE — 6360000002 HC RX W HCPCS: Performed by: NURSE PRACTITIONER

## 2025-07-24 PROCEDURE — 6370000000 HC RX 637 (ALT 250 FOR IP): Performed by: ANESTHESIOLOGY

## 2025-07-24 PROCEDURE — 3600000012 HC SURGERY LEVEL 2 ADDTL 15MIN: Performed by: ORTHOPAEDIC SURGERY

## 2025-07-24 PROCEDURE — 82962 GLUCOSE BLOOD TEST: CPT

## 2025-07-24 PROCEDURE — 7100000000 HC PACU RECOVERY - FIRST 15 MIN: Performed by: ORTHOPAEDIC SURGERY

## 2025-07-24 PROCEDURE — 3600000002 HC SURGERY LEVEL 2 BASE: Performed by: ORTHOPAEDIC SURGERY

## 2025-07-24 PROCEDURE — 64415 NJX AA&/STRD BRCH PLXS IMG: CPT | Performed by: ANESTHESIOLOGY

## 2025-07-24 PROCEDURE — 3700000001 HC ADD 15 MINUTES (ANESTHESIA): Performed by: ORTHOPAEDIC SURGERY

## 2025-07-24 PROCEDURE — 2580000003 HC RX 258: Performed by: ANESTHESIOLOGY

## 2025-07-24 PROCEDURE — 2709999900 HC NON-CHARGEABLE SUPPLY: Performed by: ORTHOPAEDIC SURGERY

## 2025-07-24 PROCEDURE — 7100000011 HC PHASE II RECOVERY - ADDTL 15 MIN: Performed by: ORTHOPAEDIC SURGERY

## 2025-07-24 PROCEDURE — C1762 CONN TISS, HUMAN(INC FASCIA): HCPCS | Performed by: ORTHOPAEDIC SURGERY

## 2025-07-24 PROCEDURE — 7100000001 HC PACU RECOVERY - ADDTL 15 MIN: Performed by: ORTHOPAEDIC SURGERY

## 2025-07-24 DEVICE — GRAFT HUM TISS W3XL6CM MINIMALLY PROC UMB CRD SFT TISS MEM: Type: IMPLANTABLE DEVICE | Site: ARM | Status: FUNCTIONAL

## 2025-07-24 RX ORDER — ACETAMINOPHEN 500 MG
1000 TABLET ORAL ONCE
Status: COMPLETED | OUTPATIENT
Start: 2025-07-24 | End: 2025-07-24

## 2025-07-24 RX ORDER — SODIUM CHLORIDE 0.9 % (FLUSH) 0.9 %
5-40 SYRINGE (ML) INJECTION PRN
Status: DISCONTINUED | OUTPATIENT
Start: 2025-07-24 | End: 2025-07-24 | Stop reason: HOSPADM

## 2025-07-24 RX ORDER — HYDROCODONE BITARTRATE AND ACETAMINOPHEN 5; 325 MG/1; MG/1
1 TABLET ORAL EVERY 6 HOURS PRN
Qty: 20 TABLET | Refills: 0 | Status: SHIPPED | OUTPATIENT
Start: 2025-07-24 | End: 2025-07-29

## 2025-07-24 RX ORDER — FENTANYL CITRATE 50 UG/ML
50 INJECTION, SOLUTION INTRAMUSCULAR; INTRAVENOUS EVERY 5 MIN PRN
Status: DISCONTINUED | OUTPATIENT
Start: 2025-07-24 | End: 2025-07-24 | Stop reason: HOSPADM

## 2025-07-24 RX ORDER — SODIUM CHLORIDE, SODIUM LACTATE, POTASSIUM CHLORIDE, CALCIUM CHLORIDE 600; 310; 30; 20 MG/100ML; MG/100ML; MG/100ML; MG/100ML
INJECTION, SOLUTION INTRAVENOUS CONTINUOUS
Status: DISCONTINUED | OUTPATIENT
Start: 2025-07-24 | End: 2025-07-24 | Stop reason: HOSPADM

## 2025-07-24 RX ORDER — FENTANYL CITRATE 50 UG/ML
100 INJECTION, SOLUTION INTRAMUSCULAR; INTRAVENOUS
Status: COMPLETED | OUTPATIENT
Start: 2025-07-24 | End: 2025-07-24

## 2025-07-24 RX ORDER — SODIUM CHLORIDE 0.9 % (FLUSH) 0.9 %
5-40 SYRINGE (ML) INJECTION EVERY 12 HOURS SCHEDULED
Status: DISCONTINUED | OUTPATIENT
Start: 2025-07-24 | End: 2025-07-24 | Stop reason: HOSPADM

## 2025-07-24 RX ORDER — BUPIVACAINE HYDROCHLORIDE 2.5 MG/ML
INJECTION, SOLUTION EPIDURAL; INFILTRATION; INTRACAUDAL; PERINEURAL PRN
Status: DISCONTINUED | OUTPATIENT
Start: 2025-07-24 | End: 2025-07-24 | Stop reason: ALTCHOICE

## 2025-07-24 RX ORDER — LIDOCAINE HYDROCHLORIDE 20 MG/ML
INJECTION, SOLUTION EPIDURAL; INFILTRATION; INTRACAUDAL; PERINEURAL
Status: DISCONTINUED | OUTPATIENT
Start: 2025-07-24 | End: 2025-07-24 | Stop reason: SDUPTHER

## 2025-07-24 RX ORDER — PROPOFOL 10 MG/ML
INJECTION, EMULSION INTRAVENOUS
Status: DISCONTINUED | OUTPATIENT
Start: 2025-07-24 | End: 2025-07-24 | Stop reason: SDUPTHER

## 2025-07-24 RX ORDER — LIDOCAINE HYDROCHLORIDE 10 MG/ML
1 INJECTION, SOLUTION INFILTRATION; PERINEURAL
Status: DISCONTINUED | OUTPATIENT
Start: 2025-07-24 | End: 2025-07-24 | Stop reason: HOSPADM

## 2025-07-24 RX ORDER — SODIUM CHLORIDE 9 MG/ML
INJECTION, SOLUTION INTRAVENOUS PRN
Status: DISCONTINUED | OUTPATIENT
Start: 2025-07-24 | End: 2025-07-24 | Stop reason: HOSPADM

## 2025-07-24 RX ORDER — IPRATROPIUM BROMIDE AND ALBUTEROL SULFATE 2.5; .5 MG/3ML; MG/3ML
1 SOLUTION RESPIRATORY (INHALATION)
Status: DISCONTINUED | OUTPATIENT
Start: 2025-07-24 | End: 2025-07-24 | Stop reason: HOSPADM

## 2025-07-24 RX ORDER — ONDANSETRON 4 MG/1
4 TABLET, FILM COATED ORAL EVERY 8 HOURS PRN
Qty: 20 TABLET | Refills: 0 | Status: SHIPPED | OUTPATIENT
Start: 2025-07-24

## 2025-07-24 RX ORDER — ONDANSETRON 2 MG/ML
4 INJECTION INTRAMUSCULAR; INTRAVENOUS
Status: DISCONTINUED | OUTPATIENT
Start: 2025-07-24 | End: 2025-07-24 | Stop reason: HOSPADM

## 2025-07-24 RX ORDER — HALOPERIDOL 5 MG/ML
1 INJECTION INTRAMUSCULAR
Status: DISCONTINUED | OUTPATIENT
Start: 2025-07-24 | End: 2025-07-24 | Stop reason: HOSPADM

## 2025-07-24 RX ORDER — OXYCODONE HYDROCHLORIDE 5 MG/1
5 TABLET ORAL
Status: DISCONTINUED | OUTPATIENT
Start: 2025-07-24 | End: 2025-07-24 | Stop reason: HOSPADM

## 2025-07-24 RX ORDER — ROPIVACAINE HYDROCHLORIDE 5 MG/ML
INJECTION, SOLUTION EPIDURAL; INFILTRATION; PERINEURAL
Status: COMPLETED | OUTPATIENT
Start: 2025-07-24 | End: 2025-07-24

## 2025-07-24 RX ORDER — LIDOCAINE HYDROCHLORIDE 10 MG/ML
INJECTION, SOLUTION INFILTRATION; PERINEURAL PRN
Status: DISCONTINUED | OUTPATIENT
Start: 2025-07-24 | End: 2025-07-24 | Stop reason: ALTCHOICE

## 2025-07-24 RX ORDER — MIDAZOLAM HYDROCHLORIDE 2 MG/2ML
2 INJECTION, SOLUTION INTRAMUSCULAR; INTRAVENOUS
Status: COMPLETED | OUTPATIENT
Start: 2025-07-24 | End: 2025-07-24

## 2025-07-24 RX ADMIN — LIDOCAINE HYDROCHLORIDE 50 MG: 20 INJECTION, SOLUTION EPIDURAL; INFILTRATION; INTRACAUDAL; PERINEURAL at 09:18

## 2025-07-24 RX ADMIN — MEPIVACAINE HYDROCHLORIDE 7 ML: 15 INJECTION, SOLUTION EPIDURAL; INFILTRATION at 08:45

## 2025-07-24 RX ADMIN — ROPIVACAINE HYDROCHLORIDE 23 ML: 5 INJECTION, SOLUTION EPIDURAL; INFILTRATION; PERINEURAL at 08:45

## 2025-07-24 RX ADMIN — MIDAZOLAM HYDROCHLORIDE 2 MG: 1 INJECTION, SOLUTION INTRAMUSCULAR; INTRAVENOUS at 08:46

## 2025-07-24 RX ADMIN — PROPOFOL 75 MCG/KG/MIN: 10 INJECTION, EMULSION INTRAVENOUS at 09:18

## 2025-07-24 RX ADMIN — SODIUM CHLORIDE, SODIUM LACTATE, POTASSIUM CHLORIDE, AND CALCIUM CHLORIDE: .6; .31; .03; .02 INJECTION, SOLUTION INTRAVENOUS at 08:49

## 2025-07-24 RX ADMIN — ACETAMINOPHEN 1000 MG: 500 TABLET, FILM COATED ORAL at 08:20

## 2025-07-24 RX ADMIN — PROPOFOL 30 MG: 10 INJECTION, EMULSION INTRAVENOUS at 09:19

## 2025-07-24 RX ADMIN — Medication 2000 MG: at 09:21

## 2025-07-24 RX ADMIN — FENTANYL CITRATE 100 MCG: 50 INJECTION INTRAMUSCULAR; INTRAVENOUS at 08:46

## 2025-07-24 ASSESSMENT — PAIN - FUNCTIONAL ASSESSMENT: PAIN_FUNCTIONAL_ASSESSMENT: 0-10

## 2025-07-24 ASSESSMENT — ENCOUNTER SYMPTOMS: SHORTNESS OF BREATH: 1

## 2025-07-24 ASSESSMENT — PAIN SCALES - GENERAL: PAINLEVEL_OUTOF10: 0

## 2025-07-24 NOTE — ANESTHESIA POSTPROCEDURE EVALUATION
Department of Anesthesiology  Postprocedure Note    Patient: Tripp Rowe  MRN: [id number]  YOB: 1955  Date of evaluation: 7/24/2025    Procedure Summary       Date: 07/24/25 Room / Location: [facility] OR 06 / [facility]    Anesthesia Start: 0910 Anesthesia Stop: 1013    Procedures:       RIGHT CUBITAL TUNNEL RELEASE/ TRANSPOSITION (Right: Arm Lower)      RIGHT ULTRASOUND GUIDED CARPAL TUNNEL RELEASE (Right: Hand) Diagnosis:       Cubital tunnel syndrome on right      Right carpal tunnel syndrome      (Cubital tunnel syndrome on right [G56.21])      (Right carpal tunnel syndrome [G56.01])    Surgeons: Josh Bean MD Responsible Provider: Christi Rowan MD    Anesthesia Type: TIVA, Regional ASA Status: 3            Anesthesia Type: TIVA, Regional    Alyson Phase I: Alyson Score: 8    Alyson Phase II: Alyson Score: 10    Anesthesia Post Evaluation    Patient location during evaluation: PACU  Patient participation: complete - patient participated  Level of consciousness: awake and alert  Airway patency: patent  Nausea: well controlled.  Cardiovascular status: acceptable.  Respiratory status: acceptable  Hydration status: stable  Pain management: adequate    No notable events documented.

## 2025-07-24 NOTE — ANESTHESIA PROCEDURE NOTES
Peripheral Block    Patient location during procedure: pre-op  Reason for block: post-op pain management and at surgeon's request  Start time: 7/24/2025 8:45 AM  End time: 7/24/2025 8:51 AM  Staffing  Performed: anesthesiologist   Anesthesiologist: Davey Sinclair MD  Performed by: Davey Sinclair MD  Authorized by: Davey Sinclair MD    Preanesthetic Checklist  Completed: patient identified, IV checked, site marked, risks and benefits discussed, surgical/procedural consents, equipment checked, pre-op evaluation, timeout performed, anesthesia consent given, oxygen available, monitors applied/VS acknowledged and blood product R/B/A discussed and consented  Peripheral Block   Patient position: sitting  Prep: ChloraPrep  Provider prep: mask and sterile gloves  Block type: Brachial plexus  Supraclavicular  Laterality: right  Injection technique: single-shot  Guidance: ultrasound guided  Local infiltration: lidocaine  Infiltration strength: 1 %  Local infiltration: lidocaine  Dose: 3 mL    Needle   Needle type: insulated echogenic nerve stimulator needle   Needle gauge: 18 G  Needle localization: ultrasound guidance  Needle length: 10 cm  Assessment   Injection assessment: negative aspiration for heme, no paresthesia on injection, local visualized surrounding nerve on ultrasound and no intravascular symptoms  Paresthesia pain: none  Slow fractionated injection: yes  Hemodynamics: stable  Outcomes: uncomplicated    Medications Administered  ropivacaine (NAROPIN) injection 0.5% - Perineural   23 mL - 7/24/2025 8:45:00 AM  mepivacaine (CARBOCAINE) injection 1.5% - Perineural   7 mL - 7/24/2025 8:45:00 AM

## 2025-07-24 NOTE — DISCHARGE INSTRUCTIONS
Postoperative  Instructions:      Weightbearing or Lifting:  Limit  weight  lifting  to  less  than  1  pound  (coffee  mug)  for  the  first  2  weeks  after  surgery.      Dressing  instructions:    Keep  your  dressing  and/or  splint  clean  and  dry  at  all  times.    You  can  remove  your  dressing  on  post-operative  day  #5  and  change  with  a  dry/sterile  dressing  or  Band-Aids  as  needed  thereafter.          Showering  Instructions:  May  shower  But keep surgical dressing clean and dry until removed as explained above. After dressing is removed, you may allow soapy water to run through the incision during showers but do not scrub. After each shower, pat dry and apply a dry dressing. Do  not  soak  your  Incision in still water or bathtub  for  3  weeks  after  surgery.    If  the  incision  gets  wet otherwise,  pat  dry  and  do  not  scrub  the  incision.  Do  not  apply  cream  or  lotion  to  incision      Pain  Control:  - You  have  been  given  a  prescription  to  be  taken  as  directed  for  post-operative  pain  control.    In  addition,  elevate  the  operative  extremity  above  the  heart  at  all  times  to  prevent  swelling  and  throbbing  pain.   - If you develop constipation while taking narcotic pain medications (Norco, Hydrocodone, Percocet, Oxycodone, Dilaudid, Hydromorphone) take  over-the-counter  Colace,  100mg  by  mouth  twice  a  Day.     - Nausea  is  a  common  side  effect  of  many  pain  medications.  You  will  want  to  eat something  before  taking  your  pain  medicine  to  help  prevent  Nausea.  - If  you  are  taking  a  prescription  pain  medication  that  contains  acetaminophen,  we  recommend  that  you  do  not  take  additional  over  the  counter  acetaminophen  (Tylenol®).      Other  pain  relieving  options:   - Using  a  cold  pack  to  ice  the  affected  area  a  few  times  a  day  (15  to  20  minutes  at  a  time)  can  help  to  relieve   and beyond to ensure you receive the best care possible.    Josh Elaine Jr, MD, PhD    MEDICATION INTERACTION:  During your procedure you potentially received a medication or medications which may reduce the effectiveness of oral contraceptives. Please consider other forms of contraception for 1 month following your procedure if you are currently using oral contraceptives as your primary form of birth control. In addition to this, we recommend continuing your oral contraceptive as prescribed, unless otherwise instructed by your physician, during this time    After general anesthesia or intravenous sedation, for 24 hours or while taking prescription Narcotics:  Limit your activities  Some people will feel drowsy or dizzy for up to a few hours after waking up.  A responsible adult needs to be with you for the next 24 hours  Do not drive and operate hazardous machinery  Do not make important personal or business decisions  Do not drink alcoholic beverages  If you have not urinated within 8 hours after discharge, and you are experiencing discomfort from urinary retention, please go to the nearest ED.  If you have sleep apnea and have a CPAP machine, please use it for all naps and sleeping.  Please use caution when taking narcotics and any of your home medications that may cause drowsiness.  *  Please give a list of your current medications to your Primary Care Provider.  *  Please update this list whenever your medications are discontinued, doses are      changed, or new medications (including over-the-counter products) are added.  *  Please carry medication information at all times in case of emergency situations.    These are general instructions for a healthy lifestyle:  No smoking/ No tobacco products/ Avoid exposure to second hand smoke  Surgeon General's Warning:  Quitting smoking now greatly reduces serious risk to your health.  Obesity, smoking, and sedentary lifestyle greatly increases your risk for

## 2025-07-24 NOTE — OP NOTE
Hand Surgery Operative Note      Alve Jae   69 y.o.   male   7/24/2025     Pre-op diagnosis: Right  Right Carpal Tunnel syndrome and Cubital Tunnel Syndrome  Post op diagnosis: same      Procedure: Right  Right Ultrasound-Guided Carpal Tunnel and open Cubital  Tunnel release, Adipofascial flap, (cpt 73622, 51923, 68685, 66863)     Surgeon: Josh Bean Jr, MD, PhD      Anesthesia: Regional block + MAC      Tourniquet time:   Total Tourniquet Time Documented:  Arm  (Right) - 33 minutes  Total: Arm  (Right) - 33 minutes        Procedure indications: Patient with radial and ulnar digit numbness recalcitrant to conservative measures with positive documentation of NCV findings consistent with carpal tunnel syndrome as well as symptoms and/or NCS findings of cubital tunnel syndrome. After Thorough discussion, the patient decided to proceed with surgical management. We discussed in detail surgical risks including scar, pain, bleeding, infection, anesthetic risks, neurovascular injury, need for further surgery,  weakness, stiffness, risk of death and potential risk of other unforseen complication.      Procedure description:      Patient was placed in the supine position and after appropriate time-out and side, site and procedure confirmed.     Using a sterile ultrasound cover and sterile gel, relevant anatomical landmarks were identified, including but not limited to the median nerve, thenar motor branch/recurrent motor branch of the median nerve, palmar cutaneous branch of the median nerve, median and ulnar digital nerves and any visualized communications, ulnar vessels and superficial palmar arch, palmar fascia and distal transverse carpal ligament. A sterile ink marker was used to kamari the borders of the transverse and longitudinal safe zones as well as the incision site in the proximal carpal tunnel region. The safe zones were re-scanned to ensure acceptable anatomy and sonographic visualization.    A regional  anesthetic was administered. A 25 gauge needle was used to create a small skin wheal at the incision site using 2 ml of buffered 1% lidocaine plain. Following this, under the guidance of ultrasound local anesthesia was delivered deep and superficial to the transverse carpal ligament using a 21-gauge 1.5 inch and 10 ml of buffered 1% lidocaine plain,  the synovial tissue from the ligament.     A #15 scalpel blade was used to create an approximately 5 mm longitudinal skin incision in the proximal carpal tunnel region. The fascia was visualized and incised. The transverse carpal ligament was visualized under ultrasound. A sterile stainless-steel dilator was then passed into the transverse safe zone to further free the synovial tissue from the ligament and facilitate device insertion, the deep aspect of the transverse carpal ligament was scraped with the instrument until a washing board sensation was elicited.     The SX-One MicroKnife was advanced into the carpal tunnel and positioned within the transverse and longitudinal safe zones and confirmed to be in the appropriate position. The transverse carpal ligament, median nerve and distal branches,  flexor tendons, ulnar artery and superficial palmar arterial arch were visualized. Following satisfactory re-assessment, the cutting blade was engaged and the transverse carpal ligament divided in a retrograde manner to release pressure on the median nerve. The cutting blade was placed in its distal recessed position and a second pass of the knife was performed after transverse and longitudinal US assessment demonstrated that all structures were again in safe position. The blade was placed in its distal recessed position and removed. The transverse carpal ligament was probed using the stainless-steel dilator to ensure complete ligament transection and release of the median nerve from the ligament and adjacent synovial or scar tissue. The micro-knife was then

## 2025-07-24 NOTE — ANESTHESIA PRE PROCEDURE
GLENOHUMERAL JOINT CAPSULE, SUBACROMIAL SPACE, CHONDROPLASTY HUMERAL HEAD AND GLENOID, MINI OPEN ROTATOR CUFF REPAIR, BICEPS TENODESIS                                       2.  EXCISION LIPOMA RIGHT SHOULDER performed by Edison Tenorio Jr., MD at [facility]   • THORACOSCOPY      RIGHT THORACOSCOPY VATS, WEDGE RESECTION WITH FROZEN SECTION, LOWER LOBECTOMY, MEDIASTINAL LYMPHADENECTOMY, CRYOABLATION OF INTERCOSTAL NERVES       Social History:    Social History     Tobacco Use   • Smoking status: Former     Current packs/day: 0.00     Types: Cigarettes     Quit date: 1995     Years since quittin.5   • Smokeless tobacco: Never   • Tobacco comments:     Stated that he smoked 1 pack a week for 6 months in the 80s   Substance Use Topics   • Alcohol use: No                                Counseling given: Not Answered  Tobacco comments: Stated that he smoked 1 pack a week for 6 months in the 80s      Vital Signs (Current):   Vitals:    25 0815 25 0850 25 0855 25 0900   BP: (!) 175/96 (!) 155/75 (!) 150/82 (!) 148/83   Pulse: 53 52 (!) 49 (!) 49   Resp: 18 16 16 16   Temp: 97.9 °F (36.6 °C)      TempSrc: Oral      SpO2: 98% 99% 99% 99%   Weight: 108.9 kg (240 lb)      Height:                                                  BP Readings from Last 3 Encounters:   25 (!) 148/83   24 129/73   23 133/86       NPO Status:                                                                                 BMI:   Wt Readings from Last 3 Encounters:   25 108.9 kg (240 lb)   25 109.8 kg (242 lb)   24 109.8 kg (242 lb)     Body mass index is 32.55 kg/m².    CBC:   Lab Results   Component Value Date/Time    WBC 11.2 2021 05:31 AM    RBC 4.52 2021 05:31 AM    HGB 12.9 2021 05:31 AM    HCT 39.7 2021 05:31 AM    MCV 87.8 2021 05:31 AM    RDW 14.6 2021 05:31 AM     2021 05:31 AM       CMP:   Lab Results   Component Value Date/Time

## 2025-07-24 NOTE — H&P
History and Physical    Subjective:     Tripp Rowe is a 69 y.o. Right US CTR and right CuTR.    Past Medical History:   Diagnosis Date    Bilateral knee pain     BPH (benign prostatic hypertrophy)     CAD (coronary artery disease)     per cardiology note [facility]    Cellulitis 04/10/2021    cellulitis left leg currently on antibiotic    CKD (chronic kidney disease) stage 3, GFR 30-59 ml/min (HCC)     Depression     Diabetes mellitus, type 2 (HCC)     oral meds, does not check BS, denies hypoglycemia    Elevated PSA     Gout          Hematuria, gross     Hypercholesteremia     medication    Hypertension     controlled with medication    Laryngopharyngeal reflux (LPR)     Lung cancer (HCC)     RLL- surgery    Lung nodule 2021    Obesity     bmi=33    JEAN (obstructive sleep apnea)     C-pap nightly    Personal history of prostate cancer     Prostate cancer (HCC) dx--10/2015    radiation only    Snoring     SOB (shortness of breath) on exertion     Inhaler as needed- last use 8/21/24      Past Surgical History:   Procedure Laterality Date    BIOPSY PROSTATE      BUNIONECTOMY Bilateral 2013    COLONOSCOPY      ESOPHAGOGASTRODUODENOSCOPY      KNEE ARTHROSCOPY Bilateral 2005    bilateral knees    ORTHOPEDIC SURGERY Right     ankle surgery    ORTHOPEDIC SURGERY Left     left achilles tendon repair    SHOULDER ARTHROSCOPY Right 9/6/2024    1.  ARTHROSCOPY RIGHT SHOULDER ARTHROSCOPIC SUBACROMIAL DECOMPRESSION, DISTAL CLAVICLE RESECTION, EXTENSIVE DEBRIDEMENT SLAP TEAR, BICEPS LABRAL COMPLEX, GLENOHUMERAL JOINT CAPSULE, SUBACROMIAL SPACE, CHONDROPLASTY HUMERAL HEAD AND GLENOID, MINI OPEN ROTATOR CUFF REPAIR, BICEPS TENODESIS                                       2.  EXCISION LIPOMA RIGHT SHOULDER performed by Edison Tenorio Jr., MD at [facility]    THORACOSCOPY  2021    RIGHT THORACOSCOPY VATS, WEDGE RESECTION WITH FROZEN SECTION, LOWER LOBECTOMY, MEDIASTINAL LYMPHADENECTOMY, CRYOABLATION OF INTERCOSTAL NERVES     Social History

## 2025-07-25 DIAGNOSIS — G56.01 RIGHT CARPAL TUNNEL SYNDROME: Primary | ICD-10-CM

## 2025-07-25 DIAGNOSIS — G56.21 CUBITAL TUNNEL SYNDROME ON RIGHT: ICD-10-CM

## 2025-07-29 ENCOUNTER — EVALUATION (OUTPATIENT)
Age: 70
End: 2025-07-29
Payer: OTHER GOVERNMENT

## 2025-07-29 DIAGNOSIS — G56.01 RIGHT CARPAL TUNNEL SYNDROME: ICD-10-CM

## 2025-07-29 DIAGNOSIS — M25.621 STIFFNESS OF RIGHT ELBOW JOINT: ICD-10-CM

## 2025-07-29 DIAGNOSIS — G56.21 CUBITAL TUNNEL SYNDROME ON RIGHT: Primary | ICD-10-CM

## 2025-07-29 DIAGNOSIS — M25.631 STIFFNESS OF RIGHT WRIST JOINT: ICD-10-CM

## 2025-07-29 PROCEDURE — 97110 THERAPEUTIC EXERCISES: CPT | Performed by: OCCUPATIONAL THERAPIST

## 2025-07-29 PROCEDURE — 97166 OT EVAL MOD COMPLEX 45 MIN: CPT | Performed by: OCCUPATIONAL THERAPIST

## 2025-07-29 PROCEDURE — 97010 HOT OR COLD PACKS THERAPY: CPT | Performed by: OCCUPATIONAL THERAPIST

## 2025-07-29 NOTE — PROGRESS NOTES
[facility]THOP[facility]ED[facility]  [address] [place] [id number]  Dept: [phone]   Occupational Therapy Initial Assessment     PERTINENT MEDICAL HISTORY   Referring MD: Josh Bean MD  Diagnosis:     ICD-10-CM    1. Cubital tunnel syndrome on right  G56.21       2. Right carpal tunnel syndrome  G56.01       3. Stiffness of right elbow joint  M25.621       4. Stiffness of right wrist joint  M25.631          Surgery or Medical Dx: Right Ultrasound-Guided Carpal Tunnel and open Cubital  Tunnel release, Adipofascial flap,     Date of SX  7/24/25  [] Op Note reviewed/  Special instructions: Start nerve gliding 3-5 days post op  Co-morbidities affecting plan of care / Therapy precautions: None     Preferred Name: Alve  SUBJECTIVE   History of injury (how symptoms started) : >1 year R hand numbness and tingling; L hand numbness and tingling started Dec 2023  Chief Complaint/Current Symptoms: Pain, weakness, difficulty with tasks including any resisted hand use and reaching, and difficulty performing job duties  Nature of condition: Chronic (continuous duration > 3 months)  Primary cause of current episode: Post-surgical  Number of Occupational Therapy Visits in past 90 days: 0    Pain Assessment:  Description: aching  Average Pain/symptom intensity (0-10 scale)  Last 24 hours: 0/10  Last week (1-7 days): 3/10  How often do you feel symptoms? Occasionally (26-50%)  Aggravating factors: reaching, lifting, carrying, upper body dressing/grooming, and lower body dressing/grooming  Alleviating factors: rest and avoid aggravating movements  How much have your symptoms interfered with daily activities? Quite a bit    Neuro screen: Pt c/o, numbness, and tingling: R RF, SF; L hand    Social/Functional Hx:  In general, would you say your current overall health is very good  Pt lives with wife   Current DME: none  Work Status: Employed full time: appliance repair- personal business   Sleep: moderately disturbed

## 2025-08-01 ENCOUNTER — OFFICE VISIT (OUTPATIENT)
Age: 70
End: 2025-08-01

## 2025-08-01 ENCOUNTER — TREATMENT (OUTPATIENT)
Age: 70
End: 2025-08-01
Payer: OTHER GOVERNMENT

## 2025-08-01 DIAGNOSIS — G56.01 RIGHT CARPAL TUNNEL SYNDROME: Primary | ICD-10-CM

## 2025-08-01 DIAGNOSIS — M25.521 RIGHT ELBOW PAIN: ICD-10-CM

## 2025-08-01 DIAGNOSIS — G56.21 CUBITAL TUNNEL SYNDROME ON RIGHT: ICD-10-CM

## 2025-08-01 DIAGNOSIS — M25.631 STIFFNESS OF RIGHT WRIST JOINT: ICD-10-CM

## 2025-08-01 DIAGNOSIS — M25.621 STIFFNESS OF RIGHT ELBOW JOINT: Primary | ICD-10-CM

## 2025-08-01 PROCEDURE — 97110 THERAPEUTIC EXERCISES: CPT | Performed by: OCCUPATIONAL THERAPIST

## 2025-08-01 PROCEDURE — 97010 HOT OR COLD PACKS THERAPY: CPT | Performed by: OCCUPATIONAL THERAPIST

## 2025-08-01 PROCEDURE — 99024 POSTOP FOLLOW-UP VISIT: CPT | Performed by: NURSE PRACTITIONER

## 2025-08-01 PROCEDURE — 97140 MANUAL THERAPY 1/> REGIONS: CPT | Performed by: OCCUPATIONAL THERAPIST

## 2025-08-01 RX ORDER — HYDROCODONE BITARTRATE AND ACETAMINOPHEN 5; 325 MG/1; MG/1
1 TABLET ORAL EVERY 6 HOURS PRN
Qty: 20 TABLET | Refills: 0 | Status: SHIPPED | OUTPATIENT
Start: 2025-08-01 | End: 2025-08-06

## 2025-08-01 NOTE — PROGRESS NOTES
Orthopaedic Hand Surgery Note    Name: Tripp Rowe  YOB: 1955  Gender: male  MRN: [id number]    HPI: Patient is status post Right Cubital Tunnel Release/ Transposition - Right and Right Ultrasound Guided Carpal Tunnel Release - Right on 7/24/2025. Patient reports sensation is improved.  He reports the thumb, index, middle are much better.  He said he still has intermittent numbness and tingling in the ring and small finger.  Night symptoms have resolved. No fevers or chills.     Physical Examination:  Wound healing well.  Steri-Strips are intact to the right elbow.  There is expected postoperative ecchymosis and swelling.  Good finger and wrist and elbow range of motion. Sensation is improved from preoperative.    Assessment:   1. Right carpal tunnel syndrome    2. Cubital tunnel syndrome on right        Status post Right Cubital Tunnel Release/ Transposition - Right and Right Ultrasound Guided Carpal Tunnel Release - Right on 7/24/2025    Plan:  Patient has a therapy appointment today.  We discussed lifting, pushing, pulling restrictions for the next couple of weeks.  We will reassess his progress back in 6 weeks.  I will refill his pain medication.      Radha Granger NP  Orthopaedic Surgery  08/01/25  10:38 AM

## 2025-08-01 NOTE — PROGRESS NOTES
[facility]THOPAED[facility]  [address] [place] [id number]  Dept: [phone]   Occupational Therapy Daily Note     PERTINENT MEDICAL HISTORY   Referring MD: Josh Bean MD  Diagnosis:     ICD-10-CM    1. Stiffness of right elbow joint  M25.621       2. Stiffness of right wrist joint  M25.631       3. Right elbow pain  M25.521            Surgery or Medical Dx: Right Ultrasound-Guided Carpal Tunnel and open Cubital  Tunnel release, Adipofascial flap,     Date of SX  7/24/25  [] Op Note reviewed/  Special instructions: Start nerve gliding 3-5 days post op  Co-morbidities affecting plan of care / Therapy precautions: None     Preferred Name: Alve  SUBJECTIVE     States he over did it and had some increased soreness yesterday.  Compression is helpful with soreness and swelling.  OBJECTIVE   Functional Outcome Measures: Quick Dash  43 score=   72.72 % functional deficit at IE  ma restricting end range elbow and wrist, hand ROM, bicep tightness   UE ROM:  Elbow  AROM (deg) RIGHT  7/29/25 LEFT   Comments   Extension/ Flexion 44/76       Forearm/Wrist AROM (degrees)  RIGHT  7/30/25 LEFT       Pronation/Supination 70/89      Wrist Extension/Flexion 60/60      RD/UD         Payor: Payor: GIOVANNI LEE /  /  /  Billing pattern: Government- total time    Authorized Visits:  Episode visit count:  2     CPT /   Modifier needed: No  REFERRAL UNITS Treatment Time (1:1)  Total Time    (in office)    [address] Manual therapy to improve joint and/or soft tissue mobility, ROM, and function as well as helping to decrease pain/spasms and swelling. Time includes palpation for assessment of soft tissue structures as well as treatment.  1 10    Therapeutic exercise (25063) to develop ROM, strength, endurance and flexibility 1 15    Hot Pack (03170) applied to R UE to increase tissue extensibility, relax tight muscles, and/or decrease pain. Skin checked prior to application and post treatment with no visible skin issues

## 2025-08-13 ENCOUNTER — TREATMENT (OUTPATIENT)
Age: 70
End: 2025-08-13
Payer: OTHER GOVERNMENT

## 2025-08-13 DIAGNOSIS — Z78.9 DECREASED ACTIVITIES OF DAILY LIVING (ADL): ICD-10-CM

## 2025-08-13 DIAGNOSIS — M25.631 STIFFNESS OF RIGHT WRIST JOINT: ICD-10-CM

## 2025-08-13 DIAGNOSIS — M62.81 MUSCLE WEAKNESS (GENERALIZED): ICD-10-CM

## 2025-08-13 DIAGNOSIS — M25.521 RIGHT ELBOW PAIN: ICD-10-CM

## 2025-08-13 DIAGNOSIS — M25.621 STIFFNESS OF RIGHT ELBOW JOINT: Primary | ICD-10-CM

## 2025-08-13 PROCEDURE — 97010 HOT OR COLD PACKS THERAPY: CPT | Performed by: OCCUPATIONAL THERAPIST

## 2025-08-13 PROCEDURE — 97140 MANUAL THERAPY 1/> REGIONS: CPT | Performed by: OCCUPATIONAL THERAPIST

## 2025-08-13 PROCEDURE — 97110 THERAPEUTIC EXERCISES: CPT | Performed by: OCCUPATIONAL THERAPIST

## 2025-08-18 ENCOUNTER — TREATMENT (OUTPATIENT)
Age: 70
End: 2025-08-18
Payer: OTHER GOVERNMENT

## 2025-08-18 DIAGNOSIS — M25.621 STIFFNESS OF RIGHT ELBOW JOINT: Primary | ICD-10-CM

## 2025-08-18 DIAGNOSIS — M25.521 RIGHT ELBOW PAIN: ICD-10-CM

## 2025-08-18 DIAGNOSIS — Z78.9 DECREASED ACTIVITIES OF DAILY LIVING (ADL): ICD-10-CM

## 2025-08-18 DIAGNOSIS — M62.81 MUSCLE WEAKNESS (GENERALIZED): ICD-10-CM

## 2025-08-18 DIAGNOSIS — M25.631 STIFFNESS OF RIGHT WRIST JOINT: ICD-10-CM

## 2025-08-18 PROCEDURE — 97010 HOT OR COLD PACKS THERAPY: CPT | Performed by: OCCUPATIONAL THERAPIST

## 2025-08-18 PROCEDURE — 97140 MANUAL THERAPY 1/> REGIONS: CPT | Performed by: OCCUPATIONAL THERAPIST

## 2025-08-18 PROCEDURE — 97110 THERAPEUTIC EXERCISES: CPT | Performed by: OCCUPATIONAL THERAPIST

## 2025-08-25 ENCOUNTER — TREATMENT (OUTPATIENT)
Age: 70
End: 2025-08-25
Payer: OTHER GOVERNMENT

## 2025-08-25 DIAGNOSIS — M25.621 STIFFNESS OF RIGHT ELBOW JOINT: Primary | ICD-10-CM

## 2025-08-25 DIAGNOSIS — M25.631 STIFFNESS OF RIGHT WRIST JOINT: ICD-10-CM

## 2025-08-25 DIAGNOSIS — M62.81 MUSCLE WEAKNESS (GENERALIZED): ICD-10-CM

## 2025-08-25 DIAGNOSIS — M25.521 RIGHT ELBOW PAIN: ICD-10-CM

## 2025-08-25 DIAGNOSIS — Z78.9 DECREASED ACTIVITIES OF DAILY LIVING (ADL): ICD-10-CM

## 2025-08-25 PROCEDURE — 97140 MANUAL THERAPY 1/> REGIONS: CPT | Performed by: OCCUPATIONAL THERAPIST

## 2025-08-25 PROCEDURE — 97110 THERAPEUTIC EXERCISES: CPT | Performed by: OCCUPATIONAL THERAPIST

## 2025-08-25 PROCEDURE — 97010 HOT OR COLD PACKS THERAPY: CPT | Performed by: OCCUPATIONAL THERAPIST

## 2025-08-29 ENCOUNTER — TREATMENT (OUTPATIENT)
Age: 70
End: 2025-08-29

## 2025-08-29 DIAGNOSIS — M25.631 STIFFNESS OF RIGHT WRIST JOINT: ICD-10-CM

## 2025-08-29 DIAGNOSIS — M25.621 STIFFNESS OF RIGHT ELBOW JOINT: Primary | ICD-10-CM

## 2025-08-29 DIAGNOSIS — M62.81 MUSCLE WEAKNESS (GENERALIZED): ICD-10-CM

## 2025-08-29 DIAGNOSIS — Z78.9 DECREASED ACTIVITIES OF DAILY LIVING (ADL): ICD-10-CM

## 2025-08-29 DIAGNOSIS — M25.521 RIGHT ELBOW PAIN: ICD-10-CM

## 2025-08-29 PROCEDURE — 97010 HOT OR COLD PACKS THERAPY: CPT | Performed by: OCCUPATIONAL THERAPIST

## 2025-09-03 ENCOUNTER — TREATMENT (OUTPATIENT)
Age: 70
End: 2025-09-03

## 2025-09-03 DIAGNOSIS — M25.521 RIGHT ELBOW PAIN: ICD-10-CM

## 2025-09-03 DIAGNOSIS — M25.631 STIFFNESS OF RIGHT WRIST JOINT: ICD-10-CM

## 2025-09-03 DIAGNOSIS — Z78.9 DECREASED ACTIVITIES OF DAILY LIVING (ADL): ICD-10-CM

## 2025-09-03 DIAGNOSIS — M62.81 MUSCLE WEAKNESS (GENERALIZED): ICD-10-CM

## 2025-09-03 DIAGNOSIS — M25.621 STIFFNESS OF RIGHT ELBOW JOINT: Primary | ICD-10-CM

## (undated) DEVICE — GAUZE,SPONGE,4"X4",16PLY,STRL,LF,10/TRAY: Brand: MEDLINE

## (undated) DEVICE — BANDAGE,GAUZE,BULKEE II,4.5"X4.1YD,STRL: Brand: MEDLINE

## (undated) DEVICE — 2, DISPOSABLE SUCTION/IRRIGATOR WITHOUT DISPOSABLE TIP: Brand: STRYKEFLOW

## (undated) DEVICE — SUTURE VCRL SZ 2 L54IN ABSRB UD L65MM TP-1 1/2 CIR J880T

## (undated) DEVICE — TUBING INSUFFLATION SMK EVAC HI FLO SET PNEUMOCLEAR

## (undated) DEVICE — STERILE HOOK LOCK LATEX FREE ELASTIC BANDAGE 6INX5YD: Brand: HOOK LOCK™

## (undated) DEVICE — Device

## (undated) DEVICE — PADDING CAST W4INXL4YD ST COT COHESIVE HND TEARABLE SPEC

## (undated) DEVICE — SUTURE PERMAHAND SZ 0 L30IN NONABSORBABLE BLK L30MM PSL 3/8 590H

## (undated) DEVICE — SOLUTION IRRIG 1000ML 09% SOD CHL USP PIC PLAS CONTAINER

## (undated) DEVICE — BUTTON SWITCH PENCIL BLADE ELECTRODE, HOLSTER: Brand: EDGE

## (undated) DEVICE — STAPLER INT L34CM 60MM LNG ENDOSCP ARTC PWR + ECHELON FLX

## (undated) DEVICE — SURGICAL PROCEDURE PACK BASIC ST FRANCIS

## (undated) DEVICE — BLADE OPHTH DIA4MM MINI MENIS FLAT ARTHRO LOK

## (undated) DEVICE — COTTON UNDERCAST PADDING,REGULAR FINISH: Brand: WEBRIL

## (undated) DEVICE — SUTURE PDS II SZ 0 L27IN ABSRB VLT L26MM CT-2 1/2 CIR Z334H

## (undated) DEVICE — BNDG,ELSTC,MATRIX,STRL,2"X5YD,LF,HOOK&LP: Brand: MEDLINE

## (undated) DEVICE — STAPLER SKIN L320MM 35MM VASC TISS 12 FIRING B FRM PWR

## (undated) DEVICE — ADHESIVE SKIN CLOSURE TOP 36 CC HI VISC DERMBND MINI

## (undated) DEVICE — ZIMMER® STERILE DISPOSABLE TOURNIQUET CUFF WITH PLC, DUAL PORT, SINGLE BLADDER, 18 IN. (46 CM)

## (undated) DEVICE — SUTURE MCRYL SZ 3-0 L27IN ABSRB UD L24MM PS-1 3/8 CIR PRIM Y936H

## (undated) DEVICE — SUTURE VICRYL SZ 0 L27IN ABSRB UD L36MM CT-1 1/2 CIR J260H

## (undated) DEVICE — TROCAR: Brand: KII® SLEEVE

## (undated) DEVICE — BANDAGE COMPR L5YDXW2IN FOAM CO FLX

## (undated) DEVICE — BNDG,ELSTC,MATRIX,STRL,6"X5YD,LF,HOOK&LP: Brand: MEDLINE

## (undated) DEVICE — LAPAROSCOPIC TROCAR SLEEVE/SINGLE USE: Brand: KII® OPTICAL ACCESS SYSTEM

## (undated) DEVICE — TRAY CATH 16F URIN MTR LTX -- CONVERT TO ITEM 363111

## (undated) DEVICE — 3M™ IOBAN™ 2 ANTIMICROBIAL INCISE DRAPE 6650EZ: Brand: IOBAN™ 2

## (undated) DEVICE — 3M™ COBAN™ SELF-ADHERENT WRAP, 1586S, STERILE, 6 IN X 5 YD (15 CM X 4,5 M), 12 ROLLS/CASE: Brand: 3M™ COBAN™

## (undated) DEVICE — BLADE SCALPEL NO 15 STERILE

## (undated) DEVICE — AMD ANTIMICROBIAL BANDAGE ROLL,6 PLY: Brand: KERLIX

## (undated) DEVICE — BLADE SHV CUT MENIS AGG + 4MM --

## (undated) DEVICE — GLOVE SURG SZ 6.5 L11.2IN THK8.6MIL LT BRN LTX FREE

## (undated) DEVICE — UNIVERSAL DRAPES: Brand: MEDLINE INDUSTRIES, INC.

## (undated) DEVICE — INTENDED TO BE USED TO OCCLUDE, RETRACT AND IDENTIFY ARTERIES, VEINS, TENDONS AND NERVES IN SURGICAL PROCEDURES: Brand: STERION®  VESSEL LOOP

## (undated) DEVICE — LARGE TEAR CROSS CUT RASP (14.0 X 7.0MM)

## (undated) DEVICE — 2000CC GUARDIAN II: Brand: GUARDIAN

## (undated) DEVICE — SPONGE LAP 18X18IN STRL -- 5/PK

## (undated) DEVICE — YANKAUER,BULB TIP,W/O VENT,RIGID,STERILE: Brand: MEDLINE

## (undated) DEVICE — VISUALIZATION SYSTEM: Brand: CLEARIFY

## (undated) DEVICE — SX-ONE MICROKNIFE IS REBRANDED AS ULTRAGUIDECTR.  ULTRAGUIDECTR IS INTENDED TO TRANSECT THE TRANSVERSE CARPAL LIGAMENT FOR THE TREATMENT OF CARPAL TUNNEL SYNDROME.ULTRAGUIDECTR IS A DISPOSABLE DEVICE INTENDED TO CREATE SPACE WITHIN THE CARPAL TUNNEL AND TRANSECT THE TRANSVERSE CARPAL LIGAMENT (TCL) FOR THE TREATMENT OF CARPAL TUNNEL SYNDROME.: Brand: SX-ONE MICROKNIFE

## (undated) DEVICE — SUTURE VCRL SZ 0 L36IN ABSRB UD L36MM CT-1 1/2 CIR J946H

## (undated) DEVICE — MASTISOL ADHESIVE LIQ 2/3ML

## (undated) DEVICE — PROTECTOR CUSHION ULNAR NERVE --

## (undated) DEVICE — BIT DRL TWST STR SHNK 2MM SS -- DISP 6/BX

## (undated) DEVICE — SOLUTION IRRIG 1000ML H2O PIC PLAS SHATTERPROOF CONTAINER

## (undated) DEVICE — TUBING, SUCTION, 1/4" X 10', STRAIGHT: Brand: MEDLINE

## (undated) DEVICE — VINYL URETHRAL CATHETER: Brand: DOVER

## (undated) DEVICE — STRIP,CLOSURE,WOUND,MEDI-STRIP,1/2X4: Brand: MEDLINE

## (undated) DEVICE — LOGICUT SCISSOR LENGTH 320MM: Brand: LOGI - LAPAROSCOPIC INSTRUMENT SYSTEM

## (undated) DEVICE — STANDARD HYPODERMIC NEEDLE,POLYPROPYLENE HUB: Brand: MONOJECT

## (undated) DEVICE — SET IRRIG W 96IN TBNG 4 LN FLX BG

## (undated) DEVICE — REM POLYHESIVE ADULT PATIENT RETURN ELECTRODE: Brand: VALLEYLAB

## (undated) DEVICE — CURITY ABDOMINAL PAD: Brand: CURITY

## (undated) DEVICE — BASIC SINGLE BASIN 2-LF: Brand: MEDLINE INDUSTRIES, INC.

## (undated) DEVICE — SUTURE MONOCRYL STRATAFIX SPRL + SZ 4-0 L12IN ABSRB UD PS-2 SXMP1B117

## (undated) DEVICE — DISPOSABLE TOURNIQUET CUFF SINGLE BLADDER, DUAL PORT AND QUICK CONNECT CONNECTOR: Brand: COLOR CUFF

## (undated) DEVICE — SOLUTION IV 1000ML 0.9% SOD CHL

## (undated) DEVICE — SLING ARM AD ULT

## (undated) DEVICE — DISPOSABLE BIPOLAR CODE, 12' (3.66 M): Brand: CONMED

## (undated) DEVICE — THORACOSCOPY: Brand: MEDLINE INDUSTRIES, INC.

## (undated) DEVICE — BNDG,ELSTC,MATRIX,STRL,4"X5YD,LF,HOOK&LP: Brand: MEDLINE

## (undated) DEVICE — SUTURE VCRL SZ 0 L27IN ABSRB VLT L26MM CT-2 1/2 CIR J334H

## (undated) DEVICE — Z CONVERTED USE 2421973 CONTAINER SPEC 60/30ML 10% FRMLN POLYPR PREFIL

## (undated) DEVICE — INTENDED FOR TISSUE SEPARATION, AND OTHER PROCEDURES THAT REQUIRE A SHARP SURGICAL BLADE TO PUNCTURE OR CUT.: Brand: BARD-PARKER ® STAINLESS STEEL BLADES

## (undated) DEVICE — PREP SKN CHLRAPRP APL 26ML STR --

## (undated) DEVICE — SET IRRIG DST FLX M CONN

## (undated) DEVICE — GLOVE ORANGE PI 7 1/2   MSG9075

## (undated) DEVICE — SOLUTION IRRIG 3000ML 0.9% SOD CHL FLX CONT 0797208] ICU MEDICAL INC]

## (undated) DEVICE — CURITY NON-ADHERENT STRIPS: Brand: CURITY

## (undated) DEVICE — CRADLE POS 3X5X24IN RASPBERRY ARM PRONE FOAM DISP

## (undated) DEVICE — SPONGE GZ W4XL4IN COT 12 PLY TYP VII WVN C FLD DSGN

## (undated) DEVICE — AMD ANTIMICROBIAL GAUZE SPONGES,12 PLY USP TYPE VII, 0.2% POLYHEXAMETHYLENE BIGUANIDE HCI (PHMB): Brand: CURITY

## (undated) DEVICE — SYR 50ML LR LCK 1ML GRAD NSAF --

## (undated) DEVICE — STANDARD SURGICAL GOWN, L: Brand: CONVERTORS

## (undated) DEVICE — ABDOMINAL PAD: Brand: DERMACEA

## (undated) DEVICE — DRAPE, EXTREMITY, BILATERAL, STERILE: Brand: MEDLINE

## (undated) DEVICE — (D)PREP SKN CHLRAPRP APPL 26ML -- CONVERT TO ITEM 371833

## (undated) DEVICE — TROCAR: Brand: KII FIOS FIRST ENTRY

## (undated) DEVICE — RELOAD STPL L60MM H1.5-3.6MM REG TISS BLU GRIPPING SURF B

## (undated) DEVICE — CATHETER THOR 32FR L23IN PVC 6 EYELET STR ATRAUM

## (undated) DEVICE — SUTURE VICRYL + SZ 4-0 L27IN ABSRB UD PS-2 3/8 CIR REV CUT VCP426H

## (undated) DEVICE — STOCKINETTE,IMPERVIOUS,12X48,STERILE: Brand: MEDLINE

## (undated) DEVICE — HAND PACK: Brand: MEDLINE INDUSTRIES, INC.

## (undated) DEVICE — SOLUTION IRRIG 3000ML H2O STRL BAG

## (undated) DEVICE — (D)STRIP SKN CLSR 0.5X4IN WHT --

## (undated) DEVICE — RELOAD STPL H1-2.5XL35MM VASC THN TISS WHT B FRM NAT ARTC

## (undated) DEVICE — SUT ETHLN 3-0 18IN PS1 BLK --

## (undated) DEVICE — DRAPE TWL SURG 16X26IN BLU ORB04] ALLCARE INC]

## (undated) DEVICE — STERILE HOOK LOCK LATEX FREE ELASTIC BANDAGE 4INX5YD: Brand: HOOK LOCK™

## (undated) DEVICE — PROBE CRYO CRYOSPHERE 11IN -- ARTRICURE

## (undated) DEVICE — SUTURE ETHIBOND EXCEL SZ 3-0 L30IN NONABSORBABLE GRN L26MM SH X832H

## (undated) DEVICE — STRETCH BANDAGE ROLL: Brand: DERMACEA

## (undated) DEVICE — CATHETER THOR 32FR L23IN PVC 5 EYELET STR ATRAUM

## (undated) DEVICE — GARMENT,MEDLINE,DVT,INT,CALF,MED, GEN2: Brand: MEDLINE

## (undated) DEVICE — LAPSAC SURGICAL TISSUE POUCH: Brand: LAPSAC

## (undated) DEVICE — RELOAD STPL L60MM H2.3-4.2MM VERY THCK TISS BLK 6 ROW

## (undated) DEVICE — SINGLE PORT MANIFOLD